# Patient Record
Sex: MALE | Race: WHITE | NOT HISPANIC OR LATINO | Employment: FULL TIME | URBAN - METROPOLITAN AREA
[De-identification: names, ages, dates, MRNs, and addresses within clinical notes are randomized per-mention and may not be internally consistent; named-entity substitution may affect disease eponyms.]

---

## 2017-01-18 ENCOUNTER — APPOINTMENT (OUTPATIENT)
Dept: CARDIOLOGY | Facility: CLINIC | Age: 47
End: 2017-01-18
Attending: HOSPITALIST
Payer: COMMERCIAL

## 2017-01-18 ENCOUNTER — APPOINTMENT (OUTPATIENT)
Dept: GENERAL RADIOLOGY | Facility: CLINIC | Age: 47
End: 2017-01-18
Attending: EMERGENCY MEDICINE
Payer: COMMERCIAL

## 2017-01-18 ENCOUNTER — HOSPITAL ENCOUNTER (OUTPATIENT)
Facility: CLINIC | Age: 47
Setting detail: OBSERVATION
Discharge: HOME | End: 2017-01-18
Attending: EMERGENCY MEDICINE | Admitting: HOSPITALIST
Payer: COMMERCIAL

## 2017-01-18 VITALS
DIASTOLIC BLOOD PRESSURE: 86 MMHG | BODY MASS INDEX: 29.17 KG/M2 | WEIGHT: 192.46 LBS | TEMPERATURE: 98.1 F | HEART RATE: 59 BPM | HEIGHT: 68 IN | SYSTOLIC BLOOD PRESSURE: 138 MMHG | OXYGEN SATURATION: 95 % | RESPIRATION RATE: 18 BRPM

## 2017-01-18 DIAGNOSIS — R07.9 CHEST PAIN: ICD-10-CM

## 2017-01-18 PROBLEM — R07.89 ATYPICAL CHEST PAIN: Status: ACTIVE | Noted: 2017-01-18

## 2017-01-18 LAB
ALBUMIN SERPL-MCNC: 3.7 G/DL (ref 3.4–5)
ALP SERPL-CCNC: 74 U/L (ref 40–150)
ALT SERPL W P-5'-P-CCNC: 30 U/L (ref 0–70)
ANION GAP SERPL CALCULATED.3IONS-SCNC: 9 MMOL/L (ref 3–14)
AST SERPL W P-5'-P-CCNC: 17 U/L (ref 0–45)
BASOPHILS # BLD AUTO: 0 10E9/L (ref 0–0.2)
BASOPHILS NFR BLD AUTO: 0.3 %
BILIRUB SERPL-MCNC: 0.5 MG/DL (ref 0.2–1.3)
BUN SERPL-MCNC: 14 MG/DL (ref 7–30)
CALCIUM SERPL-MCNC: 8.8 MG/DL (ref 8.5–10.1)
CHLORIDE SERPL-SCNC: 106 MMOL/L (ref 94–109)
CO2 SERPL-SCNC: 24 MMOL/L (ref 20–32)
CREAT SERPL-MCNC: 0.8 MG/DL (ref 0.66–1.25)
D DIMER PPP FEU-MCNC: NORMAL UG/ML FEU (ref 0–0.5)
DIFFERENTIAL METHOD BLD: NORMAL
EOSINOPHIL # BLD AUTO: 0.3 10E9/L (ref 0–0.7)
EOSINOPHIL NFR BLD AUTO: 2.4 %
ERYTHROCYTE [DISTWIDTH] IN BLOOD BY AUTOMATED COUNT: 13.4 % (ref 10–15)
GFR SERPL CREATININE-BSD FRML MDRD: ABNORMAL ML/MIN/1.7M2
GLUCOSE SERPL-MCNC: 103 MG/DL (ref 70–99)
HCT VFR BLD AUTO: 43.2 % (ref 40–53)
HGB BLD-MCNC: 15 G/DL (ref 13.3–17.7)
IMM GRANULOCYTES # BLD: 0 10E9/L (ref 0–0.4)
IMM GRANULOCYTES NFR BLD: 0.4 %
LYMPHOCYTES # BLD AUTO: 2.9 10E9/L (ref 0.8–5.3)
LYMPHOCYTES NFR BLD AUTO: 27.1 %
MCH RBC QN AUTO: 31.4 PG (ref 26.5–33)
MCHC RBC AUTO-ENTMCNC: 34.7 G/DL (ref 31.5–36.5)
MCV RBC AUTO: 91 FL (ref 78–100)
MONOCYTES # BLD AUTO: 0.7 10E9/L (ref 0–1.3)
MONOCYTES NFR BLD AUTO: 6.7 %
NEUTROPHILS # BLD AUTO: 6.9 10E9/L (ref 1.6–8.3)
NEUTROPHILS NFR BLD AUTO: 63.1 %
NRBC # BLD AUTO: 0 10*3/UL
NRBC BLD AUTO-RTO: 0 /100
PLATELET # BLD AUTO: 243 10E9/L (ref 150–450)
POTASSIUM SERPL-SCNC: 3.4 MMOL/L (ref 3.4–5.3)
PROT SERPL-MCNC: 7 G/DL (ref 6.8–8.8)
RBC # BLD AUTO: 4.77 10E12/L (ref 4.4–5.9)
SODIUM SERPL-SCNC: 139 MMOL/L (ref 133–144)
TROPONIN I SERPL-MCNC: NORMAL UG/L (ref 0–0.04)
WBC # BLD AUTO: 10.9 10E9/L (ref 4–11)

## 2017-01-18 PROCEDURE — 93350 STRESS TTE ONLY: CPT | Mod: 26 | Performed by: INTERNAL MEDICINE

## 2017-01-18 PROCEDURE — 85379 FIBRIN DEGRADATION QUANT: CPT | Performed by: INTERNAL MEDICINE

## 2017-01-18 PROCEDURE — 93016 CV STRESS TEST SUPVJ ONLY: CPT | Performed by: INTERNAL MEDICINE

## 2017-01-18 PROCEDURE — 93350 STRESS TTE ONLY: CPT | Mod: TC

## 2017-01-18 PROCEDURE — 93325 DOPPLER ECHO COLOR FLOW MAPG: CPT | Mod: 26 | Performed by: INTERNAL MEDICINE

## 2017-01-18 PROCEDURE — G0378 HOSPITAL OBSERVATION PER HR: HCPCS

## 2017-01-18 PROCEDURE — 93018 CV STRESS TEST I&R ONLY: CPT | Performed by: INTERNAL MEDICINE

## 2017-01-18 PROCEDURE — 93321 DOPPLER ECHO F-UP/LMTD STD: CPT | Mod: 26 | Performed by: INTERNAL MEDICINE

## 2017-01-18 PROCEDURE — 85025 COMPLETE CBC W/AUTO DIFF WBC: CPT | Performed by: EMERGENCY MEDICINE

## 2017-01-18 PROCEDURE — 71020 XR CHEST 2 VW: CPT

## 2017-01-18 PROCEDURE — 99236 HOSP IP/OBS SAME DATE HI 85: CPT | Performed by: HOSPITALIST

## 2017-01-18 PROCEDURE — 84484 ASSAY OF TROPONIN QUANT: CPT | Mod: 91 | Performed by: HOSPITALIST

## 2017-01-18 PROCEDURE — 36415 COLL VENOUS BLD VENIPUNCTURE: CPT | Performed by: INTERNAL MEDICINE

## 2017-01-18 PROCEDURE — 36415 COLL VENOUS BLD VENIPUNCTURE: CPT | Performed by: HOSPITALIST

## 2017-01-18 PROCEDURE — 84484 ASSAY OF TROPONIN QUANT: CPT | Performed by: EMERGENCY MEDICINE

## 2017-01-18 PROCEDURE — 93005 ELECTROCARDIOGRAM TRACING: CPT

## 2017-01-18 PROCEDURE — 80053 COMPREHEN METABOLIC PANEL: CPT | Performed by: EMERGENCY MEDICINE

## 2017-01-18 PROCEDURE — 25000132 ZZH RX MED GY IP 250 OP 250 PS 637: Performed by: EMERGENCY MEDICINE

## 2017-01-18 PROCEDURE — 99285 EMERGENCY DEPT VISIT HI MDM: CPT | Mod: 25

## 2017-01-18 RX ORDER — ACETAMINOPHEN 325 MG/1
650 TABLET ORAL EVERY 4 HOURS PRN
Status: DISCONTINUED | OUTPATIENT
Start: 2017-01-18 | End: 2017-01-18 | Stop reason: HOSPADM

## 2017-01-18 RX ORDER — NALOXONE HYDROCHLORIDE 0.4 MG/ML
.1-.4 INJECTION, SOLUTION INTRAMUSCULAR; INTRAVENOUS; SUBCUTANEOUS
Status: DISCONTINUED | OUTPATIENT
Start: 2017-01-18 | End: 2017-01-18 | Stop reason: HOSPADM

## 2017-01-18 RX ORDER — HYDRALAZINE HYDROCHLORIDE 20 MG/ML
10 INJECTION INTRAMUSCULAR; INTRAVENOUS EVERY 4 HOURS PRN
Status: DISCONTINUED | OUTPATIENT
Start: 2017-01-18 | End: 2017-01-18 | Stop reason: HOSPADM

## 2017-01-18 RX ORDER — ASPIRIN 81 MG/1
324 TABLET, CHEWABLE ORAL ONCE
Status: COMPLETED | OUTPATIENT
Start: 2017-01-18 | End: 2017-01-18

## 2017-01-18 RX ORDER — ASPIRIN 81 MG/1
81 TABLET ORAL DAILY
Status: DISCONTINUED | OUTPATIENT
Start: 2017-01-18 | End: 2017-01-18

## 2017-01-18 RX ORDER — ACETAMINOPHEN 650 MG/1
650 SUPPOSITORY RECTAL EVERY 4 HOURS PRN
Status: DISCONTINUED | OUTPATIENT
Start: 2017-01-18 | End: 2017-01-18 | Stop reason: HOSPADM

## 2017-01-18 RX ORDER — ALUMINA, MAGNESIA, AND SIMETHICONE 2400; 2400; 240 MG/30ML; MG/30ML; MG/30ML
15-30 SUSPENSION ORAL EVERY 4 HOURS PRN
Status: DISCONTINUED | OUTPATIENT
Start: 2017-01-18 | End: 2017-01-18 | Stop reason: HOSPADM

## 2017-01-18 RX ORDER — ASPIRIN 81 MG/1
162 TABLET, CHEWABLE ORAL ONCE
Status: DISCONTINUED | OUTPATIENT
Start: 2017-01-18 | End: 2017-01-18 | Stop reason: HOSPADM

## 2017-01-18 RX ORDER — LIDOCAINE 40 MG/G
CREAM TOPICAL
Status: DISCONTINUED | OUTPATIENT
Start: 2017-01-18 | End: 2017-01-18 | Stop reason: HOSPADM

## 2017-01-18 RX ORDER — ASPIRIN 81 MG/1
81 TABLET ORAL DAILY
Status: DISCONTINUED | OUTPATIENT
Start: 2017-01-19 | End: 2017-01-18 | Stop reason: HOSPADM

## 2017-01-18 RX ORDER — ATORVASTATIN CALCIUM 20 MG/1
20 TABLET, FILM COATED ORAL DAILY
Status: DISCONTINUED | OUTPATIENT
Start: 2017-01-18 | End: 2017-01-18 | Stop reason: HOSPADM

## 2017-01-18 RX ORDER — NITROGLYCERIN 0.4 MG/1
0.4 TABLET SUBLINGUAL EVERY 5 MIN PRN
Status: DISCONTINUED | OUTPATIENT
Start: 2017-01-18 | End: 2017-01-18

## 2017-01-18 RX ORDER — METOPROLOL SUCCINATE 50 MG/1
50 TABLET, EXTENDED RELEASE ORAL DAILY
Status: DISCONTINUED | OUTPATIENT
Start: 2017-01-18 | End: 2017-01-18 | Stop reason: HOSPADM

## 2017-01-18 RX ORDER — NITROGLYCERIN 0.4 MG/1
0.4 TABLET SUBLINGUAL EVERY 5 MIN PRN
Status: DISCONTINUED | OUTPATIENT
Start: 2017-01-18 | End: 2017-01-18 | Stop reason: HOSPADM

## 2017-01-18 RX ORDER — CLOPIDOGREL BISULFATE 75 MG/1
75 TABLET ORAL DAILY
Status: DISCONTINUED | OUTPATIENT
Start: 2017-01-18 | End: 2017-01-18 | Stop reason: HOSPADM

## 2017-01-18 RX ADMIN — ASPIRIN 81 MG 324 MG: 81 TABLET ORAL at 00:52

## 2017-01-18 ASSESSMENT — ENCOUNTER SYMPTOMS
SHORTNESS OF BREATH: 1
FEVER: 0
DIARRHEA: 0
NAUSEA: 0
ABDOMINAL PAIN: 0
PALPITATIONS: 1
COUGH: 0
VOMITING: 0

## 2017-01-18 NOTE — H&P
PRIMARY CARE PHYSICIAN:  Dr. Oscar Husain.      CHIEF COMPLAINT:  Chest pain.      HISTORY OF PRESENT ILLNESS:  Mr. Dustin Avila is a 46-year-old male with past medical history significant for hypertension, dyslipidemia, CAD with history of stents, history of Joshua-Parkinson-White syndrome, who presented to the ER today with complaints of chest pain.  He has a history of CAD, had drug-eluting stents to OM1, LAD and ramus in 06/2016.  He was supposed to have a stress echocardiogram after that, but he never got that.  He states he has been undergoing a lot of stress recently, has been going through a bad divorce.  He states for past 1 week he has been having chest pain intermittently helped by nitroglycerin.  He has also been having difficulty breathing at the same time.  He states for the past 2 days the sensation feels more like pressure now and no specific aggravating or alleviating factor.  He denies dyspnea on exertion.  In the ER he was seen by Dr. Singh.  He was given aspirin and he is currently chest pain-free.  He denies any fever, chills or rigors.  He denies pain in abdomen.  Reports a normal bowel and bladder habit.      REVIEW OF SYSTEMS:  A 10-point review of systems was done and was negative apart from those mentioned in the history of present illness.      PAST MEDICAL HISTORY:   1.  Hypertension.   2.  Dyslipidemia.   3.  Coronary artery disease, status post drug-eluting stent to OM1, LAD and ramus in 06/2016.   4.  History of Joshua-Parkinson-White syndrome.      MEDICATIONS PRIOR TO ADMISSION:  This  needs to be verified by the pharmacy, but he seems to be on Lipitor, Toprol-XL, aspirin, nitroglycerin, Plavix, magnesium, Senna, nutritional supplements, pyridoxine multivitamin.      SOCIAL HISTORY:  He reports smoking half pack per day.  Takes alcohol very rarely.  Denies illicit drug use.      FAMILY HISTORY:  He denies any family history of CAD.      ALLERGIES:  No known drug allergies.       PHYSICAL EXAMINATION:   GENERAL:  The patient is conscious, alert, oriented x3, lying comfortably in bed in no apparent distress.   VITAL SIGNS:  Temperature 97.4, heart rate of 74, blood pressure 137/85, saturation 95% on room air.   HEENT:  Pupils are equal and reactive to light and accommodation.  Extraocular movements are intact.  Oral mucosa is moist.   NECK:  Supple, no raised JVD.   LUNGS:  Lung sounds bilaterally clear to auscultation, no wheezes or crepitation.   CARDIOVASCULAR:  Normal S1-S2, regular rate and rhythm, no murmur.   ABDOMEN:  Soft, nontender, nondistended, no guarding, rigidity or rebound tenderness.   LOWER EXTREMITIES:  With no edema.   NEUROLOGIC:  No focal neurological deficits noted.  Cranial nerves II-XII grossly intact.   PSYCHIATRIC:  Normal mood and affect.      LABORATORY DATA AND IMAGING:  CBC, BMP reviewed in Epic are mostly unremarkable.  Troponin I negative x1.  Chest x-ray reviewed by me shows no acute infiltrates, effusion or pneumothorax.  EKG reviewed by me shows normal sinus rhythm, no acute ST-T wave changes.      ASSESSMENT AND PLAN:  Mr. Dustin Avila is a 46-year-old male with past medical history significant for hypertension, dyslipidemia, coronary artery disease, who presented to the emergency room today with complaints of chest pain.     1.  Chest pressure:  Sounds atypical, has been ongoing for 1 week without specific aggravating or relieving factor.  However, this is in the setting of significant coronary artery disease 6 months ago, so will admit him for observation.  We will monitor him on telemetry.  We will follow serial troponins and continue with aspirin.  Will resume his prior to admission Plavix, Lipitor, Toprol-XL when verified by pharmacy.  We will obtain a stress echocardiogram in a.m.     2.  Coronary artery disease with history of drug-eluting stent to OM1, LAD and ramus in 06/2016:  Will resume his prior to admission Lipitor, Toprol-XL, Plavix when  verified by pharmacy.  Plan as noted above.     3.  Hypertension:  Resume Toprol-XL when verified.  Will have hydralazine p.r.n.   4.  Dyslipidemia:  Resume Lipitor when verified.   5.  Deep venous thrombosis prophylaxis:  Anticipate a short stay, encourage ambulation.   6.  Code status:  Full code.         MT LOGAN MD             D: 2017 02:34   T: 2017 03:10   MT: [email or web address]      Name:     TAMMI WEBSTER   MRN:      [id number]        Account:      [id number]   :      1970           Admitted:     [id number]      Document: [id number]       cc: Oscar Husain MD

## 2017-01-18 NOTE — ED NOTES
Pt states he is feeling better, no chest discomfort like before.  Note sinus rhythm in 60's.  Pt did have a 5 beat run of v-tach at 0040

## 2017-01-18 NOTE — ED PROVIDER NOTES
"  History     Chief Complaint:  Chest Pain      HPI   Dustin Avila is a 46 year old male smoker who presents with chest pain.  Per chart review, he has a history significant for tobacco abuse, hypercholesterolemia and a non-ST segment elevation myocardial infarction in 06/2016.  At that time, he was taken to the Cardiac Catheterization Lab where Dr. Wiley placed drug-eluting stents in his left anterior descending artery, ramus intermedius branch and first obtuse marginal.  This left him with an occluded posterolateral branch that filled via left-to-right collaterals.  Ejection fraction in the hospital was 50%-55%.  Of note, he also has Joshua-Parkinson-White, first documented at [facility] in 2011, this appears to be asymptomatic.   The patient reports for the last week he has noticed increasing shortness of breath which he describes as \"labored breathing when I'm talking.\"  He states neither exertion nor positional changes have seemed to affect his shortness of breath.  The patient states over the past two days he has noticed intermittent chest pain that he describes as a tingling and pressure like sensation.  He states today he took a Nitro which improved his pain but tonight and chest pain which he describes as \"rolling pain.\"  He states his pain has been waxing and waning since onset this evening and lasts about 10 seconds when it comes on.  He reports he took Nitro at 11 PM which did not seem to improve his pain and decided to present to the ED.  He states the pain feels slightly similar to the pain he had when he required stent placement but also reports at times the pain feels somewhat similar to past heartburn, though he reports he has not had heartburn for a long time.   He states he has never been placed on a Holter monitor.  He denies recent fever, cough, nausea, vomiting, diarrhea, or abdominal pain.  He notes he his currently very stressed and anxious due to an ongoing divorce.  He denies " "recent travel.     Cardiac Risk Factors:  CAD:    +  Hypertension:   -  Hyperlipidemia:  +  Diabetes:   -  Tobacco use:   +  Gender:   M  Age:    46  Familial Hx of CAD:  -    Allergies:  NKDA      Medications:    Lipitor  Toprol-XL  Magnesium  Aspirin  Nitrostat  Plavix  Pyridoxine     Past Medical History:    CAD  NSTEMI  HLD  WPW  Arthritis  Achilles Tendonitis  Osgood-Schlatter/Osteochondroses  Lung Nodule  Ankle Edema  Generalized Anxiety     Past Surgical History:    Left achilles repair  Right hip replacement  Herniorrhaphy  Septoplasty  Knee Arthroscopy  Coronary Stent     Family History:  Father: Diabetes    Social History:  Relationship status:   The patient is a current every day smoker. 0.50 pack/day for 35 years  The patient denies alcohol use.   The patient presents alone.     Review of Systems   Constitutional: Negative for fever.   Respiratory: Positive for shortness of breath. Negative for cough.    Cardiovascular: Positive for chest pain and palpitations.   Gastrointestinal: Negative for nausea, vomiting, abdominal pain and diarrhea.   All other systems reviewed and are negative.      Physical Exam   First Vitals:  BP: 148/80 mmHg  Pulse: 74  Heart Rate: 68  Temp: 97.4  F (36.3  C)  Resp: 18  Height: 172.7 cm (5' 8\")  Weight: 91.1 kg (200 lb 13.4 oz)  SpO2: 98 %      Physical Exam    Physical Exam   Constitutional:  Patient is oriented to person, place, and time. They appear well-developed and well-nourished. Mild distress secondary to chest pain.    HENT:   Mouth/Throat:   Oropharynx is clear and moist.   Eyes:    Conjunctivae normal and EOM are normal. Pupils are equal, round, and reactive to light.   Neck:    Normal range of motion.   Cardiovascular: Normal rate, regular rhythm and normal heart sounds.  Exam reveals no gallop and no friction rub.  No murmur heard.  Pulmonary/Chest:  Effort normal and breath sounds normal. Patient has no wheezes. Patient has no rales.  No pleuritic chest " pain.   Abdominal:   Soft. Bowel sounds are normal. Patient exhibits no mass. There is no tenderness. There is no rebound and no guarding.   Musculoskeletal:  Normal range of motion. Patient exhibits no edema.   Neurological:   Patient is alert and oriented to person, place, and time. Patient has normal strength. No cranial nerve deficit or sensory deficit. GCS 15  Skin:   Skin is warm and dry. No rash noted. No erythema.   Psychiatric:   Patient has a normal mood and affect. Patient's behavior is normal. Judgment and thought content normal.     Emergency Department Course     ECG @ 0007  Indication: Chest Pain  Rate 70 bpm.   WV interval 126 ms.   QRS duration 92 ms.   QT/QTc 394/425 ms.   P-R-T axes 51.  Notes: Normal sinus rhythm.   Time read 0012    Imaging:  Radiographic findings were communicated with the patient who voiced understanding of the findings.    Chest XR per radiology:   IMPRESSION: No acute abnormality.    Laboratory:  CBC: WBC 10.9 (WNL) HGB 15.0 (WNL)  (WNL)   CMP: Cr 0.80 (WNL) Glucose 103 (H) Rest WNL  0033: Troponin I: <0.015 (WNL)     Interventions:  0052 Aspirin, 324 mg, PO    ED Course:  Nursing notes and past medical history reviewed.   I performed a physical examination of the patient as documented above.  I explained the plan with the patient who consents to this.   The patient underwent the workup as described above.      0143 Recheck and update.  The patient reports his pain is minimal and fleeting.  We discussed observation admission which he consents to.    Discussed the patient with Dr. Baires from the Hospitalist Service.    I personally reviewed the laboratory and imaging results with the Patient and answered all related questions prior to observation admission.     Findings and plan explained to the patient who consents to observation. Discussed the patient with Dr. Baires, who will place the patient in observation in the observation area.      Impression & Plan       Medical Decision Making:  Dustin Avila is a 46 year old male who presents to the emergency department today with a significant coronary artery history as recently as 6 months who presents with chest pain that has been off and on for the last two days.  Some incidence of the pain feels similar to when he received his stents, some do not.  Nitro earlier in the day was helping but he took a Nitro prior to arrival that did not help.  He also has felt short of breath that he describes as labored breathing when talking.  He has not had any fever or cough.  He has low risk factors for PE.  His pain was not concerning for dissection.  ECG shows no elevation of diffuse elevation that would be concerning for pericarditis.  In light of no fever and a normal troponin I believe myocarditis is unlikely.  Chest XR shows no evidence of pneumonia, CHF, pneumothorax, rib fractures, abnormal mediastinum, or other acute abnormality.  He is not acutely anemic.  There is no metabolic derangement.  He is not significantly hypertensive.  At the time that I saw him he is pain free and has remained so for the most part.  His episodes of pain now are just very brief, lasting seconds in duration.  At this time I do feel he needs to be admitted for further evaluation of his chest pain issues based on his significant history.  I will admit to Dr. Baires.     Diagnosis:    ICD-10-CM   1. Chest pain R07.9       Disposition:   The patient will be brought into the hospital for observation under the care of Dr. Baires.      Jeff SHEFFIELD, am serving as a scribe on 1/18/2017 at 12:36 AM to personally document services performed by iMya Singh MD, based on my observations and the provider's statements to me.          Miya Singh MD  01/18/17 0204

## 2017-01-18 NOTE — ED NOTES
"[facility]  ED Nurse Handoff Report    ED Chief complaint: Chest Pain      ED Diagnosis:   Final diagnoses:   Chest pain       Code Status: Full Code    Allergies: No Known Allergies    Activity level:  Independent     Needed?: No    Isolation: No  Infection: Not Applicable    Bariatric?: No      Vital Signs:   Filed Vitals:    01/18/17 0005 01/18/17 0031   BP: 148/80 130/90   Pulse: 74    Temp: 97.4  F (36.3  C)    TempSrc: Oral    Resp: 18 14   Height: 1.727 m (5' 8\")    Weight: 91.1 kg (200 lb 13.4 oz)    SpO2: 98% 96%       Cardiac Rhythm: ,        Pain level: 0-10 Pain Scale: 1    Is this patient confused?: No    Patient Report: Initial Complaint: chest pain  Focused Assessment: alert, orientated, monitor show sinus rhythm  Tests Performed: labs, chest  xray  Abnormal Results: see results  Treatments provided: monitor, asa    Family Comments: no    OBS brochure/video discussed/provided to patient: N/A    ED Medications:   Medications   aspirin chewable tablet 324 mg (324 mg Oral Given 1/18/17 0052)       Drips infusing?:  No      ED NURSE PHONE NUMBER: [phone]           "

## 2017-01-18 NOTE — PROGRESS NOTES
"\"What Is Outpatient Observation\" brochure was given & explained to the patient. Patient verbalized understanding but was upset that he was informed of this 8 hours after his admission. Explained to pt that Care Coordinators are not here over night. Offered an apology. Pt stated his insurance has been changed. A call was placed to Patient Registration for them to update his insurance information.   "

## 2017-01-18 NOTE — PLAN OF CARE
Problem: Discharge Planning  Goal: Discharge Planning (Adult, OB, Behavioral, Peds)  Outcome: Adequate for Discharge Date Met:  01/18/17  VSS, not in pain at time of discharge. Pt states all belongings and paperwork are accounted for.No new medications. No further questions on discharge information. Pt is driving self home, RN deems pt able as he is ambulatory, steady, and has no mind altering medications.

## 2017-01-18 NOTE — IP AVS SNAPSHOT
[facility]    [address] [place] [address]., [address]    [place] [address]    Phone:  [phone]                                       After Visit Summary   1/18/2017    Dustin Avila    MRN: [id number]           After Visit Summary Signature Page     I have received my discharge instructions, and my questions have been answered. I have discussed any challenges I see with this plan with the nurse or doctor.    ..........................................................................................................................................  Patient/Patient Representative Signature      ..........................................................................................................................................  Patient Representative Print Name and Relationship to Patient    ..................................................               ................................................  Date                                            Time    ..........................................................................................................................................  Reviewed by Signature/Title    ...................................................              ..............................................  Date                                                            Time

## 2017-01-18 NOTE — PLAN OF CARE
Problem: Goal Outcome Summary  Goal: Goal Outcome Summary  Outcome: Improving  Sinus rhythm w/o ectopy.  VSS. Cardiac painfree.  Stress echo negative but patient states he continues to feel SOB with activity.   Dr. White in room to assess and DDimer ordered.

## 2017-01-18 NOTE — PLAN OF CARE
"Problem: Goal Outcome Summary  Goal: Goal Outcome Summary  Outcome: No Change  Pt arrived from ED at 0245. VSS. Tele SR. Chest pain \"1/10,\" resting comfortably at present. Tolerated clear liquids. Troponin negative x2. Plan for exercise stress echo today.        "

## 2017-01-18 NOTE — ED NOTES
Pt states pain continues to be better, gets a few twinges.  States has a little tightness with deep breathing

## 2017-01-18 NOTE — ED NOTES
Pt has had chest pain on and off for the past week.  Mid sternal chest, Pt has had 3 stents placed in June 2016.

## 2017-01-18 NOTE — IP AVS SNAPSHOT
MRN:[id number]                      After Visit Summary   1/18/2017    Dustin Avila    MRN: [id number]           Thank you!     Thank you for choosing [facility] for your care. Our goal is always to provide you with excellent care. Hearing back from our patients is one way we can continue to improve our services. Please take a few minutes to complete the written survey that you may receive in the mail after you visit with us. Thank you!        Patient Information     Date Of Birth          1970        About your hospital stay     You were admitted on:  January 18, 2017 You last received care in the:  [facility]    You were discharged on:  January 18, 2017        Reason for your hospital stay       Admitted for chest pain, negative cardiac enzymes and stress echo.                  Who to Call     For medical emergencies, please call 911.  For non-urgent questions about your medical care, please call your primary care provider or clinic, [phone]          Attending Provider     Provider    Miya Singh MD Rauniyar, Olvin Enriquez MD       Primary Care Provider Office Phone # Fax #    Oscar Husain [phone]       [facility] [place] [address]  [place] [address]        Follow-up Appointments     Follow-up and recommended labs and tests        Follow up with primary care provider, Oscar Husain, within 7 days for hospital follow- up.  No follow up labs or test are needed.                  Pending Results     No orders found from 1/17/2017 to 1/19/2017.            Statement of Approval     Ordered          01/18/17 7165  I have reviewed and agree with all the recommendations and orders detailed in this document.   EFFECTIVE NOW     Approved and electronically signed by:  Marlene White MD             Admission Information        Department Dept Phone    1/18/2017  Card[facility] [phone]      Your  "Vitals Were     Blood Pressure Pulse Temperature    138/86 mmHg 59 98.1  F (36.7  C) (Oral)    Respirations Height Weight    18 1.727 m (5' 8\") 87.3 kg (192 lb 7.4 oz)    BMI (Body Mass Index) Pulse Oximetry       29.27 kg/m2 95%       MyChart Information     [employer] gives you secure access to your electronic health record. If you see a primary care provider, you can also send messages to your care team and make appointments. If you have questions, please call your primary care clinic.  If you do not have a primary care provider, please call [phone] and they will assist you.        Care EveryWhere ID     This is your Care EveryWhere ID. This could be used by other organizations to access your [facility] medical records  [id number]           Review of your medicines      CONTINUE these medicines which have NOT CHANGED        Dose / Directions    aspirin 81 MG EC tablet   Used for:  NSTEMI (non-ST elevated myocardial infarction) (H)        Dose:  81 mg   Take 1 tablet (81 mg) by mouth daily   Quantity:  90 tablet   Refills:  3       atorvastatin 20 MG tablet   Commonly known as:  LIPITOR   Used for:  Hyperlipidemia LDL goal <70        TAKE 1 TABLET(20 MG) BY MOUTH DAILY   Quantity:  30 tablet   Refills:  3       clopidogrel 75 MG tablet   Commonly known as:  PLAVIX   Used for:  NSTEMI (non-ST elevated myocardial infarction) (H)        Dose:  75 mg   Take 1 tablet (75 mg) by mouth daily   Quantity:  90 tablet   Refills:  3       metoprolol 50 MG 24 hr tablet   Commonly known as:  TOPROL-XL   Used for:  NSTEMI (non-ST elevated myocardial infarction) (H)        TAKE 1 TABLET(50 MG) BY MOUTH DAILY   Quantity:  90 tablet   Refills:  3       nitroglycerin 0.4 MG sublingual tablet   Commonly known as:  NITROSTAT   Used for:  NSTEMI (non-ST elevated myocardial infarction) (H)   Notes to Patient:  Always sit prior to taking, this medicine will lower your blood pressure        Dose:  0.4 mg   Place 1 tablet (0.4 mg) under " the tongue every 5 minutes as needed for chest pain if you are still having symptoms after 3 doses (15 minutes) call 911.   Quantity:  25 tablet   Refills:  11                Protect others around you: Learn how to safely use, store and throw away your medicines at www.disposemymeds.org.             Medication List: This is a list of all your medications and when to take them. Check marks below indicate your daily home schedule. Keep this list as a reference.      Medications           Morning Afternoon Evening Bedtime As Needed    aspirin 81 MG EC tablet   Take 1 tablet (81 mg) by mouth daily   Next Dose Due:  Tomorrow 1/19/17 morning                                   atorvastatin 20 MG tablet   Commonly known as:  LIPITOR   TAKE 1 TABLET(20 MG) BY MOUTH DAILY   Next Dose Due:  Tomorrow 1/19/17 morning                                   clopidogrel 75 MG tablet   Commonly known as:  PLAVIX   Take 1 tablet (75 mg) by mouth daily   Next Dose Due:  Tomorrow 1/19/17 morning                                   metoprolol 50 MG 24 hr tablet   Commonly known as:  TOPROL-XL   TAKE 1 TABLET(50 MG) BY MOUTH DAILY   Next Dose Due:  Tomorrow 1/19/17 morning                                   nitroglycerin 0.4 MG sublingual tablet   Commonly known as:  NITROSTAT   Place 1 tablet (0.4 mg) under the tongue every 5 minutes as needed for chest pain if you are still having symptoms after 3 doses (15 minutes) call 911.   Notes to Patient:  Always sit prior to taking, this medicine will lower your blood pressure

## 2017-01-19 ENCOUNTER — TELEPHONE (OUTPATIENT)
Dept: FAMILY MEDICINE | Facility: CLINIC | Age: 47
End: 2017-01-19

## 2017-01-19 NOTE — TELEPHONE ENCOUNTER
"Hospital/TCU/ED for chronic condition Discharge Protocol    \"Hi, my name is Shraddha Weber, a registered nurse, and I am calling from [facility].  I am calling to follow up and see how things are going for you after your recent emergency visit/hospital/TCU stay.\"    Tell me how you are doing now that you are home?\" still feeling SOB- no chest pain      Discharge Instructions    \"Let's review your discharge instructions.  What is/are the follow-up recommendations?  Pt. Response: havent    \"Has an appointment with your primary care provider been scheduled?\"   Yes. (confirm)    \"When you see the provider, I would recommend that you bring your medications with you.\"    Medications    \"Tell me what changed about your medicines when you discharged?\"    Changes to chronic meds?    0-1    \"What questions do you have about your medications?\"    None     New diagnoses of heart failure, COPD, diabetes, or MI?    No              Medication reconciliation completed? Yes  Was MTM referral placed (*Make sure to put transitions as reason for referral)?   No    Call Summary    \"What questions or concerns do you have about your recent visit and your follow-up care?\"     none    \"If you have questions or things don't continue to improve, we encourage you contact us through the main clinic number (give number).  Even if the clinic is not open, triage nurses are available 24/7 to help you.     We would like you to know that our clinic has extended hours (provide information).  We also have urgent care (provide details on closest location and hours/contact info)\"      \"Thank you for your time and take care!\"             "

## 2017-01-19 NOTE — PROGRESS NOTES
Pt. R/O'd and had negative stress echo.   Pt. Had a negative DDimer.  Dr. White discharged patient.   Paperwork reviewed and patient states he understands.   Discharge to Door [address]- pt. Had his car in the east ramp.   Pt. Drove home

## 2017-01-19 NOTE — DISCHARGE SUMMARY
PRIMARY CARE PHYSICIAN:  Oscar Husain MD      DATE OF ADMISSION:  2017      DATE OF DISCHARGE:  2017      DISCHARGE DIAGNOSES:   1.  Chest pain, noncardiac.   2.  History of coronary artery disease.   3.  History of hypertension.   4.  History of hyperlipidemia.   5.  History of Joshua-Parkinson-White syndrome.      ALLERGIES:  No known drug allergies.      DISCHARGE MEDICATIONS:  The same as on admission, these include the followin.  Aspirin 81 mg enteric-coated p.o. daily.   2.  Atorvastatin 20 mg p.o. daily.   3.  Plavix 75 mg p.o. daily.   4.  Metoprolol XL 50 mg p.o. daily.   5.  Nitroglycerin 0.4 mg sublingually q.5 minutes p.r.n. chest pain.  After 3 doses if still with chest pain call 911.  The patient is going to follow up with primary care provider within 7-10 days for hospital followup.      PENDING TEST RESULTS:  None.      PERTINENT LABORATORY DATA AND IMAGING STUDIES:  Troponin less than 0.015 x3.      LABORATORY DATA:  Sodium 139, potassium 3.4, chloride 106, bicarbonate 24, BUN 14, creatinine 0.80.  LFTs within normal limits.  Glucose 103, white count 10.9, hemoglobin 15.0, platelet count 243,000.      Chest x-ray showed no acute abnormality.  The heart size was normal.  Lungs were clear.  No pneumothorax or pleural effusion.  The patient had a stress echocardiogram 2017.  This showed the following:  The patient exercised 12 minutes 45 seconds.  He exhibited no chest pain during exercise EKG portion of the stress test was negative for inducible ischemia, normal resting wall motion and no stress-induced wall motion abnormalities.  Also, the patient had a D-dimer that was normal at less than 0.3.      CONSULTATIONS DURING THIS HOSPITALIZATION:  None.      HOSPITAL COURSE:  Please see dictated history and physical for patient's initial presentation.  Dustin Avila is a 46-year-old male with past medical history of hypertension, dyslipidemia, coronary artery with a history of stents,  history of Joshua-Parkinson-Clifford who presented to the emergency room on 2017 with complaint of chest pain.  He has a history of coronary artery disease and had drug-eluting stents to the OM1, LAD and ramus  in 2016  He was supposed to have a stress echocardiogram after that but he never got it.  The patient has been underlying a lot of stress recently is going to a bad divorce.  He stated for the last week he had been having chest pain intermittently helped by nitroglycerin.  He also felt that he was having difficulty breathing at the same time.  He felt for the past 2 days sensation felt more like pressure with no specific aggravating or alleviating factors.  The patient was chest pain-free when he was admitted.  The patient had negative cardiac enzymes x3.  He then had a normal stress echocardiogram.  The patient then wanted an explanation for why he had had occasional tingling in his chest and had to take a big breath when he did not take a deep breath he felt dizzy.  A D-dimer was added to his labs and this returned negative at less than 0.3, therefore unlikely that the patient has a pulmonary embolus.  The patient probably has some anxiety component given his stress level due to his bad divorce that he is currently going through.  The patient also was very anxious during this hospitalization.  The patient will follow up with his primary care provider as dictated above.         DOMO BARRAGAN MD             D: 2017 17:14   T: 2017 22:02   MT: GINA      Name:     TAMMI WEBSTER   MRN:      [id number]        Account:        [id number]   :      1970           Admit Date:                                       Discharge Date: 2017      Document: [id number]       cc: Oscar Husain MD

## 2017-01-19 NOTE — TELEPHONE ENCOUNTER
Pt was discharged from [facility] on 1/18/2017 after being treated for chest pain. Please call the pt. Thank you.  Anita Dean,

## 2017-02-12 ENCOUNTER — TRANSFERRED RECORDS (OUTPATIENT)
Dept: HEALTH INFORMATION MANAGEMENT | Facility: CLINIC | Age: 47
End: 2017-02-12

## 2017-03-10 ENCOUNTER — OFFICE VISIT (OUTPATIENT)
Dept: PSYCHOLOGY | Facility: CLINIC | Age: 47
End: 2017-03-10

## 2017-03-10 DIAGNOSIS — F41.9 ANXIETY: Primary | ICD-10-CM

## 2017-03-10 PROCEDURE — 90791 PSYCH DIAGNOSTIC EVALUATION: CPT | Performed by: SOCIAL WORKER

## 2017-03-10 NOTE — MR AVS SNAPSHOT
MRN:[id number]                      After Visit Summary   3/10/2017    Dustin Avila    MRN: [id number]           Visit Information        Provider Department      3/10/2017 1:30 PM Cecilia Bansal LICSW [facility] Generic      Your next 10 appointments already scheduled     Mar 17, 2017  1:30 PM CDT   Return Visit with DAHLIA Paredes   [facility] ([facility])    [address]  [address]  [facility] [id number] [phone]              MyChart Information     [employer] gives you secure access to your electronic health record. If you see a primary care provider, you can also send messages to your care team and make appointments. If you have questions, please call your primary care clinic.  If you do not have a primary care provider, please call [phone] and they will assist you.        Care EveryWhere ID     This is your Care EveryWhere ID. This could be used by other organizations to access your [place] medical records  [id number]

## 2017-03-13 PROBLEM — F41.9 ANXIETY: Status: ACTIVE | Noted: 2017-03-13

## 2017-03-13 NOTE — PROGRESS NOTES
Progress Note - Initial Session    Client Name:  Dustin Avila Date: 3/10/17         Service Type: Individual      Session Start Time: 130  Session End Time: 215      Session Length: 38 - 52      Session #: 1     Attendees: Client attended alone         Diagnostic Assessment in progress.  Unable to complete documentation at the conclusion of the first session due to time limits.      Mental Status Assessment:  Appearance:   Appropriate  somewhat dishelveld.   Eye Contact:   Good   Psychomotor Behavior: Normal  Restless   Attitude:   Cooperative   Orientation:   All  Speech   Rate / Production: Hyperverbal    Volume:  Loud   Mood:    Angry  Anxious  Depressed   Affect:    Appropriate   Thought Content:  Clear  Perservative  Rumination   Thought Form:  Coherent  Logical   Insight:    Fair       Safety Issues and Plan for Safety and Risk Management:  Client denies current fears or concerns for personal safety.  Client denies current or recent suicidal ideation or behaviors.  Client denies current or recent homicidal ideation or behaviors.  Client denies current or recent self injurious behavior or ideation.  Client denies other safety concerns.  A safety and risk management plan has not been developed at this time, however client was given the after-hours number / 911 should there be a change in any of these risk factors.  Client reports there are no firearms in the house.      Diagnostic Criteria:  Mixed anxiety-depressive disorder: clinically significant symptoms of anxiety and depression, but the criteria are not met for either a specific Mood Disorder or a specific Anxiety Disorder.   - Excessive anxiety and worry about a number of events or activities (such as work or school performance).    - The person finds it difficult to control the worry.   - Restlessness or feeling keyed up or on edge.    - The anxiety, worry, or physical symptoms cause clinically significant distress or impairment in  social, occupational, or other important areas of functioning.    - The disturbance is not due to the direct physiological effects of a substance (e.g., a drug of abuse, a medication) or a general medical condition (e.g., hyperthyroidism) and does not occur exclusively during a Mood Disorder, a Psychotic Disorder, or a Pervasive Developmental Disorder.        DSM5 Diagnoses: (Sustained by DSM5 Criteria Listed Above)  Diagnoses: 300.00 (F41.9) Unspecified Anxiety Disorder  Psychosocial & Contextual Factors: Going through a divorce. Limited support.  WHODAS 2.0 (12 item)            This questionnaire asks about difficulties due to health conditions. Health conditions  include  disease or illnesses, other health problems that may be short or long lasting,  injuries, mental health or emotional problems, and problems with alcohol or drugs.                     Think back over the past 30 days and answer these questions, thinking about how much  difficulty you had doing the following activities. For each question, please [place] only  one response.    S1 Standing for long periods such as 30 minutes? Moderate =   3   S2 Taking care of household responsibilities? Mild =           2   S3 Learning a new task, for example, learning how to get to a new place? None =         1   S4 How much of a problem do you have joining community activities (for example, festivals, Buddhism or other activities) in the same way as anyone else can? None =         1   S5 How much have you been emotionally affected by your health problems? None =         1     In the past 30 days, how much difficulty did you have in:   S6 Concentrating on doing something for ten minutes? None =         1   S7 Walking a long distance such as a kilometer (or equivalent)? Mild =           2   S8 Washing your whole body? None =         1   S9 Getting dressed? None =         1   S10 Dealing with people you do not know? None =         1   S11 Maintaining a friendship?  None =         1   S12 Your day to day work? None =         1     H1 Overall, in the past 30 days, how many days were these difficulties present? Record number of days 0   H2 In the past 30 days, for how many days were you totally unable to carry out your usual activities or work because of any health condition? Record number of days  0   H3 In the past 30 days, not counting the days that you were totally unable, for how many days did you cut back or reduce your usual activities or work because of any health condition? Record number of days 0       Collateral Reports Completed:  Not Applicable      PLAN: (Homework, other):  Client stated that he may follow up for ongoing services with [facility].  R/S for future appointments. He is transferring care to [facility] due to insurance changes. States he has signed an CHARISSE with previous therapist through [employer], Ana Laura Bingham. His therapy has been court ordered.      Cecilia Bansal, FLORINSW

## 2017-03-17 ENCOUNTER — OFFICE VISIT (OUTPATIENT)
Dept: PSYCHOLOGY | Facility: CLINIC | Age: 47
End: 2017-03-17

## 2017-03-17 DIAGNOSIS — F41.9 ANXIETY: Primary | ICD-10-CM

## 2017-03-17 PROCEDURE — 90834 PSYTX W PT 45 MINUTES: CPT | Performed by: SOCIAL WORKER

## 2017-03-17 ASSESSMENT — ANXIETY QUESTIONNAIRES
5. BEING SO RESTLESS THAT IT IS HARD TO SIT STILL: NOT AT ALL
7. FEELING AFRAID AS IF SOMETHING AWFUL MIGHT HAPPEN: NOT AT ALL
3. WORRYING TOO MUCH ABOUT DIFFERENT THINGS: NOT AT ALL
6. BECOMING EASILY ANNOYED OR IRRITABLE: SEVERAL DAYS
GAD7 TOTAL SCORE: 3
2. NOT BEING ABLE TO STOP OR CONTROL WORRYING: SEVERAL DAYS
1. FEELING NERVOUS, ANXIOUS, OR ON EDGE: SEVERAL DAYS
IF YOU CHECKED OFF ANY PROBLEMS ON THIS QUESTIONNAIRE, HOW DIFFICULT HAVE THESE PROBLEMS MADE IT FOR YOU TO DO YOUR WORK, TAKE CARE OF THINGS AT HOME, OR GET ALONG WITH OTHER PEOPLE: NOT DIFFICULT AT ALL

## 2017-03-17 ASSESSMENT — PATIENT HEALTH QUESTIONNAIRE - PHQ9: 5. POOR APPETITE OR OVEREATING: NOT AT ALL

## 2017-03-17 NOTE — MR AVS SNAPSHOT
MRN:[id number]                      After Visit Summary   3/17/2017    Dustin Avila    MRN: [id number]           Visit Information        Provider Department      3/17/2017 1:30 PM Cecilia Bansal, [facility] Generic      Your next 10 appointments already scheduled     Apr 06, 2017  1:00 PM CDT   Return Visit with Cecilia Nimisha [facility] ([facility])    [address]  [place] [address] [phone]            Apr 27, 2017  1:00 PM CDT   Return Visit with Cecilia Lewisjosé [facility] ([facility] [place])    [address]  [place] [address] [phone]              MyChart Information     [employer] gives you secure access to your electronic health record. If you see a primary care provider, you can also send messages to your care team and make appointments. If you have questions, please call your primary care clinic.  If you do not have a primary care provider, please call [phone] and they will assist you.        Care EveryWhere ID     This is your Care EveryWhere ID. This could be used by other organizations to access your [place] medical records  [id number]

## 2017-03-17 NOTE — PROGRESS NOTES
Progress Note    Client Name: Dustin Avila  Date: 3/17/2017           Service Type: Individual      Session Start Time: 130  Session End Time: 215      Session Length: 45     Session #: 2     Attendees: Client attended alone    Treatment Plan Last Reviewed: in progress  PHQ-9 / JOHN-7 : 1;3     DATA      Progress Since Last Session (Related to Symptoms / Goals / Homework):   Symptoms: Stable    Homework: Completed in session      Episode of Care Goals: Minimal progress - PREPARATION (Decided to change - considering how); Intervened by negotiating a change plan and determining options / strategies for behavior change, identifying triggers, exploring social supports, and working towards setting a date to begin behavior change     Current / Ongoing Stressors and Concerns:   Going through a divorce with children. Hx of anxiety and anger issues. Court ordered.      Treatment Objective(s) Addressed in This Session:   Discussing areas for tx gaol planning. Wants to be better at coping with anger and anxiety. Easily triggered by being acused of things he did not do and when he feels out of control.  Hx of using MJ medicinally but states he hasnt smoked in 1.5 weeks and is pursuing chiropractor and massage instead.      Intervention:   CBT.He has read Feeling Good by Cook. Introduced Awareness Wheel tool. and gave HW related to tracking thinking and meaning he puts to feelings/situations.         ASSESSMENT: Current Emotional / Mental Status (status of significant symptoms):   Risk status (Self / Other harm or suicidal ideation)   Client denies current fears or concerns for personal safety.   Client denies current or recent suicidal ideation or behaviors.   Client denies current or recent homicidal ideation or behaviors.   Client denies current or recent self injurious behavior or ideation.   Client denies other safety concerns.   A safety and risk management plan has not been  developed at this time, however client was given the after-hours number / 911 should there be a change in any of these risk factors.     Appearance:   Appropriate    Eye Contact:   Good    Psychomotor Behavior: Normal    Attitude:   Cooperative    Orientation:   All   Speech    Rate / Production: Hyperverbal  Normal     Volume:  Normal    Mood:    Angry  Anxious  Irritable    Affect:    Appropriate    Thought Content:  Clear  Rumination    Thought Form:  Coherent    Insight:    Fair      Medication Review:   No current psychiatric medications prescribed     Medication Compliance:   NA     Changes in Health Issues:   None reported     Chemical Use Review:   Substance Use: Chemical use reviewed, no active concerns identified      Tobacco Use: No current tobacco use.       Collateral Reports Completed:   Not Applicable    PLAN: (Client Tasks / Therapist Tasks / Other)  Returns to clinic in 2 weeks. Complete tx plan and f/u on HW.        DAHLIA Paredes                                                         ________________________________________________________________________
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
## 2017-03-18 ENCOUNTER — HOSPITAL ENCOUNTER (EMERGENCY)
Facility: CLINIC | Age: 47
Discharge: HOME | End: 2017-03-18
Attending: EMERGENCY MEDICINE | Admitting: EMERGENCY MEDICINE
Payer: COMMERCIAL

## 2017-03-18 VITALS
BODY MASS INDEX: 28.04 KG/M2 | TEMPERATURE: 98 F | HEIGHT: 68 IN | DIASTOLIC BLOOD PRESSURE: 81 MMHG | OXYGEN SATURATION: 97 % | SYSTOLIC BLOOD PRESSURE: 130 MMHG | WEIGHT: 185 LBS | HEART RATE: 92 BPM | RESPIRATION RATE: 18 BRPM

## 2017-03-18 DIAGNOSIS — Z79.02 ANTIPLATELET OR ANTITHROMBOTIC LONG-TERM USE: ICD-10-CM

## 2017-03-18 DIAGNOSIS — S46.212A BICEPS RUPTURE, DISTAL, LEFT, INITIAL ENCOUNTER: ICD-10-CM

## 2017-03-18 PROCEDURE — 99282 EMERGENCY DEPT VISIT SF MDM: CPT

## 2017-03-18 RX ORDER — OXYCODONE HYDROCHLORIDE 5 MG/1
10 TABLET ORAL EVERY 6 HOURS PRN
Qty: 20 TABLET | Refills: 0 | Status: SHIPPED | OUTPATIENT
Start: 2017-03-18 | End: 2017-07-18

## 2017-03-18 ASSESSMENT — PATIENT HEALTH QUESTIONNAIRE - PHQ9: SUM OF ALL RESPONSES TO PHQ QUESTIONS 1-9: 1

## 2017-03-18 ASSESSMENT — ANXIETY QUESTIONNAIRES: GAD7 TOTAL SCORE: 3

## 2017-03-18 ASSESSMENT — ENCOUNTER SYMPTOMS: WOUND: 0

## 2017-03-18 NOTE — ED PROVIDER NOTES
"  History     Chief Complaint:  Arm Pain       HPI   Dustin Avila is a 46 year old male with a history of arthritis, CAD, hyperlipidemia, and WPW anticoagulated on Plavix who presents to the emergency department today for evaluation of arm pain. The patient reports last night while at work he was leaning over a counter, when the lid of an ice tray slid backwards, causing him to abruptly brace himself with partially flexed elbows.  He heard a pop from his L upper arm and had immediate pain.  He is R handed.  His pain worsening this morning which prompts his visit to the ED for further evaluation. He describes it as a pulsating sensation and rates it a 4/10 while at rest but worse to a 7 or 8 with movement. He has not taken any analgesics since the onset stating \"I don't take pain pills, I just deal with it.\" The patient did not sustain any other injuries and has no other concerns at this time. He is worried that he has torn a muscle or tendon, and does not think the bones are injured.    Allergies:  No Known Drug Allergies     Medications:    Lipitor  Metoprolol  Aspirin  Nitroglycerin  Plavix    Past Medical History:    Achilles tendonitis  Ankle edema  Arthritis  CAD  Hyperlipidemia  Incidental lung nodule  Osgood-Schlatter/osteochondroses  WPW    Past Surgical History:    Orthopedic - left leg achilles rupture and right hip replacement  Hernia repair  Septoplasty  Arthroscopy knee     Family History:    Diabetes     Social History:  The patient was accompanied to the ED by himself.  Smoking Status: Current - 0.50 packs/day for 35 years  Smokeless Tobacco: Never  Alcohol Use: Rarely  Marital Status:        Review of Systems   Musculoskeletal:        Left arm pain   Skin: Negative for wound.   All other systems reviewed and are negative.    Physical Exam   First Vitals:  BP: 130/81  Pulse: 92  Temp: 98  F (36.7  C)  Resp: 18  Height: 172.7 cm (5' 8\")  Weight: 83.9 kg (185 lb)  SpO2: 97 %      Physical " Exam  General: male sitting upright  HENT: MMM, no signs of facial trauma  CV:  regular rhythm, soft compartments in LUE, cap refill normal in L fingers, normal L radial pulse  Resp: normal effort  GI: abdomen soft, nontender  MSK:  Spine: no midline tenderness  Extremities: abnormal bulge to soft tissues of anterior distal L upper arm with small indentation just superior to this.  Can flex and extend his elbow to 90 degrees but doing so increases pain.  No pain with axial loading of joints.  No palpable bony deformity.  L shoulder and forearm nontender.  Skin:   No abrasion  Mild-moderate patchy ecchymosis to L anterior distal upper arm  No laceration  Neuro: awake, alert, GCS 15, responds appropriately to commands, SILT all extremities  Psych: cooperative      Emergency Department Course     Emergency Department Course:  Nursing notes and vitals reviewed.  0710: I performed an exam of the patient as documented above.   0727: Discussed the patient with Dr. Stevenson, of the orthopedic service. They agree with the plan and will evaluate the patient as an outpatient.  He recommends treatment with a sling, and close outpatient follow up.    I discussed the treatment plan with the patient. They expressed understanding of this plan and consented to discharge.   They will be discharged home with instructions for care and follow up. In addition, the patient will return to the emergency department if their symptoms persist, worsen, if new symptoms arise or if there is any concern.  All questions were answered.    Impression & Plan      Medical Decision Making:  This 46 year old presents with signs and symptoms consistent for bicep rupture or perhaps tendon injury. Patient himself does not suspect bony injury and his exam is not suggestive of this. We are in agreement to defer any imaging. I am grateful for the input of Dr. Stevenson with orthopedics who suspects a biceps rupture and who recommended placed in a sling and  contacting his care coordinator Alex at [phone] to arrange close follow up early in the coming week. It is likely the patient will need surgical intervention though this is not emergent. Particularly given his antiplatelet use, compartment syndrome was considered though on exam his compartments are unmistakably soft at this time. I do not think that labs will be of help. The patient was given a short course of oral opiates and confirmed his understanding satisfaction of this plan. He was offered a splint for greater immobilization for comfort though he politely declined this.     Diagnosis:    ICD-10-CM    1. Biceps rupture, distal, left, initial encounter S46.212A     suspected   2. Antiplatelet or antithrombotic long-term use Z79.02      Disposition:   Discharged to home with the below prescription     Discharge Medications:  Discharge Medication List as of 3/18/2017  7:40 AM      START taking these medications    Details   oxyCODONE (ROXICODONE) 5 MG IR tablet Take 2 tablets (10 mg) by mouth every 6 hours as needed for moderate to severe pain, Disp-20 tablet, R-0, Local Print             Scribe Disclosure:  I, Lori Holliday, am serving as a scribe at 7:07 AM on 3/18/2017 to document services personally performed by Bobby Strong MD, based on my observations and the provider's statements to me.    3/18/2017    EMERGENCY DEPARTMENT       Bobby Strong MD  03/18/17 0985

## 2017-03-18 NOTE — ED AVS SNAPSHOT
Emergency Department    [address] [facility] [address]    Phone:  [phone]    Fax:  [phone]                                       Dustin Avila   MRN: [id number]    Department:   Emergency Department   Date of Visit:  3/18/2017           Patient Information     Date Of Birth          1970        Your diagnoses for this visit were:     Biceps rupture, distal, left, initial encounter suspected    Antiplatelet or antithrombotic long-term use        You were seen by Bobby Strong MD.      Follow-up Information     Follow up with Jose Daniel Stevenson MD. Schedule an appointment as soon as possible for a visit in 2 days.    Specialty:  Surgery    Why:  Call Dr. Stevenson's Care Coordinator Alex on Monday morning at [phone]    Contact information:    [facility]    [address] [facility] [address] [phone]          Follow up with  Emergency Department.    Specialty:  EMERGENCY MEDICINE    Why:  As needed, If symptoms worsen    Contact information:    [address] [facility] [address] [phone]        Discharge Instructions       It is suspected that you have ruptured your L biceps muscle.  It is important that you follow up soon with Dr. Stevenson, an Orthopedist, by calling his Care Coordinator Alex on Monday morning at [phone] to make an appointment for recheck early next week in clinic.  In the meantime, do not do any lifting with your L arm/hand, and use the pain medication as needed.        Opioid Medication Information    You have been given a prescription for an opioid (narcotic) pain medicine and/or have received a pain medicine while here in the Emergency Department. These medicines can make you drowsy or impaired. You must not drive, operate dangerous equipment, or engage in any other dangerous activities while taking these medications. If you drive while taking these medications, you could be  arrested for DUI, or driving under the influence. Do not drink any alcohol while you are taking these medications.   Opioid pain medications can cause addiction. If you have a history of chemical dependency of any type, you are at a higher risk of becoming addicted to pain medications.  Only take these prescribed medications to treat your pain when all other options have been tried. Take it for as short a time and as few doses as possible. Store your pain pills in a secure place, as they are frequently stolen and provide a dangerous opportunity for children or visitors in your house to start abusing these powerful medications. We will not replace any lost or stolen medicine.  As soon as your pain is better, you should flush all your remaining medication.   Many prescription pain medications contain Tylenol  (acetaminophen), including Vicodin , Tylenol #3 , Norco , Lortab , and Percocet .  You should not take any extra pills of Tylenol  if you are using these prescription medications or you can get very sick.  Do not ever take more than 4000 mg of acetaminophen in any 24 hour period.  All opioids tend to cause constipation. Drink plenty of water and eat foods that have a lot of fiber, such as fruits, vegetables, prune juice, apple juice and high fiber cereal.  Take a laxative if you don t move your bowels at least every other day. Miralax , Milk of Magnesia, Colace , or Senna  can be used to keep you regular.            Future Appointments        Provider Department Dept Phone Center    4/6/2017 1:00 PM Cecilia Bansal [facility] [phone] [facility]    4/27/2017 1:00 PM Cecilia Bansal [facility] [phone] [facility]      24 Hour Appointment Hotline       To make an appointment at any [facility], call [phone] ([phone]). If you don't have a family doctor or clinic, we will help you find one. [facility] are conveniently located  to serve the needs of you and your family.             Review of your medicines      START taking        Dose / Directions Last dose taken    oxyCODONE 5 MG IR tablet   Commonly known as:  ROXICODONE   Dose:  10 mg   Quantity:  20 tablet        Take 2 tablets (10 mg) by mouth every 6 hours as needed for moderate to severe pain   Refills:  0          Our records show that you are taking the medicines listed below. If these are incorrect, please call your family doctor or clinic.        Dose / Directions Last dose taken    aspirin 81 MG EC tablet   Dose:  81 mg   Quantity:  90 tablet        Take 1 tablet (81 mg) by mouth daily   Refills:  3        atorvastatin 20 MG tablet   Commonly known as:  LIPITOR   Quantity:  30 tablet        TAKE 1 TABLET(20 MG) BY MOUTH DAILY   Refills:  3        clopidogrel 75 MG tablet   Commonly known as:  PLAVIX   Dose:  75 mg   Quantity:  90 tablet        Take 1 tablet (75 mg) by mouth daily   Refills:  3        metoprolol 50 MG 24 hr tablet   Commonly known as:  TOPROL-XL   Quantity:  90 tablet        TAKE 1 TABLET(50 MG) BY MOUTH DAILY   Refills:  3        nitroglycerin 0.4 MG sublingual tablet   Commonly known as:  NITROSTAT   Dose:  0.4 mg   Quantity:  25 tablet        Place 1 tablet (0.4 mg) under the tongue every 5 minutes as needed for chest pain if you are still having symptoms after 3 doses (15 minutes) call 911.   Refills:  11                Prescriptions were sent or printed at these locations (1 Prescription)                   Other Prescriptions                Printed at Department/Unit printer (1 of 1)         oxyCODONE (ROXICODONE) 5 MG IR tablet                Orders Needing Specimen Collection     None      Pending Results     No orders found from 3/16/2017 to 3/19/2017.            Pending Culture Results     No orders found from 3/16/2017 to 3/19/2017.             Test Results from your hospital stay            Clinical Quality Measure: Blood Pressure Screening     Your  blood pressure was checked while you were in the emergency department today. The last reading we obtained was  BP: 130/81 . Please read the guidelines below about what these numbers mean and what you should do about them.  If your systolic blood pressure (the top number) is less than 120 and your diastolic blood pressure (the bottom number) is less than 80, then your blood pressure is normal. There is nothing more that you need to do about it.  If your systolic blood pressure (the top number) is 120-139 or your diastolic blood pressure (the bottom number) is 80-89, your blood pressure may be higher than it should be. You should have your blood pressure rechecked within a year by a primary care provider.  If your systolic blood pressure (the top number) is 140 or greater or your diastolic blood pressure (the bottom number) is 90 or greater, you may have high blood pressure. High blood pressure is treatable, but if left untreated over time it can put you at risk for heart attack, stroke, or kidney failure. You should have your blood pressure rechecked by a primary care provider within the next 4 weeks.  If your provider in the emergency department today gave you specific instructions to follow-up with your doctor or provider even sooner than that, you should follow that instruction and not wait for up to 4 weeks for your follow-up visit.        Thank you for choosing [facility]       Thank you for choosing [facility] for your care. Our goal is always to provide you with excellent care. Hearing back from our patients is one way we can continue to improve our services. Please take a few minutes to complete the written survey that you may receive in the mail after you visit with us. Thank you!        [email or web address] Information     [facility] gives you secure access to your electronic health record. If you see a primary care provider, you can also send messages to your care team and make appointments. If you have questions, please call  your primary care clinic.  If you do not have a primary care provider, please call [phone] and they will assist you.        Care EveryWhere ID     This is your Care EveryWhere ID. This could be used by other organizations to access your [place] medical records  [id number]        After Visit Summary       This is your record. Keep this with you and show to your community pharmacist(s) and doctor(s) at your next visit.

## 2017-03-18 NOTE — ED AVS SNAPSHOT
Emergency Department    [address] [place] [address]    Phone:  [phone]    Fax:  [phone]                                       Dustin Avila   MRN: [id number]    Department:   Emergency Department   Date of Visit:  3/18/2017           After Visit Summary Signature Page     I have received my discharge instructions, and my questions have been answered. I have discussed any challenges I see with this plan with the nurse or doctor.    ..........................................................................................................................................  Patient/Patient Representative Signature      ..........................................................................................................................................  Patient Representative Print Name and Relationship to Patient    ..................................................               ................................................  Date                                            Time    ..........................................................................................................................................  Reviewed by Signature/Title    ...................................................              ..............................................  Date                                                            Time

## 2017-03-18 NOTE — DISCHARGE INSTRUCTIONS
It is suspected that you have ruptured your L biceps muscle.  It is important that you follow up soon with Dr. Stevenson, an Orthopedist, by calling his Care Coordinator Alex on Monday morning at [phone] to make an appointment for recheck early next week in clinic.  In the meantime, do not do any lifting with your L arm/hand, and use the pain medication as needed.        Opioid Medication Information    You have been given a prescription for an opioid (narcotic) pain medicine and/or have received a pain medicine while here in the Emergency Department. These medicines can make you drowsy or impaired. You must not drive, operate dangerous equipment, or engage in any other dangerous activities while taking these medications. If you drive while taking these medications, you could be arrested for DUI, or driving under the influence. Do not drink any alcohol while you are taking these medications.   Opioid pain medications can cause addiction. If you have a history of chemical dependency of any type, you are at a higher risk of becoming addicted to pain medications.  Only take these prescribed medications to treat your pain when all other options have been tried. Take it for as short a time and as few doses as possible. Store your pain pills in a secure place, as they are frequently stolen and provide a dangerous opportunity for children or visitors in your house to start abusing these powerful medications. We will not replace any lost or stolen medicine.  As soon as your pain is better, you should flush all your remaining medication.   Many prescription pain medications contain Tylenol  (acetaminophen), including Vicodin , Tylenol #3 , Norco , Lortab , and Percocet .  You should not take any extra pills of Tylenol  if you are using these prescription medications or you can get very sick.  Do not ever take more than 4000 mg of acetaminophen in any 24 hour period.  All opioids tend to cause constipation. Drink plenty  of water and eat foods that have a lot of fiber, such as fruits, vegetables, prune juice, apple juice and high fiber cereal.  Take a laxative if you don t move your bowels at least every other day. Miralax , Milk of Magnesia, Colace , or Senna  can be used to keep you regular.

## 2017-03-21 ENCOUNTER — OFFICE VISIT (OUTPATIENT)
Dept: CARDIOLOGY | Facility: CLINIC | Age: 47
End: 2017-03-21
Payer: COMMERCIAL

## 2017-03-21 VITALS
DIASTOLIC BLOOD PRESSURE: 80 MMHG | WEIGHT: 189 LBS | HEART RATE: 68 BPM | BODY MASS INDEX: 29.66 KG/M2 | HEIGHT: 67 IN | SYSTOLIC BLOOD PRESSURE: 110 MMHG

## 2017-03-21 DIAGNOSIS — I25.10 CORONARY ARTERY DISEASE INVOLVING NATIVE CORONARY ARTERY OF NATIVE HEART WITHOUT ANGINA PECTORIS: Primary | Chronic | ICD-10-CM

## 2017-03-21 DIAGNOSIS — I21.4 NSTEMI (NON-ST ELEVATED MYOCARDIAL INFARCTION) (H): ICD-10-CM

## 2017-03-21 DIAGNOSIS — I45.6 WPW (WOLFF-PARKINSON-WHITE SYNDROME): ICD-10-CM

## 2017-03-21 DIAGNOSIS — E78.5 HYPERLIPIDEMIA LDL GOAL <70: ICD-10-CM

## 2017-03-21 PROCEDURE — 99214 OFFICE O/P EST MOD 30 MIN: CPT | Performed by: INTERNAL MEDICINE

## 2017-03-21 NOTE — PROGRESS NOTES
HISTORY OF PRESENT ILLNESS:  I had the pleasure of seeing your patient, Dustin Avila, at [facility] for evaluation of coronary artery disease.  The patient is a 46-year-old male followed by Dr. Landry Kinney for coronary artery disease.  The patient suffered a non-ST elevation myocardial infarction 06/2016.  Drug-eluting stents were placed in the LAD, ramus intermedius branch and first obtuse marginal branches.  He was left with an occluded posterolateral branch artery of the right coronary artery that filled by left-to-right collaterals.  Ejection fraction was 50%-55%.  The patient also has Joshua-Parkinson-White syndrome diagnosed at [facility] in 2011.  He is asymptomatic and was seen by EP consultation who recommended outpatient Holter monitor and stress testing.  The patient now has ruptured his left biceps tendon and has scheduled repair for 04/04.  His orthopedist would like him to stop his Plavix 10 days prior to this procedure.  The patient is seeking cardiac clearance.  He denies angina pectoris.  He was seen at [facility] on 01/18 for vague shortness of breath and chest discomfort.  He ruled out for a myocardial infarction.  A stress echocardiogram was performed that demonstrated no evidence of ischemia or infarction with the patient exercising 12 minutes 45 seconds.  The patient denies angina pectoris in general.  He continues to smoke 1 pack of cigarettes every other day, quitting this habit  yesterday.  His last cholesterol profile in July was under excellent control.  He is taking his other medications faithfully.      PHYSICAL EXAMINATION:  Current blood pressure is 110/80, pulse 68 and regular, weight is 189 pounds, BMI is 30.  HEENT is benign without xanthelasma.  Chest is clear to auscultation.  Cardiac exam:  Regular rate and rhythm, normal S1 and S2 without gallop or murmur.  No JVD.  Pulses are all intact without bruits.  Abdomen is benign  without organomegaly.  Extremities without cyanosis, clubbing or edema.  Extremities show his left arm in a sling.      ASSESSMENT:   1.  Dustin Webster is a pleasant 46-year-old male status post non-ST elevation myocardial infarction in 06/2016.  He has now had 10 months of aspirin every other day and daily Plavix.  His aspirin was moved to every other day because of severe bruising.  I think it is reasonable to stop his Plavix for 10 days prior to his orthopedic surgery.  Most reports suggest it is safest to continue aspirin and Plavix for at least 6 months before consideration of stopping either one.  Most current reports also suggest briefly stopping the Plavix after 6 months is reasonable for more emergency surgery.  I would like this patient, however, to continue with aspirin 81 mg every day up to and including the time of his surgery if the orthopedic surgeon will allow this.  This does decrease his risk of stent thrombosis.  If he is not permitted to take aspirin or Plavix for 10 days before this, his risk goes up a bit.  The patient is aware of this.  Nonetheless, I would like to see the patient back on aspirin and Plavix as soon as possible after his surgery.  The patient is otherwise at low risk for both morbidity and mortality with this tendon repair.      It is my pleasure to assist in the care of Dustin Webster.  All his questions were answered to his satisfaction.  He will make a followup appointment with Dr. Kinney for June to consider stopping his Plavix permanently.  It is not clear to me that stress testing will be necessary in June since it was done in January.  All the patient's questions were answered to his satisfaction.      Dustin Bojorquez MD      cc:   Oscar Husain MD   [facility] [address]         DUSTIN BOJORQUEZ MD, [facility]             D: 03/21/2017 09:23   T: 03/21/2017 13:34   MT: [email or web address]      Name:     DUSTIN WEBSTER   MRN:      [id number]         Account:      [id number]   :      1970           Service Date: 2017      Document: [id number]

## 2017-03-21 NOTE — PROGRESS NOTES
HPI and Plan:   See dictation:[id number]    No orders of the defined types were placed in this encounter.      No orders of the defined types were placed in this encounter.      There are no discontinued medications.      Encounter Diagnoses   Name Primary?     Coronary artery disease involving native coronary artery of native heart without angina pectoris Yes     WPW (Joshua-Parkinson-White syndrome)      Hyperlipidemia LDL goal <70        CURRENT MEDICATIONS:  Current Outpatient Prescriptions   Medication Sig Dispense Refill     atorvastatin (LIPITOR) 20 MG tablet TAKE 1 TABLET(20 MG) BY MOUTH DAILY 30 tablet 3     metoprolol (TOPROL-XL) 50 MG 24 hr tablet TAKE 1 TABLET(50 MG) BY MOUTH DAILY 90 tablet 3     aspirin EC 81 MG EC tablet Take 1 tablet (81 mg) by mouth daily (Patient taking differently: Take 81 mg by mouth Monday, Wednesday and Friday) 90 tablet 3     nitroglycerin (NITROSTAT) 0.4 MG SL tablet Place 1 tablet (0.4 mg) under the tongue every 5 minutes as needed for chest pain if you are still having symptoms after 3 doses (15 minutes) call 911. 25 tablet 11     clopidogrel (PLAVIX) 75 MG tablet Take 1 tablet (75 mg) by mouth daily 90 tablet 3     oxyCODONE (ROXICODONE) 5 MG IR tablet Take 2 tablets (10 mg) by mouth every 6 hours as needed for moderate to severe pain 20 tablet 0       ALLERGIES   No Known Allergies    PAST MEDICAL HISTORY:  Past Medical History   Diagnosis Date     Achilles tendonitis      Ankle edema      Arthritis      Coronary artery disease      cardiac cath 2016:  + PIERRE to OM1, PIERRE to LAd, PIERRE to ramus     Hyperlipidemia      Incidental lung nodule      Osgood-Schlatter/osteochondroses      left     WPW (Joshua-Parkinson-White syndrome)        PAST SURGICAL HISTORY:  Past Surgical History   Procedure Laterality Date     Orthopedic surgery       left leg achilles rupture     Orthopedic surgery       right hip replacemnent     Hernia repair       Septoplasty       Arthroscopy knee   "5/8/2012     Procedure:ARTHROSCOPY KNEE; left knee arthroscopy, partial medial and lateral meniscectomy ; Surgeon:MARIA M THOMAS; Location: OR       FAMILY HISTORY:  Family History   Problem Relation Age of Onset     DIABETES Father        SOCIAL HISTORY:  Social History     Social History     Marital status:      Spouse name: seperated     Number of children: 2     Years of education: N/A     Occupational History     self      [employer]      Social History Main Topics     Smoking status: Current Every Day Smoker     Packs/day: 0.50     Years: 35.00     Types: Cigarettes     Last attempt to quit: 6/4/2016     Smokeless tobacco: Never Used      Comment: \"stopped today\"     Alcohol use 0.0 oz/week     0 Standard drinks or equivalent per week      Comment: Rare      Drug use: Yes     Special: Marijuana     Sexual activity: Yes     Partners: Female     Other Topics Concern     Parent/Sibling W/ Cabg, Mi Or Angioplasty Before 65f 55m? No     Caffeine Concern No     Sleep Concern Yes     Stress Concern Yes     Weight Concern Yes     weight loss     Exercise Yes     cardiac rehab 3 days week     Social History Narrative       Review of Systems:  Skin:  Positive for bruising     Eyes:  Negative      ENT:  Negative      Respiratory:  Negative       Cardiovascular:  Negative      Gastroenterology: Negative      Genitourinary:  not assessed      Musculoskeletal:  Positive for joint pain;joint stiffness    Neurologic:  Negative      Psychiatric:  Negative      Heme/Lymph/Imm:  Negative      Endocrine:  Negative        Physical Exam:  Vitals: /80 (BP Location: Right arm, Cuff Size: Adult Large)  Pulse 68  Ht 1.702 m (5' 7\")  Wt 85.7 kg (189 lb)  BMI 29.6 kg/m2    Constitutional:  cooperative, alert and oriented, well developed, well nourished, in no acute distress        Skin:  warm and dry to the touch, no apparent skin lesions or masses noted        Head:  normocephalic, no masses or lesions        Eyes:  pupils " equal and round, conjunctivae and lids unremarkable, sclera white, no xanthalasma, EOMS intact, no nystagmus        ENT:  no pallor or cyanosis, dentition good        Neck:  carotid pulses are full and equal bilaterally, JVP normal, no carotid bruit, no thyromegaly        Chest:  normal breath sounds, clear to auscultation, normal A-P diameter, normal symmetry, normal respiratory excursion, no use of accessory muscles          Cardiac: regular rhythm, normal S1/S2, no S3 or S4, apical impulse not displaced, no murmurs, gallops or rubs                  Abdomen:  abdomen soft, non-tender, BS normoactive, no mass, no HSM, no bruits        Vascular: pulses full and equal, no bruits auscultated                                        Extremities and Back:  no edema;no spinal abnormalities noted         left arm in a sling    Neurological:  affect appropriate, oriented to time, person and place;no gross motor deficits              CC  No referring provider defined for this encounter.

## 2017-03-21 NOTE — MR AVS SNAPSHOT
After Visit Summary   3/21/2017    Dustin Avila    MRN: [id number]           Patient Information     Date Of Birth          1970        Visit Information        Provider Department      3/21/2017 8:30 AM Dustin Mendoza MD [facility]        Today's Diagnoses     Coronary artery disease involving native coronary artery of native heart without angina pectoris    -  1    WPW (Joshua-Parkinson-White syndrome)        Hyperlipidemia LDL goal <70           Follow-ups after your visit        Your next 10 appointments already scheduled     Apr 06, 2017  1:00 PM CDT   Return Visit with Cecilia Bansal, [facility] [place] ([facility] [place])    [address]  [place] [id number] [phone]            Apr 27, 2017  1:00 PM CDT   Return Visit with Cecilia Bansal, [facility] [place] ([facility] [place])    [address]  [place] [id number] [phone]              Who to contact     If you have questions or need follow up information about today's clinic visit or your schedule please contact [facility] directly at [phone].  Normal or non-critical lab and imaging results will be communicated to you by i[employer], letter or phone within 4 business days after the clinic has received the results. If you do not hear from us within 7 days, please contact the clinic through iHydroRunhart or phone. If you have a critical or abnormal lab result, we will notify you by phone as soon as possible.  Submit refill requests through [employer] or call your pharmacy and they will forward the refill request to us. Please allow 3 business days for your refill to be completed.          Additional Information About Your Visit        iHydroRunhart Information     [employer] gives you secure access to your electronic health record. If you see a primary care provider, you can also send messages to your  "care team and make appointments. If you have questions, please call your primary care clinic.  If you do not have a primary care provider, please call [phone] and they will assist you.        Care EveryWhere ID     This is your Care EveryWhere ID. This could be used by other organizations to access your [facility] medical records  [id number]        Your Vitals Were     Pulse Height BMI (Body Mass Index)             68 1.702 m (5' 7\") 29.6 kg/m2          Blood Pressure from Last 3 Encounters:   03/21/17 110/80   03/18/17 130/81   01/18/17 138/86    Weight from Last 3 Encounters:   03/21/17 85.7 kg (189 lb)   03/18/17 83.9 kg (185 lb)   01/18/17 87.3 kg (192 lb 7.4 oz)              Today, you had the following     No orders found for display         Today's Medication Changes          These changes are accurate as of: 3/21/17  9:05 AM.  If you have any questions, ask your nurse or doctor.               These medicines have changed or have updated prescriptions.        Dose/Directions    aspirin 81 MG EC tablet   This may have changed:    - when to take this  - additional instructions   Used for:  NSTEMI (non-ST elevated myocardial infarction) (H)        Dose:  81 mg   Take 1 tablet (81 mg) by mouth daily   Quantity:  90 tablet   Refills:  3                Primary Care Provider Office Phone # Fax #    Oscar Husain [phone]       [facility] [place] [address] [place] [address] [address]  [place] [address]        Thank you!     Thank you for choosing [facility] [place]  for your care. Our goal is always to provide you with excellent care. Hearing back from our patients is one way we can continue to improve our services. Please take a few minutes to complete the written survey that you may receive in the mail after your visit with us. Thank you!             Your Updated Medication List - Protect others around you: Learn how to safely use, store and throw away your " medicines at www.disposemymeds.org.          This list is accurate as of: 3/21/17  9:05 AM.  Always use your most recent med list.                   Brand Name Dispense Instructions for use    aspirin 81 MG EC tablet     90 tablet    Take 1 tablet (81 mg) by mouth daily       atorvastatin 20 MG tablet    LIPITOR    30 tablet    TAKE 1 TABLET(20 MG) BY MOUTH DAILY       clopidogrel 75 MG tablet    PLAVIX    90 tablet    Take 1 tablet (75 mg) by mouth daily       metoprolol 50 MG 24 hr tablet    TOPROL-XL    90 tablet    TAKE 1 TABLET(50 MG) BY MOUTH DAILY       nitroglycerin 0.4 MG sublingual tablet    NITROSTAT    25 tablet    Place 1 tablet (0.4 mg) under the tongue every 5 minutes as needed for chest pain if you are still having symptoms after 3 doses (15 minutes) call 911.       oxyCODONE 5 MG IR tablet    ROXICODONE    20 tablet    Take 2 tablets (10 mg) by mouth every 6 hours as needed for moderate to severe pain

## 2017-03-21 NOTE — LETTER
3/21/2017    Oscar Husain MD  [facility]   [address] [place] [address] [address]  [place] [address]    RE: Dustin OLVERA [place]       Dear Colleague,    I had the pleasure of seeing your patient, Dustin Avila, at [facility] for evaluation of coronary artery disease.  The patient is a 46-year-old male followed by Dr. Landry Kinney for coronary artery disease.  The patient suffered a non-ST elevation myocardial infarction 06/2016.  Drug-eluting stents were placed in the LAD, ramus intermedius branch and first obtuse marginal branches.  He was left with an occluded posterolateral branch artery of the right coronary artery that filled by left-to-right collaterals.  Ejection fraction was 50%-55%.  The patient also has Joshua-Parkinson-White syndrome diagnosed at [facility] in 2011.  He is asymptomatic and was seen by EP consultation who recommended outpatient Holter monitor and stress testing.  The patient now has ruptured his left biceps tendon and has scheduled repair for 04/04.  His orthopedist would like him to stop his Plavix 10 days prior to this procedure.  The patient is seeking cardiac clearance.  He denies angina pectoris.  He was seen at [facility] on 01/18 for vague shortness of breath and chest discomfort.  He ruled out for a myocardial infarction.  A stress echocardiogram was performed that demonstrated no evidence of ischemia or infarction with the patient exercising 12 minutes 45 seconds.  The patient denies angina pectoris in general.  He continues to smoke 1 pack of cigarettes every other day, quitting this habit  yesterday.  His last cholesterol profile in July was under excellent control.  He is taking his other medications faithfully.      PHYSICAL EXAMINATION:  Current blood pressure is 110/80, pulse 68 and regular, weight is 189 pounds, BMI is 30.  HEENT is benign without xanthelasma.  Chest is clear to auscultation.  Cardiac exam:  Regular rate and  rhythm, normal S1 and S2 without gallop or murmur.  No JVD.  Pulses are all intact without bruits.  Abdomen is benign without organomegaly.  Extremities without cyanosis, clubbing or edema.  Extremities show his left arm in a sling.     Outpatient Encounter Prescriptions as of 3/21/2017   Medication Sig Dispense Refill     atorvastatin (LIPITOR) 20 MG tablet TAKE 1 TABLET(20 MG) BY MOUTH DAILY 30 tablet 3     metoprolol (TOPROL-XL) 50 MG 24 hr tablet TAKE 1 TABLET(50 MG) BY MOUTH DAILY 90 tablet 3     aspirin EC 81 MG EC tablet Take 1 tablet (81 mg) by mouth daily (Patient taking differently: Take 81 mg by mouth Monday, Wednesday and Friday) 90 tablet 3     nitroglycerin (NITROSTAT) 0.4 MG SL tablet Place 1 tablet (0.4 mg) under the tongue every 5 minutes as needed for chest pain if you are still having symptoms after 3 doses (15 minutes) call 911. 25 tablet 11     clopidogrel (PLAVIX) 75 MG tablet Take 1 tablet (75 mg) by mouth daily 90 tablet 3     oxyCODONE (ROXICODONE) 5 MG IR tablet Take 2 tablets (10 mg) by mouth every 6 hours as needed for moderate to severe pain 20 tablet 0     No facility-administered encounter medications on file as of 3/21/2017.       ASSESSMENT:   1.  Dustin Avila is a pleasant 46-year-old male status post non-ST elevation myocardial infarction in 06/2016.  He has now had 10 months of aspirin every other day and daily Plavix.  His aspirin was moved to every other day because of severe bruising.  I think it is reasonable to stop his Plavix for 10 days prior to his orthopedic surgery.  Most reports suggest it is safest to continue aspirin and Plavix for at least 6 months before consideration of stopping either one.  Most current reports also suggest briefly stopping the Plavix after 6 months is reasonable for more emergency surgery.  I would like this patient, however, to continue with aspirin 81 mg every day up to and including the time of his surgery if the orthopedic surgeon will allow  this.  This does decrease his risk of stent thrombosis.  If he is not permitted to take aspirin or Plavix for 10 days before this, his risk goes up a bit.  The patient is aware of this.  Nonetheless, I would like to see the patient back on aspirin and Plavix as soon as possible after his surgery.  The patient is otherwise at low risk for both morbidity and mortality with this tendon repair.      It is my pleasure to assist in the care of Dustin Avila.  All his questions were answered to his satisfaction.  He will make a followup appointment with Dr. Kinney for June to consider stopping his Plavix permanently.  It is not clear to me that stress testing will be necessary in June since it was done in January.  All the patient's questions were answered to his satisfaction.     Sincerely,    Dustin Mendoza MD     [facility]

## 2017-03-31 ENCOUNTER — OFFICE VISIT (OUTPATIENT)
Dept: FAMILY MEDICINE | Facility: CLINIC | Age: 47
End: 2017-03-31
Payer: COMMERCIAL

## 2017-03-31 VITALS
BODY MASS INDEX: 30.23 KG/M2 | TEMPERATURE: 97.3 F | HEART RATE: 80 BPM | WEIGHT: 193 LBS | DIASTOLIC BLOOD PRESSURE: 70 MMHG | SYSTOLIC BLOOD PRESSURE: 96 MMHG

## 2017-03-31 DIAGNOSIS — Z01.818 PREOP GENERAL PHYSICAL EXAM: Primary | ICD-10-CM

## 2017-03-31 DIAGNOSIS — I25.10 CORONARY ARTERY DISEASE INVOLVING NATIVE CORONARY ARTERY OF NATIVE HEART WITHOUT ANGINA PECTORIS: Chronic | ICD-10-CM

## 2017-03-31 DIAGNOSIS — F17.200 SMOKING ADDICTION: ICD-10-CM

## 2017-03-31 DIAGNOSIS — I45.6 WPW (WOLFF-PARKINSON-WHITE SYNDROME): ICD-10-CM

## 2017-03-31 DIAGNOSIS — I21.4 NSTEMI (NON-ST ELEVATED MYOCARDIAL INFARCTION) (H): Chronic | ICD-10-CM

## 2017-03-31 DIAGNOSIS — S46.219S: ICD-10-CM

## 2017-03-31 LAB
ANION GAP SERPL CALCULATED.3IONS-SCNC: 7 MMOL/L (ref 3–14)
BUN SERPL-MCNC: 15 MG/DL (ref 7–30)
CALCIUM SERPL-MCNC: 9.1 MG/DL (ref 8.5–10.1)
CHLORIDE SERPL-SCNC: 106 MMOL/L (ref 94–109)
CO2 SERPL-SCNC: 26 MMOL/L (ref 20–32)
CREAT SERPL-MCNC: 0.78 MG/DL (ref 0.66–1.25)
ERYTHROCYTE [DISTWIDTH] IN BLOOD BY AUTOMATED COUNT: 14.1 % (ref 10–15)
GFR SERPL CREATININE-BSD FRML MDRD: NORMAL ML/MIN/1.7M2
GLUCOSE SERPL-MCNC: 93 MG/DL (ref 70–99)
HCT VFR BLD AUTO: 44.1 % (ref 40–53)
HGB BLD-MCNC: 14.5 G/DL (ref 13.3–17.7)
MCH RBC QN AUTO: 30.2 PG (ref 26.5–33)
MCHC RBC AUTO-ENTMCNC: 32.9 G/DL (ref 31.5–36.5)
MCV RBC AUTO: 92 FL (ref 78–100)
PLATELET # BLD AUTO: 267 10E9/L (ref 150–450)
POTASSIUM SERPL-SCNC: 4.3 MMOL/L (ref 3.4–5.3)
RBC # BLD AUTO: 4.8 10E12/L (ref 4.4–5.9)
SODIUM SERPL-SCNC: 139 MMOL/L (ref 133–144)
WBC # BLD AUTO: 11.5 10E9/L (ref 4–11)

## 2017-03-31 PROCEDURE — 93000 ELECTROCARDIOGRAM COMPLETE: CPT | Performed by: FAMILY MEDICINE

## 2017-03-31 PROCEDURE — 99215 OFFICE O/P EST HI 40 MIN: CPT | Performed by: FAMILY MEDICINE

## 2017-03-31 PROCEDURE — 36415 COLL VENOUS BLD VENIPUNCTURE: CPT | Performed by: FAMILY MEDICINE

## 2017-03-31 PROCEDURE — 80048 BASIC METABOLIC PNL TOTAL CA: CPT | Performed by: FAMILY MEDICINE

## 2017-03-31 PROCEDURE — 85027 COMPLETE CBC AUTOMATED: CPT | Performed by: FAMILY MEDICINE

## 2017-03-31 NOTE — MR AVS SNAPSHOT
After Visit Summary   3/31/2017    Dustin Avila    MRN: [id number]           Patient Information     Date Of Birth          1970        Visit Information        Provider Department      3/31/2017 7:40 AM Oscar Husain MD [facility]        Today's Diagnoses     Preop general physical exam    -  1    NSTEMI (non-ST elevated myocardial infarction) (H)        Coronary artery disease involving native coronary artery of native heart without angina pectoris        Smoking addiction        WPW (Joshua-Parkinson-White syndrome)        Biceps tendon rupture, unspecified laterality, sequela          Care Instructions      Before Your Surgery      Call your surgeon if there is any change in your health. This includes signs of a cold or flu (such as a sore throat, runny nose, cough, rash or fever).    Do not smoke, drink alcohol or take over the counter medicine (unless your surgeon or primary care doctor tells you to) for the 24 hours before and after surgery.    If you take prescribed drugs: Follow your doctor s orders about which medicines to take and which to stop until after surgery.    Eating and drinking prior to surgery: follow the instructions from your surgeon    Take a shower or bath the night before surgery. Use the soap your surgeon gave you to gently clean your skin. If you do not have soap from your surgeon, use your regular soap. Do not shave or scrub the surgery site.  Wear clean pajamas and have clean sheets on your bed.         Follow-ups after your visit        Your next 10 appointments already scheduled     Apr 06, 2017  1:00 PM CDT   Return Visit with Cecilia Bansal, [facility] [place] ([facility] [place])    [address] [place] [address] [address]  [facility] [address] [phone]            Apr 27, 2017  1:00 PM CDT   Return Visit with Cecilia Bansal, [facility] ([facility])    [address] [place] [address] [address]  [facility] [address]    [phone]              Who to contact     If you have questions or need follow up information about today's clinic visit or your schedule please contact [facility] [place] directly at [phone].  Normal or non-critical lab and imaging results will be communicated to you by MyChart, letter or phone within 4 business days after the clinic has received the results. If you do not hear from us within 7 days, please contact the clinic through MyChart or phone. If you have a critical or abnormal lab result, we will notify you by phone as soon as possible.  Submit refill requests through [employer] or call your pharmacy and they will forward the refill request to us. Please allow 3 business days for your refill to be completed.          Additional Information About Your Visit        Qulsarhart Information     [employer] gives you secure access to your electronic health record. If you see a primary care provider, you can also send messages to your care team and make appointments. If you have questions, please call your primary care clinic.  If you do not have a primary care provider, please call [phone] and they will assist you.        Care EveryWhere ID     This is your Care EveryWhere ID. This could be used by other organizations to access your [facility] medical records  [id number]        Your Vitals Were     Pulse Temperature BMI (Body Mass Index)             80 97.3  F (36.3  C) (Tympanic) 30.23 kg/m2          Blood Pressure from Last 3 Encounters:   03/31/17 96/70   03/21/17 110/80   03/18/17 130/81    Weight from Last 3 Encounters:   03/31/17 193 lb (87.5 kg)   03/21/17 189 lb (85.7 kg)   03/18/17 185 lb (83.9 kg)              We Performed the Following     Basic metabolic panel     CBC with platelets     EKG 12-lead complete w/read - Clinics          Today's Medication Changes          These changes are accurate as of: 3/31/17  2:24 PM.  If you have any questions, ask your nurse or doctor.                These medicines have changed or have updated prescriptions.        Dose/Directions    aspirin 81 MG EC tablet   This may have changed:    - when to take this  - additional instructions   Used for:  NSTEMI (non-ST elevated myocardial infarction) (H)        Dose:  81 mg   Take 1 tablet (81 mg) by mouth daily   Quantity:  90 tablet   Refills:  3                Primary Care Provider Office Phone # Fax #    Oscar Husain [phone]       [facility] [address] [place] [address] [address]  [place] [address]        Thank you!     Thank you for choosing [facility]  for your care. Our goal is always to provide you with excellent care. Hearing back from our patients is one way we can continue to improve our services. Please take a few minutes to complete the written survey that you may receive in the mail after your visit with us. Thank you!             Your Updated Medication List - Protect others around you: Learn how to safely use, store and throw away your medicines at www.disposemymeds.org.          This list is accurate as of: 3/31/17  2:24 PM.  Always use your most recent med list.                   Brand Name Dispense Instructions for use    aspirin 81 MG EC tablet     90 tablet    Take 1 tablet (81 mg) by mouth daily       atorvastatin 20 MG tablet    LIPITOR    30 tablet    TAKE 1 TABLET(20 MG) BY MOUTH DAILY       clopidogrel 75 MG tablet    PLAVIX    90 tablet    Take 1 tablet (75 mg) by mouth daily       metoprolol 50 MG 24 hr tablet    TOPROL-XL    90 tablet    TAKE 1 TABLET(50 MG) BY MOUTH DAILY       nitroglycerin 0.4 MG sublingual tablet    NITROSTAT    25 tablet    Place 1 tablet (0.4 mg) under the tongue every 5 minutes as needed for chest pain if you are still having symptoms after 3 doses (15 minutes) call 911.       oxyCODONE 5 MG IR tablet    ROXICODONE    20 tablet    Take 2 tablets (10 mg) by mouth every 6 hours as needed for moderate to severe pain

## 2017-03-31 NOTE — PROGRESS NOTES
[facility] [place]  [address] [address]  [phone]  Dept: [phone]    PRE-OP EVALUATION:  Today's date: 3/31/2017    Dustin Avila (: 1970) presents for pre-operative evaluation assessment as requested by Dr. Stevenson.  He requires evaluation and anesthesia risk assessment prior to undergoing surgery/procedure for treatment of ruptured left bicep.      Date of Surgery/ Procedure: 2017  Time of Surgery/ Procedure: 12:45am  Hospital/Surgical Facility: [facility] fax # [phone]    Primary Physician: Oscar Husain  Type of Anesthesia Anticipated: to be determined    Patient has a Health Care Directive or Living Will:  NO    1. YES - Do you have a history of heart attack, stroke, stent, bypass or surgery on an artery in the head, neck, heart or legs?  2. NO - Do you ever have any pain or discomfort in your chest?  3. NO - Do you have a history of  Heart Failure?  4. NO - Are you troubled by shortness of breath when: walking on the level, up a slight hill or at night?  5. NO - Do you currently have a cold, bronchitis or other respiratory infection?  6. NO - Do you have a cough, shortness of breath or wheezing?  7. NO - Do you sometimes get pains in the calves of your legs when you walk?  8. NO - Do you or anyone in your family have previous history of blood clots?  9. NO - Do you or does anyone in your family have a serious bleeding problem such as prolonged bleeding following surgeries or cuts?  10. NO - Have you ever had problems with anemia or been told to take iron pills?  11. NO - Have you had any abnormal blood loss such as black, tarry or bloody stools, or abnormal vaginal bleeding?  12. NO - Have you ever had a blood transfusion?  13. NO - Have you or any of your relatives ever had problems with anesthesia?  14. NO - Do you have sleep apnea, excessive snoring or daytime drowsiness?  15. NO - Do you have any prosthetic heart valves?  16. YES - Do you have  prosthetic joints?  17. NO - Is there any chance that you may be pregnant?      HPI:                                                      Brief HPI related to upcoming procedure:     Patient has heart issues and had stent placed almost 9 month go, he has been taking Plavix, but recent cardiology visit he discuss the surgery , he was advised to stop Plavix if this is necessary but continue the Asprin.  He denies any chest pain, shortness of breath, denies any other concerns    MEDICAL HISTORY:                                                      Patient Active Problem List    Diagnosis Date Noted     Anxiety 03/13/2017     Priority: Medium     Atypical chest pain 01/18/2017     Priority: Medium     JOHN (generalized anxiety disorder) 11/15/2016     Priority: Medium     NSTEMI (non-ST elevated myocardial infarction) (H) 07/27/2016     Priority: Medium     WPW (Joshua-Parkinson-White syndrome)      Priority: Medium     Coronary artery disease involving native coronary artery of native heart without angina pectoris      Priority: Medium     cardiac cath 2016:  + PIERRE to OM1, PIERRE to LAd, PIERRE to ramus       Hyperlipidemia      Priority: Medium     Hyperlipidemia LDL goal <70 06/17/2016     Priority: Medium     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     Priority: Medium     As per history        Past Medical History:   Diagnosis Date     Achilles tendonitis      Ankle edema      Arthritis      Coronary artery disease     cardiac cath 2016:  + PIERRE to OM1, PIERRE to LAd, PIERRE to ramus     Hyperlipidemia      Incidental lung nodule      Osgood-Schlatter/osteochondroses     left     WPW (Joshua-Parkinson-White syndrome)      Past Surgical History:   Procedure Laterality Date     ARTHROSCOPY KNEE  5/8/2012    Procedure:ARTHROSCOPY KNEE; left knee arthroscopy, partial medial and lateral meniscectomy ; Surgeon:MARIA M THOMAS; Location:[facility] OR     HERNIA REPAIR       ORTHOPEDIC SURGERY      left leg achilles rupture      ORTHOPEDIC SURGERY      right hip replacemnent     SEPTOPLASTY       Current Outpatient Prescriptions   Medication Sig Dispense Refill     atorvastatin (LIPITOR) 20 MG tablet TAKE 1 TABLET(20 MG) BY MOUTH DAILY 30 tablet 3     metoprolol (TOPROL-XL) 50 MG 24 hr tablet TAKE 1 TABLET(50 MG) BY MOUTH DAILY 90 tablet 3     aspirin EC 81 MG EC tablet Take 1 tablet (81 mg) by mouth daily (Patient taking differently: Take 81 mg by mouth Monday, Wednesday and Friday) 90 tablet 3     oxyCODONE (ROXICODONE) 5 MG IR tablet Take 2 tablets (10 mg) by mouth every 6 hours as needed for moderate to severe pain 20 tablet 0     nitroglycerin (NITROSTAT) 0.4 MG SL tablet Place 1 tablet (0.4 mg) under the tongue every 5 minutes as needed for chest pain if you are still having symptoms after 3 doses (15 minutes) call 911. 25 tablet 11     clopidogrel (PLAVIX) 75 MG tablet Take 1 tablet (75 mg) by mouth daily (Patient not taking: Reported on 3/31/2017) 90 tablet 3     OTC products: None, except as noted above    No Known Allergies   Latex Allergy: NO    Social History   Substance Use Topics     Smoking status: Current Every Day Smoker     Packs/day: 0.50     Years: 35.00     Types: Cigarettes     Smokeless tobacco: Never Used     Alcohol use 0.0 oz/week     0 Standard drinks or equivalent per week      Comment: Rare      History   Drug Use     Yes     Special: Marijuana       REVIEW OF SYSTEMS:                                                    C: NEGATIVE for fever, chills, change in weight  E/M: NEGATIVE for ear, mouth and throat problems  R: NEGATIVE for significant cough or SOB  CV: NEGATIVE for chest pain, palpitations or peripheral edema    EXAM:                                                    BP 96/70  Pulse 80  Temp 97.3  F (36.3  C) (Tympanic)  Wt 193 lb (87.5 kg)  BMI 30.23 kg/m2  GENERAL APPEARANCE: healthy, alert and no distress  HENT: ear canals and TM's normal and nose and mouth without ulcers or lesions  RESP: lungs  clear to auscultation - no rales, rhonchi or wheezes  CV: regular rate and rhythm, normal S1 S2, no S3 or S4 and no murmur, click or rub   ABDOMEN: soft, nontender, no HSM or masses and bowel sounds normal  NEURO: Normal strength and tone, sensory exam grossly normal, mentation intact and speech normal    DIAGNOSTICS:                                                    EKG is normal , normal sinus rhythm.        Results for orders placed or performed in visit on 03/31/17   Basic metabolic panel   Result Value Ref Range    Sodium 139 133 - 144 mmol/L    Potassium 4.3 3.4 - 5.3 mmol/L    Chloride 106 94 - 109 mmol/L    Carbon Dioxide 26 20 - 32 mmol/L    Anion Gap 7 3 - 14 mmol/L    Glucose 93 70 - 99 mg/dL    Urea Nitrogen 15 7 - 30 mg/dL    Creatinine 0.78 0.66 - 1.25 mg/dL    GFR Estimate >90  Non  GFR Calc   >60 mL/min/1.7m2    GFR Estimate If Black >90   GFR Calc   >60 mL/min/1.7m2    Calcium 9.1 8.5 - 10.1 mg/dL   CBC with platelets   Result Value Ref Range    WBC 11.5 (H) 4.0 - 11.0 10e9/L    RBC Count 4.80 4.4 - 5.9 10e12/L    Hemoglobin 14.5 13.3 - 17.7 g/dL    Hematocrit 44.1 40.0 - 53.0 %    MCV 92 78 - 100 fl    MCH 30.2 26.5 - 33.0 pg    MCHC 32.9 31.5 - 36.5 g/dL    RDW 14.1 10.0 - 15.0 %    Platelet Count 267 150 - 450 10e9/L         Recent Labs   Lab Test  01/18/17   0033  06/10/16   1052   06/05/16   1037   09/27/10   1125  09/23/10   0925   HGB  15.0  15.2   < >  14.0   < >   --    --    PLT  243  253   < >  235   < >   --    --    INR   --    --    --    --    --   1.55*  1.43*   NA  139  135   < >   --    < >   --    --    POTASSIUM  3.4  4.2   < >   --    < >   --    --    CR  0.80  0.80   < >   --    < >   --    --    A1C   --    --    --   4.8   --    --    --     < > = values in this interval not displayed.        IMPRESSION:                                                    Reason for surgery/procedure:   Diagnosis/reason for consult:   1. Preop general physical  exam    2. NSTEMI (non-ST elevated myocardial infarction) (H)    3. Coronary artery disease involving native coronary artery of native heart without angina pectoris    4. Smoking addiction    5. WPW (Joshua-Parkinson-White syndrome)    6. Biceps tendon rupture, unspecified laterality, sequela            The proposed surgical procedure is considered INTERMEDIATE risk.    REVISED CARDIAC RISK INDEX  The patient has the following serious cardiovascular risks for perioperative complications such as (MI, PE, VFib and 3  AV Block):  Coronary Artery Disease      The patient has the following additional risks for perioperative complications:  No identified additional risks      ICD-10-CM    1. Preop general physical exam Z01.818 Basic metabolic panel     CBC with platelets   2. NSTEMI (non-ST elevated myocardial infarction) (H) I21.4 EKG 12-lead complete w/read - Clinics   3. Coronary artery disease involving native coronary artery of native heart without angina pectoris I25.10 EKG 12-lead complete w/read - Clinics   4. Smoking addiction F17.200    5. WPW (Joshua-Parkinson-White syndrome) I45.6 EKG 12-lead complete w/read - Clinics   6. Biceps tendon rupture, unspecified laterality, sequela S46.119S        RECOMMENDATIONS:                                                      --Consult hospital rounder / IM to assist post-op medical management    --Patient is to take all scheduled medications on the day of surgery EXCEPT for modifications listed below.  He is on hold for plavix, should continue it as soon as possible after surgery.  He should continue the aspirin and rest of the medications.    APPROVAL GIVEN to proceed with proposed procedure, without further diagnostic evaluation       Signed Electronically by: Oscar Husain MD    Copy of this evaluation report is provided to requesting physician.    [facility] Preop Guidelines

## 2017-04-05 ENCOUNTER — FCC EXTENDED DOCUMENTATION (OUTPATIENT)
Dept: PSYCHOLOGY | Facility: CLINIC | Age: 47
End: 2017-04-05

## 2017-04-05 PROBLEM — I25.2 OLD MYOCARDIAL INFARCTION: Status: ACTIVE | Noted: 2017-04-05

## 2017-04-05 NOTE — PROGRESS NOTES
Discharge Summary  Multiple Sessions-seen twice    Client Name: Dustin Avila MRN#: [id number] YOB: 1970      Intake / Discharge Date: 3/10/17; 3/17/17      DSM5 Diagnoses: (Sustained by DSM5 Criteria Listed Above)  Diagnoses: 300.00 (F41.9) Unspecified Anxiety Disorder  Psychosocial & Contextual Factors: court ordered therapy related to marital difficulty; hx of OFP and chemical abuse.  WHODAS 2.0 (12 item) Score: see intake.          Presenting Concern:  Continued care; needed to leave last therapist due to insurance changes.      Reason for Discharge:  Insurance: Provided [facility] Fee [facility] Information and his current insurance was not carried by provider/FCC.      Disposition at Time of Last Encounter:   Comments:        Risk Management:   Client denies a history of suicidal ideation, suicide attempts, self-injurious behavior, homicidal ideation, homicidal behavior and and other safety concerns  A safety and risk management plan has not been developed at this time, however client was given the after-hours number / 911 should there be a change in any of these risk factors.      Referred To:  None given; ct called Intake to cancel appnts.        Cecilia Bnasal, [facility]   4/5/2017

## 2017-05-07 DIAGNOSIS — E78.5 HYPERLIPIDEMIA LDL GOAL <70: ICD-10-CM

## 2017-05-07 DIAGNOSIS — I21.4 NSTEMI (NON-ST ELEVATED MYOCARDIAL INFARCTION) (H): ICD-10-CM

## 2017-05-08 RX ORDER — ATORVASTATIN CALCIUM 20 MG/1
TABLET, FILM COATED ORAL
Qty: 90 TABLET | Refills: 0 | Status: SHIPPED | OUTPATIENT
Start: 2017-05-08 | End: 2017-09-13

## 2017-05-08 NOTE — TELEPHONE ENCOUNTER
Prescription approved per [facility], [facility] or [facility] refill protocol.  Toyin Tomas RN  Triage [employer]

## 2017-05-08 NOTE — TELEPHONE ENCOUNTER
Atorvastatin      Last Written Prescription Date: 11/8/16  Last Fill Quantity: 30, # refills: 3  [place] Visit [place] [facility] [facility] or [facility] prescribing provider: 3/31/17       Lab Results   Component Value Date    CHOL 122 07/27/2016     Lab Results   Component Value Date    HDL 46 07/27/2016     Lab Results   Component Value Date    LDL 61 07/27/2016     Lab Results   Component Value Date    TRIG 74 07/27/2016     No results found for: CHOLALVERTO

## 2017-05-11 DIAGNOSIS — I21.4 NSTEMI (NON-ST ELEVATED MYOCARDIAL INFARCTION) (H): ICD-10-CM

## 2017-05-11 NOTE — TELEPHONE ENCOUNTER
clopidogrel (PLAVIX) 75 MG tablet           Last Written Prescription Date: 6-8-2016  Last Fill Quantity: 90, # refills: 3    Last Office Visit with [facility] [place], [facility] or [facility] prescribing provider:  3-   Future Office Visit:       Lab Results   Component Value Date    WBC 11.5 03/31/2017     Lab Results   Component Value Date    RBC 4.80 03/31/2017     Lab Results   Component Value Date    HGB 14.5 03/31/2017     Lab Results   Component Value Date    HCT 44.1 03/31/2017     No components found for: MCT  Lab Results   Component Value Date    MCV 92 03/31/2017     Lab Results   Component Value Date    MCH 30.2 03/31/2017     Lab Results   Component Value Date    MCHC 32.9 03/31/2017     Lab Results   Component Value Date    RDW 14.1 03/31/2017     Lab Results   Component Value Date     03/31/2017     Lab Results   Component Value Date    AST 17 01/18/2017     Lab Results   Component Value Date    ALT 30 01/18/2017     Creatinine   Date Value Ref Range Status   03/31/2017 0.78 0.66 - 1.25 mg/dL Final   ]

## 2017-05-12 RX ORDER — CLOPIDOGREL BISULFATE 75 MG/1
75 TABLET ORAL DAILY
Qty: 90 TABLET | Refills: 3 | Status: SHIPPED | OUTPATIENT
Start: 2017-05-12 | End: 2019-09-09

## 2017-07-18 ENCOUNTER — OFFICE VISIT (OUTPATIENT)
Dept: PODIATRY | Facility: CLINIC | Age: 47
End: 2017-07-18
Payer: COMMERCIAL

## 2017-07-18 ENCOUNTER — RADIANT APPOINTMENT (OUTPATIENT)
Dept: GENERAL RADIOLOGY | Facility: CLINIC | Age: 47
End: 2017-07-18
Attending: PODIATRIST
Payer: COMMERCIAL

## 2017-07-18 VITALS
HEIGHT: 67 IN | SYSTOLIC BLOOD PRESSURE: 122 MMHG | BODY MASS INDEX: 31.17 KG/M2 | HEART RATE: 69 BPM | WEIGHT: 198.6 LBS | DIASTOLIC BLOOD PRESSURE: 80 MMHG

## 2017-07-18 DIAGNOSIS — M19.072 OSTEOARTHRITIS OF LEFT FOOT, UNSPECIFIED OSTEOARTHRITIS TYPE: ICD-10-CM

## 2017-07-18 DIAGNOSIS — L84 CALLUS: ICD-10-CM

## 2017-07-18 DIAGNOSIS — M21.6X2 PRONATION OF BOTH FEET: ICD-10-CM

## 2017-07-18 DIAGNOSIS — M79.672 LEFT FOOT PAIN: Primary | ICD-10-CM

## 2017-07-18 DIAGNOSIS — M21.6X1 PRONATION OF BOTH FEET: ICD-10-CM

## 2017-07-18 PROCEDURE — 99203 OFFICE O/P NEW LOW 30 MIN: CPT | Mod: 25 | Performed by: PODIATRIST

## 2017-07-18 PROCEDURE — 73630 X-RAY EXAM OF FOOT: CPT | Mod: LT

## 2017-07-18 PROCEDURE — 11055 PARING/CUTG B9 HYPRKER LES 1: CPT | Performed by: PODIATRIST

## 2017-07-18 NOTE — PROGRESS NOTES
PATIENT HISTORY:  Dustin Avila is a 46 year old male who presents to clinic for left foot concerns.  Pt with longstanding painful heel callus.  He has had it trimmed in the past with some improvement.  Also with left intermittent midfoot pain with activity.  No injury reported.  Nondiabetic.  No treatments.      Review of Systems:  Patient denies fever, chills, rash, wound, numbness, weakness, heart burn, blood in stool, chest pain with activity, calf pain when walking, shortness of breath with activity, chronic cough, easy bleeding/bruising, swelling of ankles, excessive thirst, fatigue, depression, anxiety.  Patient admits to stiffness, limping.     PAST MEDICAL HISTORY:   Past Medical History:   Diagnosis Date     Achilles tendonitis      Ankle edema      Arthritis      Coronary artery disease     cardiac cath 2016:  + PIERRE to OM1, PIERRE to LAd, PIERRE to ramus     Hyperlipidemia      Incidental lung nodule      Osgood-Schlatter/osteochondroses     left     WPW (Joshua-Parkinson-White syndrome)         PAST SURGICAL HISTORY:   Past Surgical History:   Procedure Laterality Date     ARTHROSCOPY KNEE  5/8/2012    Procedure:ARTHROSCOPY KNEE; left knee arthroscopy, partial medial and lateral meniscectomy ; Surgeon:MARIA M THOMAS; Location:[facility] OR     HERNIA REPAIR       ORTHOPEDIC SURGERY      left leg achilles rupture     ORTHOPEDIC SURGERY      right hip replacemnent     SEPTOPLASTY          MEDICATIONS:   Current Outpatient Prescriptions:      clopidogrel (PLAVIX) 75 MG tablet, Take 1 tablet (75 mg) by mouth daily, Disp: 90 tablet, Rfl: 3     atorvastatin (LIPITOR) 20 MG tablet, TAKE 1 TABLET(20 MG) BY MOUTH DAILY, Disp: 90 tablet, Rfl: 0     atorvastatin (LIPITOR) 20 MG tablet, TAKE 1 TABLET(20 MG) BY MOUTH DAILY, Disp: 30 tablet, Rfl: 3     metoprolol (TOPROL-XL) 50 MG 24 hr tablet, TAKE 1 TABLET(50 MG) BY MOUTH DAILY, Disp: 90 tablet, Rfl: 3     aspirin EC 81 MG EC tablet, Take 1 tablet (81 mg) by mouth daily  "(Patient taking differently: Take 81 mg by mouth Monday, Wednesday and Friday), Disp: 90 tablet, Rfl: 3     nitroglycerin (NITROSTAT) 0.4 MG SL tablet, Place 1 tablet (0.4 mg) under the tongue every 5 minutes as needed for chest pain if you are still having symptoms after 3 doses (15 minutes) call 911., Disp: 25 tablet, Rfl: 11     ALLERGIES:  No Known Allergies     SOCIAL HISTORY:   Social History     Social History     Marital status:      Spouse name: seperated     Number of children: 2     Years of education: N/A     Occupational History     self      [employer]      Social History Main Topics     Smoking status: Current Every Day Smoker     Packs/day: 0.50     Years: 35.00     Types: Cigarettes     Smokeless tobacco: Never Used     Alcohol use 0.0 oz/week     0 Standard drinks or equivalent per week      Comment: Rare      Drug use: Yes     Special: Marijuana     Sexual activity: Yes     Partners: Female     Other Topics Concern     Parent/Sibling W/ Cabg, Mi Or Angioplasty Before 65f 55m? No     Caffeine Concern No     Sleep Concern Yes     Stress Concern Yes     Weight Concern Yes     weight loss     Exercise Yes     cardiac rehab 3 days week     Social History Narrative        FAMILY HISTORY:   Family History   Problem Relation Age of Onset     DIABETES Father         EXAM:Vitals: /80 (BP Location: Left arm, Patient Position: Chair, Cuff Size: Adult Large)  Pulse 69  Ht 5' 7\" (1.702 m)  Wt 198 lb 9.6 oz (90.1 kg)  BMI 31.11 kg/m2  BMI= Body mass index is 31.11 kg/(m^2).    General appearance: Patient is alert and fully cooperative with history & exam.  No sign of distress is noted during the visit.     Psychiatric: Affect is pleasant & appropriate.  Patient appears motivated to improve health.     Respiratory: Breathing is regular & unlabored while sitting.     HEENT: Hearing is intact to spoken word.  Speech is clear.  No gross evidence of visual impairment that would impact ambulation.   "   Dermatologic: Left plantar lateral heel with thick hyperkeratotic lesion.  Pain to pressure.  Skin is intact to both lower extremities without significant lesions, rash or abrasion.  No paronychia or evidence of soft tissue infection is noted.     Vascular: DP & PT pulses are intact & regular bilaterally.  No significant edema or varicosities noted.  CFT and skin temperature are normal to both lower extremities.     Neurologic: Lower extremity sensation is intact to light touch.  No evidence of weakness or contracture in the lower extremities.  No evidence of neuropathy.     Musculoskeletal: Metatarsus adductus noted with pronation b/l.  Mild left midfoot swelling, but no pain on exam.  Patient is ambulatory without assistive device or brace.  No gross ankle deformity noted.  No foot or ankle joint effusion is noted.    XRs of left foot reviewed with pt.  No convincing acute findings.  Mild degenerative changes along TMTJ area.  Metatarsus adductus.     ASSESSMENT:   L foot pain, pronation, early DJD, met adductus  L heel callus     PLAN:  Reviewed patient's chart in epic.  Discussed condition and treatment options including pros and cons.    Discussed foot pain.  Likely flatfoot related, early DJD.  Possible stress reaction.  Offered boot, MRI; pt declined.  Advised orthotics, stiff soled shoes.  Custom orthotics ordered.  PRICE advised.  Injection can be considered if not improving.    Discussed callus care.  They are due to areas of increased friction.  Discussed treatments such as using foot file, pumice stone, pads, orthotics, and not walking barefoot.  Pt requested debridement today.  I debrided the heel callus with a 15 blade.  No ulcer noted.    Handouts given.    F/u 1 month prn.    Wesly Ruby DPM, FACFAS

## 2017-07-18 NOTE — PATIENT INSTRUCTIONS
Follow up in 1 month as needed.  [facility] Locations    Monday Tuesday  [facility]  [address]         [id number]  [place] [address]      MICHAEL Singh [id number]  Ph:  [phone]      Ph: [phone]  Fax: [phone]      Fax: [phone]    Wednesday Thursday - Surgery Day  [facility]     Call Fiona @ [phone]  [address] [place] [address]        [place] [address]  Ph: [phone]  Fax: [phone]    Calluses, Corns, IPKs, Porokeratosis    When there is excessive friction or pressure on the skin, the body responds by making the skin thicker to protect the deeper structures from becoming exposed.  While this works well to protect the deeper structures, the thickened skin can increase pressure and pain.    Flat, diffuse thickening are simple calluses and they are usually caused by friction.  Often these are the result of rubbing on a shoe or going barefoot.    Calluses with a central core between the toes are called corns.  These result from prominent joints on adjacent toes rubbing together.  Theses are a symptom of bone malalignment and will always recur unless the underlying bones are addressed surgically.    Calluses with a central core on the ball of the foot are usually IPKs (intractable plantar keratosis).  These are caused by excessive pressure from the metatarsals, the bones that make up the ball of the foot.  Often one of these bones is too long or too prominent.  Again, these will always recur unless the underlying bone issue is addressed.  There is no cure for these.  They will either go away by themselves, recur, or more could develop.    Regardless of the diagnosis, most of these lesions can be kept comfortable with routine maintenance.   1.This consists of filing them with a pumice stone or callus file a couple of times per week.    2. Lotion can be applied to soften the callus. A urea  based cream such as Kersal or Vanicream or thicker cream with shea butter are good options.  3. Toe spacers or toe covers can be used for corns, gel pads can be used for other lesions on the bottom of the foot.   If there is a surgical pathology noted, such as a prominent bone, often this needs to be addressed surgically to minimize recurrence. However, sometimes the lesion simply migrates to another spot after surgery, so it is not a guaranteed cure.     Please call with any additional questions.     PRICE THERAPY    Many aches and pains throughout the foot and ankle can be helped with many simple treatments. This is usually described as PRICE Therapy.      P - Protection - often times, inflammation/pain in the lower extremity is not able to improve simply because the areas involved are never allowed to rest. Every step we take can bother the problematic area. Protecting those areas is an important step in the healing process. This may involve a walking cast boot, a special insert/orthotic device, an ankle brace, or simply avoiding barefoot walking.    R - Rest - in addition to protecting the foot/ankle, resting is an important, but often times difficult, treatment option. Getting off your feet when they bother you, and specifically avoiding activities that cause pain/discomfort, are very beneficial to prevent, and treat, foot/ankle pain.      I - Ice - icing regularly can help to decrease inflammation and swelling in the foot, thus decreasing pain. Using an ice pack or a bag of frozen veggies works very well. Ice for 20 minutes multiple times per day as needed.  Do not place the ice directly on the skin as this can cause tissue damage.    C - Compression - using a compression wrap or an ACE wrap can help to decrease swelling, which can help to decrease pain. Wearing the wraps is generally not needed at night, but they should be worn on a regular basis when you are going to be on your feet for prolonged periods as  gravity tends to pull fluids down to your feet/ankles.    E - Elevation - elevating your lower extremities multiple times daily for 15-20 minutes can help to decrease swelling, which works well in decreasing pain levels.    NSAID/Tylenol - Anti-inflammatories like Aleve or ibuprofen, and/or a pain medication, such as Tylenol, can help to improve pain levels and get the issue resolved sooner rather than later. Anyone with liver issues should be careful with Tylenol, and anyone with high blood pressure or heart, stomach or kidney issues should be careful with anti-inflammatories. Please ask if you have questions about these medications, including dosage.      OSTEOARTHRITIS OF THE FOOT & ANKLE  Osteoarthritis is a condition characterized by the breakdown and eventual loss of cartilage in one or more joints. Cartilage (the connective tissue found at the end of the bones in the joints) protects and cushions the bones during movement. When cartilage deteriorates or is lost, symptoms develop that can restrict one s ability to easily perform daily activities.  Osteoarthritis is also known as degenerative arthritis, reflecting its nature to develop as part of the aging process. As the most common form of arthritis, osteoarthritis affects millions of Americans. Some people refer to osteoarthritis simply as arthritis, even though there are many different types of arthritis.  Osteoarthritis appears at various joints throughout the body, including the hands, feet, spine, hips, and knees. In the foot, the disease most frequently occurs in the big toe, although it is also often found in the midfoot and ankle.  CAUSES  Osteoarthritis is considered a  wear and tear  disease because the cartilage in the joint wears down with repeated stress and use over time. As the cartilage deteriorates and gets thinner, the bones lose their protective covering and eventually may rub together, causing pain and inflammation of the joint.  An injury  may also lead to osteoarthritis, although it may take months or years after the injury for the condition to develop. For example, osteoarthritis in the big toe is often caused by kicking or jamming the toe, or by dropping something on the toe. Osteoarthritis in the midfoot is often caused by dropping something on it, or by a sprain or fracture. In the ankle, osteoarthritis is usually caused by a fracture and occasionally by a severe sprain.  Sometimes osteoarthritis develops as a result of abnormal foot mechanics such as flat feet or high arches. A flat foot causes less stability in the ligaments (bands of tissue that connect bones), resulting in excessive strain on the joints, which can cause arthritis. A high arch is rigid and lacks mobility, causing a jamming of joints that creates an increased risk of arthritis.  SYMPTOMS  People with osteoarthritis in the foot or ankle experience, in varying degrees, one or more of the following: Pain and stiffness in the joint, swelling in or near the joint, or difficulty walking or bending the joint.   Some patients with osteoarthritis also develop a bone spur (a bony protrusion) at the affected joint. Shoe pressure may cause pain at the site of a bone spur, and in some cases blisters or calluses may form over its surface. Bone spurs can also limit the movement of the joint.    DIAGNOSIS  In diagnosing osteoarthritis, the foot and ankle surgeon will examine the foot thoroughly, looking for swelling in the joint, limited mobility, and pain with movement. In some cases, deformity and/or enlargement (spur) of the joint may be noted. X-rays may be ordered to evaluate the extent of the disease.  NON-SURGICAL TREATMENT  To help relieve symptoms, the surgeon may begin treating osteoarthritis with one or more of the following non-surgical approaches:  Oral medications. Nonsteroidal anti-inflammatory drugs (NSAIDs), such as ibuprofen, are often helpful in reducing the inflammation and  pain. Occasionally a prescription for a steroid medication is needed to adequately reduce symptoms.   Orthotic devices. Custom orthotic devices (shoe inserts) are often prescribed to provide support to improve the foot s mechanics or cushioning to help minimize pain.   Bracing. Bracing, which restricts motion and supports the joint, can reduce pain during walking and help prevent further deformity.   Immobilization. Protecting the foot from movement by wearing a cast or removable cast-boot may be necessary to allow the inflammation to resolve.   Steroid injections. In some cases, steroid injections are applied to the affected joint to deliver anti-inflammatory medication.   Physical therapy. Exercises to strengthen the muscles, especially when the osteoarthritis occurs in the ankle, may give the patient greater stability and help avoid injury that might worsen the condition.   DO I NEED SURGERY?  When osteoarthritis has progressed substantially or failed to improve with non-surgical treatment, surgery may be recommended. In advanced cases, surgery may be the only option. The goal of surgery is to decrease pain and improve function. The foot and ankle surgeon will consider a number of factors when selecting the procedure best suited to the patient s condition and lifestyle.

## 2017-07-18 NOTE — MR AVS SNAPSHOT
After Visit Summary   7/18/2017    Dustin Avila    MRN: [id number]           Patient Information     Date Of Birth          1970        Visit Information        Provider Department      7/18/2017 7:40 AM Wesly Ruby DPM [facility]        Today's Diagnoses     Left foot pain    -  1    Callus        Pronation of both feet        Osteoarthritis of left foot, unspecified osteoarthritis type          Care Instructions    Follow up in 1 month as needed.  Dr. Ruby[facility] Locations    Monday Tuesday  [facility]  [address]         [id number]  [place] [address]      [place] [address]  Ph:  [phone]      Ph: [phone]  Fax: [phone]      Fax: [phone]    Wednesday Thursday - Surgery Day  [facility]     Call Fiona @ [phone]  [address] [place] [address]  Ph: [phone]  Fax: [phone]    Calluses, Corns, IPKs, Porokeratosis    When there is excessive friction or pressure on the skin, the body responds by making the skin thicker to protect the deeper structures from becoming exposed.  While this works well to protect the deeper structures, the thickened skin can increase pressure and pain.    Flat, diffuse thickening are simple calluses and they are usually caused by friction.  Often these are the result of rubbing on a shoe or going barefoot.    Calluses with a central core between the toes are called corns.  These result from prominent joints on adjacent toes rubbing together.  Theses are a symptom of bone malalignment and will always recur unless the underlying bones are addressed surgically.    Calluses with a central core on the ball of the foot are usually IPKs (intractable plantar keratosis).  These are caused by excessive pressure from the metatarsals, the bones that make up the ball of the foot.  Often one of these bones is too  long or too prominent.  Again, these will always recur unless the underlying bone issue is addressed.  There is no cure for these.  They will either go away by themselves, recur, or more could develop.    Regardless of the diagnosis, most of these lesions can be kept comfortable with routine maintenance.   1.This consists of filing them with a pumice stone or callus file a couple of times per week.    2. Lotion can be applied to soften the callus. A urea based cream such as Kersal or Vanicream or thicker cream with shea butter are good options.  3. Toe spacers or toe covers can be used for corns, gel pads can be used for other lesions on the bottom of the foot.   If there is a surgical pathology noted, such as a prominent bone, often this needs to be addressed surgically to minimize recurrence. However, sometimes the lesion simply migrates to another spot after surgery, so it is not a guaranteed cure.     Please call with any additional questions.     PRICE THERAPY    Many aches and pains throughout the foot and ankle can be helped with many simple treatments. This is usually described as PRICE Therapy.      P - Protection - often times, inflammation/pain in the lower extremity is not able to improve simply because the areas involved are never allowed to rest. Every step we take can bother the problematic area. Protecting those areas is an important step in the healing process. This may involve a walking cast boot, a special insert/orthotic device, an ankle brace, or simply avoiding barefoot walking.    R - Rest - in addition to protecting the foot/ankle, resting is an important, but often times difficult, treatment option. Getting off your feet when they bother you, and specifically avoiding activities that cause pain/discomfort, are very beneficial to prevent, and treat, foot/ankle pain.      I - Ice - icing regularly can help to decrease inflammation and swelling in the foot, thus decreasing pain. Using an ice  pack or a bag of frozen veggies works very well. Ice for 20 minutes multiple times per day as needed.  Do not place the ice directly on the skin as this can cause tissue damage.    C - Compression - using a compression wrap or an ACE wrap can help to decrease swelling, which can help to decrease pain. Wearing the wraps is generally not needed at night, but they should be worn on a regular basis when you are going to be on your feet for prolonged periods as gravity tends to pull fluids down to your feet/ankles.    E - Elevation - elevating your lower extremities multiple times daily for 15-20 minutes can help to decrease swelling, which works well in decreasing pain levels.    NSAID/Tylenol - Anti-inflammatories like Aleve or ibuprofen, and/or a pain medication, such as Tylenol, can help to improve pain levels and get the issue resolved sooner rather than later. Anyone with liver issues should be careful with Tylenol, and anyone with high blood pressure or heart, stomach or kidney issues should be careful with anti-inflammatories. Please ask if you have questions about these medications, including dosage.      OSTEOARTHRITIS OF THE FOOT & ANKLE  Osteoarthritis is a condition characterized by the breakdown and eventual loss of cartilage in one or more joints. Cartilage (the connective tissue found at the end of the bones in the joints) protects and cushions the bones during movement. When cartilage deteriorates or is lost, symptoms develop that can restrict one s ability to easily perform daily activities.  Osteoarthritis is also known as degenerative arthritis, reflecting its nature to develop as part of the aging process. As the most common form of arthritis, osteoarthritis affects millions of Americans. Some people refer to osteoarthritis simply as arthritis, even though there are many different types of arthritis.  Osteoarthritis appears at various joints throughout the body, including the hands, feet, spine,  hips, and knees. In the foot, the disease most frequently occurs in the big toe, although it is also often found in the midfoot and ankle.  CAUSES  Osteoarthritis is considered a  wear and tear  disease because the cartilage in the joint wears down with repeated stress and use over time. As the cartilage deteriorates and gets thinner, the bones lose their protective covering and eventually may rub together, causing pain and inflammation of the joint.  An injury may also lead to osteoarthritis, although it may take months or years after the injury for the condition to develop. For example, osteoarthritis in the big toe is often caused by kicking or jamming the toe, or by dropping something on the toe. Osteoarthritis in the midfoot is often caused by dropping something on it, or by a sprain or fracture. In the ankle, osteoarthritis is usually caused by a fracture and occasionally by a severe sprain.  Sometimes osteoarthritis develops as a result of abnormal foot mechanics such as flat feet or high arches. A flat foot causes less stability in the ligaments (bands of tissue that connect bones), resulting in excessive strain on the joints, which can cause arthritis. A high arch is rigid and lacks mobility, causing a jamming of joints that creates an increased risk of arthritis.  SYMPTOMS  People with osteoarthritis in the foot or ankle experience, in varying degrees, one or more of the following: Pain and stiffness in the joint, swelling in or near the joint, or difficulty walking or bending the joint.   Some patients with osteoarthritis also develop a bone spur (a bony protrusion) at the affected joint. Shoe pressure may cause pain at the site of a bone spur, and in some cases blisters or calluses may form over its surface. Bone spurs can also limit the movement of the joint.    DIAGNOSIS  In diagnosing osteoarthritis, the foot and ankle surgeon will examine the foot thoroughly, looking for swelling in the joint,  limited mobility, and pain with movement. In some cases, deformity and/or enlargement (spur) of the joint may be noted. X-rays may be ordered to evaluate the extent of the disease.  NON-SURGICAL TREATMENT  To help relieve symptoms, the surgeon may begin treating osteoarthritis with one or more of the following non-surgical approaches:  Oral medications. Nonsteroidal anti-inflammatory drugs (NSAIDs), such as ibuprofen, are often helpful in reducing the inflammation and pain. Occasionally a prescription for a steroid medication is needed to adequately reduce symptoms.   Orthotic devices. Custom orthotic devices (shoe inserts) are often prescribed to provide support to improve the foot s mechanics or cushioning to help minimize pain.   Bracing. Bracing, which restricts motion and supports the joint, can reduce pain during walking and help prevent further deformity.   Immobilization. Protecting the foot from movement by wearing a cast or removable cast-boot may be necessary to allow the inflammation to resolve.   Steroid injections. In some cases, steroid injections are applied to the affected joint to deliver anti-inflammatory medication.   Physical therapy. Exercises to strengthen the muscles, especially when the osteoarthritis occurs in the ankle, may give the patient greater stability and help avoid injury that might worsen the condition.   DO I NEED SURGERY?  When osteoarthritis has progressed substantially or failed to improve with non-surgical treatment, surgery may be recommended. In advanced cases, surgery may be the only option. The goal of surgery is to decrease pain and improve function. The foot and ankle surgeon will consider a number of factors when selecting the procedure best suited to the patient s condition and lifestyle.            Follow-ups after your visit        Additional Services     ORTHOTICS REFERRAL       Please be aware that coverage of these services is subject to the terms and limitations  of your health insurance plan.  Call member services at your health plan with any benefit or coverage questions.      Please bring the following to your appointment:    >>   Any x-rays, CTs or MRIs which have been performed.  Contact the facility where they were done to arrange for  prior to your scheduled appointment.  Any new CT, MRI or other procedures ordered by your specialist must be performed at a [facility] facility or coordinated by your clinic's referral office.    >>   List of current medications   >>   This referral request   >>   Any documents/labs given to you for this referral    ==This Referral PRINTS in the [facility] (ORTHOTICS & PROSTHETICS) Central scheduling office ==     The [facility] Central Scheduling staff will contact patient to arrange appointments. Central Scheduling Phone #:  [place]  [phone]     Orthotics: Foot Orthotics                  Who to contact     If you have questions or need follow up information about today's clinic visit or your schedule please contact [facility] directly at [phone].  Normal or non-critical lab and imaging results will be communicated to you by Pinticshart, letter or phone within 4 business days after the clinic has received the results. If you do not hear from us within 7 days, please contact the clinic through Pinticshart or phone. If you have a critical or abnormal lab result, we will notify you by phone as soon as possible.  Submit refill requests through LonoCloud or call your pharmacy and they will forward the refill request to us. Please allow 3 business days for your refill to be completed.          Additional Information About Your Visit        LonoCloud Information     LonoCloud gives you secure access to your electronic health record. If you see a primary care provider, you can also send messages to your care team and make appointments. If you have questions, please call your primary care clinic.  If you  "do not have a primary care provider, please call [phone] and they will assist you.        Care EveryWhere ID     This is your Care EveryWhere ID. This could be used by other organizations to access your [place] medical records  [id number]        Your Vitals Were     Pulse Height BMI (Body Mass Index)             69 5' 7\" (1.702 m) 31.11 kg/m2          Blood Pressure from Last 3 Encounters:   07/18/17 122/80   03/31/17 96/70   03/21/17 110/80    Weight from Last 3 Encounters:   07/18/17 198 lb 9.6 oz (90.1 kg)   03/31/17 193 lb (87.5 kg)   03/21/17 189 lb (85.7 kg)              We Performed the Following     ORTHOTICS REFERRAL     XR Foot Left G/E 3 Views          Today's Medication Changes          These changes are accurate as of: 7/18/17  8:30 AM.  If you have any questions, ask your nurse or doctor.               These medicines have changed or have updated prescriptions.        Dose/Directions    aspirin 81 MG EC tablet   This may have changed:    - when to take this  - additional instructions   Used for:  NSTEMI (non-ST elevated myocardial infarction) (H)        Dose:  81 mg   Take 1 tablet (81 mg) by mouth daily   Quantity:  90 tablet   Refills:  3                Primary Care Provider Office Phone # Fax #    Oscar Husain [phone]       [facility] [address]  [place] [address]        Equal Access to Services     AYAN PEPE AH: Hadii vonnie cro Somatthew, waaxda luqadaha, qaybta kaalmada maricarmen, darius figueroa adejosi oliva. So [facility] [phone].    ATENCIÓN: Si habla español, tiene a aldana disposición servicios gratuitos de asistencia lingüística. Llame al [phone].    We comply with applicable federal civil rights laws and [place] laws. We do not discriminate on the basis of race, color, national origin, age, disability sex, sexual orientation or gender identity.            Thank you!     Thank you for choosing [facility] " [facility]  for your care. Our goal is always to provide you with excellent care. Hearing back from our patients is one way we can continue to improve our services. Please take a few minutes to complete the written survey that you may receive in the mail after your visit with us. Thank you!             Your Updated Medication List - Protect others around you: Learn how to safely use, store and throw away your medicines at www.disposemymeds.org.          This list is accurate as of: 7/18/17  8:30 AM.  Always use your most recent med list.                   Brand Name Dispense Instructions for use Diagnosis    aspirin 81 MG EC tablet     90 tablet    Take 1 tablet (81 mg) by mouth daily    NSTEMI (non-ST elevated myocardial infarction) (H)       * atorvastatin 20 MG tablet    LIPITOR    30 tablet    TAKE 1 TABLET(20 MG) BY MOUTH DAILY    Hyperlipidemia LDL goal <70       * atorvastatin 20 MG tablet    LIPITOR    90 tablet    TAKE 1 TABLET(20 MG) BY MOUTH DAILY    NSTEMI (non-ST elevated myocardial infarction) (H), Hyperlipidemia LDL goal <70       clopidogrel 75 MG tablet    PLAVIX    90 tablet    Take 1 tablet (75 mg) by mouth daily    NSTEMI (non-ST elevated myocardial infarction) (H)       metoprolol 50 MG 24 hr tablet    TOPROL-XL    90 tablet    TAKE 1 TABLET(50 MG) BY MOUTH DAILY    NSTEMI (non-ST elevated myocardial infarction) (H)       nitroGLYcerin 0.4 MG sublingual tablet    NITROSTAT    25 tablet    Place 1 tablet (0.4 mg) under the tongue every 5 minutes as needed for chest pain if you are still having symptoms after 3 doses (15 minutes) call 911.    NSTEMI (non-ST elevated myocardial infarction) (H)       * Notice:  This list has 2 medication(s) that are the same as other medications prescribed for you. Read the directions carefully, and ask your doctor or other care provider to review them with you.

## 2017-07-18 NOTE — PROGRESS NOTES
Weight management plan: Patient was referred to their PCP to discuss a diet and exercise plan.     CHAI Hayden MA July 18, 2017 7:45 AM

## 2017-07-18 NOTE — NURSING NOTE
"Chief Complaint   Patient presents with     Foot Pain     L foot possible stress Fx, Callus on L heel. B/L Fallen arches and flat feet       Initial /80 (BP Location: Left arm, Patient Position: Chair, Cuff Size: Adult Large)  Pulse 69  Ht 5' 7\" (1.702 m)  Wt 198 lb 9.6 oz (90.1 kg)  BMI 31.11 kg/m2 Estimated body mass index is 31.11 kg/(m^2) as calculated from the following:    Height as of this encounter: 5' 7\" (1.702 m).    Weight as of this encounter: 198 lb 9.6 oz (90.1 kg).  Medication Reconciliation: kaia Hayden MA July 18, 2017 7:44 AM  "

## 2017-07-18 NOTE — LETTER
7/18/2017         RE: Dustin Avila  [address]  [place] [address]        Dear Colleague,    Thank you for referring your patient, Dustin Avila, to the [facility]. Please see a copy of my visit note below.    Weight management plan: Patient was referred to their PCP to discuss a diet and exercise plan.     CHAI Hayden MA July 18, 2017 7:45 AM      PATIENT HISTORY:  Dustin Avila is a 46 year old male who presents to clinic for left foot concerns.  Pt with longstanding painful heel callus.  He has had it trimmed in the past with some improvement.  Also with left intermittent midfoot pain with activity.  No injury reported.  Nondiabetic.  No treatments.      Review of Systems:  Patient denies fever, chills, rash, wound, numbness, weakness, heart burn, blood in stool, chest pain with activity, calf pain when walking, shortness of breath with activity, chronic cough, easy bleeding/bruising, swelling of ankles, excessive thirst, fatigue, depression, anxiety.  Patient admits to stiffness, limping.     PAST MEDICAL HISTORY:   Past Medical History:   Diagnosis Date     Achilles tendonitis      Ankle edema      Arthritis      Coronary artery disease     cardiac cath 2016:  + PIERRE to OM1, PIERRE to LAd, PIERRE to ramus     Hyperlipidemia      Incidental lung nodule      Osgood-Schlatter/osteochondroses     left     WPW (Joshua-Parkinson-White syndrome)         PAST SURGICAL HISTORY:   Past Surgical History:   Procedure Laterality Date     ARTHROSCOPY KNEE  5/8/2012    Procedure:ARTHROSCOPY KNEE; left knee arthroscopy, partial medial and lateral meniscectomy ; Surgeon:MARIA M THOMAS; Location:[facility] OR     HERNIA REPAIR       ORTHOPEDIC SURGERY      left leg achilles rupture     ORTHOPEDIC SURGERY      right hip replacemnent     SEPTOPLASTY          MEDICATIONS:   Current Outpatient Prescriptions:      clopidogrel (PLAVIX) 75 MG tablet, Take 1 tablet (75 mg) by mouth daily, Disp: 90 tablet, Rfl:  "3     atorvastatin (LIPITOR) 20 MG tablet, TAKE 1 TABLET(20 MG) BY MOUTH DAILY, Disp: 90 tablet, Rfl: 0     atorvastatin (LIPITOR) 20 MG tablet, TAKE 1 TABLET(20 MG) BY MOUTH DAILY, Disp: 30 tablet, Rfl: 3     metoprolol (TOPROL-XL) 50 MG 24 hr tablet, TAKE 1 TABLET(50 MG) BY MOUTH DAILY, Disp: 90 tablet, Rfl: 3     aspirin EC 81 MG EC tablet, Take 1 tablet (81 mg) by mouth daily (Patient taking differently: Take 81 mg by mouth Monday, Wednesday and Friday), Disp: 90 tablet, Rfl: 3     nitroglycerin (NITROSTAT) 0.4 MG SL tablet, Place 1 tablet (0.4 mg) under the tongue every 5 minutes as needed for chest pain if you are still having symptoms after 3 doses (15 minutes) call 911., Disp: 25 tablet, Rfl: 11     ALLERGIES:  No Known Allergies     SOCIAL HISTORY:   Social History     Social History     Marital status:      Spouse name: seperated     Number of children: 2     Years of education: N/A     Occupational History     self      [employer]      Social History Main Topics     Smoking status: Current Every Day Smoker     Packs/day: 0.50     Years: 35.00     Types: Cigarettes     Smokeless tobacco: Never Used     Alcohol use 0.0 oz/week     0 Standard drinks or equivalent per week      Comment: Rare      Drug use: Yes     Special: Marijuana     Sexual activity: Yes     Partners: Female     Other Topics Concern     Parent/Sibling W/ Cabg, Mi Or Angioplasty Before 65f 55m? No     Caffeine Concern No     Sleep Concern Yes     Stress Concern Yes     Weight Concern Yes     weight loss     Exercise Yes     cardiac rehab 3 days week     Social History Narrative        FAMILY HISTORY:   Family History   Problem Relation Age of Onset     DIABETES Father         EXAM:Vitals: /80 (BP Location: Left arm, Patient Position: Chair, Cuff Size: Adult Large)  Pulse 69  Ht 5' 7\" (1.702 m)  Wt 198 lb 9.6 oz (90.1 kg)  BMI 31.11 kg/m2  BMI= Body mass index is 31.11 kg/(m^2).    General appearance: Patient is alert and fully " cooperative with history & exam.  No sign of distress is noted during the visit.     Psychiatric: Affect is pleasant & appropriate.  Patient appears motivated to improve health.     Respiratory: Breathing is regular & unlabored while sitting.     HEENT: Hearing is intact to spoken word.  Speech is clear.  No gross evidence of visual impairment that would impact ambulation.     Dermatologic: Left plantar lateral heel with thick hyperkeratotic lesion.  Pain to pressure.  Skin is intact to both lower extremities without significant lesions, rash or abrasion.  No paronychia or evidence of soft tissue infection is noted.     Vascular: DP & PT pulses are intact & regular bilaterally.  No significant edema or varicosities noted.  CFT and skin temperature are normal to both lower extremities.     Neurologic: Lower extremity sensation is intact to light touch.  No evidence of weakness or contracture in the lower extremities.  No evidence of neuropathy.     Musculoskeletal: Metatarsus adductus noted with pronation b/l.  Mild left midfoot swelling, but no pain on exam.  Patient is ambulatory without assistive device or brace.  No gross ankle deformity noted.  No foot or ankle joint effusion is noted.    XRs of left foot reviewed with pt.  No convincing acute findings.  Mild degenerative changes along TMTJ area.  Metatarsus adductus.     ASSESSMENT:   L foot pain, pronation, early DJD, met adductus  L heel callus     PLAN:  Reviewed patient's chart in epic.  Discussed condition and treatment options including pros and cons.    Discussed foot pain.  Likely flatfoot related, early DJD.  Possible stress reaction.  Offered boot, MRI; pt declined.  Advised orthotics, stiff soled shoes.  Custom orthotics ordered.  PRICE advised.  Injection can be considered if not improving.    Discussed callus care.  They are due to areas of increased friction.  Discussed treatments such as using foot file, pumice stone, pads, orthotics, and not  walking barefoot.  Pt requested debridement today.  I debrided the heel callus with a 15 blade.  No ulcer noted.    Handouts given.    F/u 1 month prn.    Wesly Ruby DPM, FACFAS      Again, thank you for allowing me to participate in the care of your patient.        Sincerely,        Wesly Ruby DPM

## 2017-07-21 ENCOUNTER — TELEPHONE (OUTPATIENT)
Dept: CARDIOLOGY | Facility: CLINIC | Age: 47
End: 2017-07-21

## 2017-07-21 NOTE — TELEPHONE ENCOUNTER
Patient called 7-20-17 to inform us that 7-21-17 he is scheduled for a ortho MRI  And asking what kind of stenting he had in 2016 and if he can have an MRI.  2016 3 PIERRE stents.   1. OM1 Lesion:  A 6Fr XB3.5 guide catheter was positioned at the ostium of the LM.  Angiomax bolus and continuous infusion was administered. We first  wanted to test this lesion to determine whether it was a  vs an  acute occlusion. We were unable to cross using a BMW wire, although we  pierced the proximal cap with relative ease. We then escalated to a   50 and then a  150 wire which successfully crossed the  lesion. We dilated the lesion using a 1.2x12mm Emerge balloon and then  a 2.0x15mm Emerge balloon. We successfully deployed a 2.23c07vd Promus  Premier PIERRE, and post dilated the proximal portion with a 2.5x8mm NC  Emerge balloon. Final angiography showed no evidence of perforation or  dissection with residual stenosis of 0% and DARIA 3 flow. The distal  circ has a 90% focal ostial lesion after the bifurcation, but still  retained DARIA 3 flow. No complications.  2. LAD Lesion:  A BMWwire was then advanced across the mid LAD lesion and positioned  in the distal LAD.  A 3.0x8mm balloon was used to pre-dilate the  lesion.  A 4.0x12mm Promus Premier drug eluting stent was successfully  deployed across the lesion with inflation to 14 megan. The stent was  post-dilated with a 4.0x6mm NC Euphora balloon.  Final angiography  showed no evidence of perforation or dissection with residual stenosis  of 0% and DARIA 3 flow. No complications.  1. Ramus Lesion:  The BMW wire was then advanced across the Ramus lesion. A 3.0x8mm  balloon was used to pre-dilate the lesion.  A 3.0x20mm Promus Premier  drug eluting stent was successfully deployed across the lesion with  inflation to 14 megan. Final angiography showed no evidence of  perforation or dissection with residual stenosis of  0% and DARIA 3  flow. No complications.  SUMMARY:   1. NSTEMI due  to severe stenosis with possible ruptured plaque in the  LAD and ramus (culprit vessels)  2.  of the OM1 and distal small caliber rPL2.    3. Successful  Percutaneous coronary intervention of the OM1  4. Successful Percutaneous coronary intervention of the mid LAD  5. Successful Percutaneous coronary intervention of the Ramus  Intermedius.  Attempted to return call, message left to call back.

## 2017-07-24 NOTE — TELEPHONE ENCOUNTER
Attempted to return call on 7-20-17 and 7-24-17   Message left that Stents are compatible with MRI.  Patient to call back with any questions or concerns.

## 2017-08-16 ENCOUNTER — DOCUMENTATION ONLY (OUTPATIENT)
Dept: CARDIOLOGY | Facility: CLINIC | Age: 47
End: 2017-08-16

## 2017-08-16 DIAGNOSIS — I25.10 CORONARY ARTERY DISEASE INVOLVING NATIVE CORONARY ARTERY OF NATIVE HEART WITHOUT ANGINA PECTORIS: Primary | ICD-10-CM

## 2017-08-16 NOTE — LETTER
August 16, 2017       TO: Dustin Avila  [address] [address]       Dear Dustin Avila,    We are reviewing our outstanding orders.    Dr. Kinney has ordered an office visit and lab work for 6 month follow up. You saw Dr. Mendoza in March for cardiac clearance. The November schedule will open the week after Labor Day.      Please contact the scheduling desk at [phone] to arrange for an appointment.     If you have any questions, please call the Team 2 nurse phone @ [phone]. If you had your lab work done at another facility outside of the [facility] system, please give us a call so we can locate the results.     Thank you  Team 2 nurses

## 2017-09-13 DIAGNOSIS — E78.5 HYPERLIPIDEMIA LDL GOAL <70: ICD-10-CM

## 2017-09-13 DIAGNOSIS — I21.4 NSTEMI (NON-ST ELEVATED MYOCARDIAL INFARCTION) (H): ICD-10-CM

## 2017-09-13 RX ORDER — ATORVASTATIN CALCIUM 20 MG/1
TABLET, FILM COATED ORAL
Qty: 30 TABLET | Refills: 0 | Status: SHIPPED | OUTPATIENT
Start: 2017-09-13 | End: 2018-01-27

## 2017-09-13 NOTE — TELEPHONE ENCOUNTER
Needs fasting office visit- one month given- routing to team to call and schedule fasting office visit    Jania Weber RN  [facility]  [phone]

## 2017-09-13 NOTE — TELEPHONE ENCOUNTER
30 day supply given.  Patient is due for an OV.  Please call and assist with scheduling appointment prior to next refill   Toyin Tomas RN - Triage  [facility]

## 2017-09-13 NOTE — TELEPHONE ENCOUNTER
Atorvastatin     Last Written Prescription Date: 5/8/17  Last Fill Quantity: 90, # refills: 0  Last Office Visit with [facility], [facility] or [facility] prescribing provider: 3/31/17       Lab Results   Component Value Date    CHOL 122 07/27/2016     Lab Results   Component Value Date    HDL 46 07/27/2016     Lab Results   Component Value Date    LDL 61 07/27/2016     Lab Results   Component Value Date    TRIG 74 07/27/2016     No results found for: RC Cummings CMA

## 2017-09-13 NOTE — LETTER
September 25, 2017      Dustin Avila  [address] [address]        Dear Dustin,     Your atorvastatin (LIPITOR) 20 MG tablet was refilled for 30 days  You are due to be seen in clinic by your Provider prior to your next refill   Please contact the clinic and allow enough time to schedule prior to your next refill need.  [phone]      Mary  for :  Dr. Senia Cohen

## 2018-02-09 DIAGNOSIS — I21.4 NSTEMI (NON-ST ELEVATED MYOCARDIAL INFARCTION) (H): Chronic | ICD-10-CM

## 2018-02-09 RX ORDER — METOPROLOL SUCCINATE 50 MG/1
TABLET, EXTENDED RELEASE ORAL
Qty: 30 TABLET | Refills: 0 | Status: SHIPPED | OUTPATIENT
Start: 2018-02-09 | End: 2018-04-13

## 2018-02-09 NOTE — TELEPHONE ENCOUNTER
Routing refill request to provider for review/approval because:  A break in medication    Last RX was 10/20/16, 90 with 3 refills.    Veena Francisco, RN  [facility]

## 2018-02-09 NOTE — TELEPHONE ENCOUNTER
"Requested Prescriptions   Pending Prescriptions Disp Refills     metoprolol succinate (TOPROL-XL) 50 MG 24 hr tablet  Last Written Prescription Date:  10/20/16  Last Fill Quantity: 90,  # refills: 3   Last office visit: 3/31/2017 with prescribing provider:  Rodrigue   Future Office Visit:     90 tablet 3    Beta-Blockers Protocol Passed    2/9/2018  2:03 PM       Passed - Blood pressure under 140/90    BP Readings from Last 3 Encounters:   07/18/17 122/80   03/31/17 96/70   03/21/17 110/80                Passed - Patient is age 6 or older       Passed - Recent or future visit with authorizing provider's specialty    Patient had office visit in the last year or has a visit in the next 30 days with authorizing provider.  See \"Patient Info\" tab in inbasket, or \"Choose Columns\" in Meds & Orders section of the refill encounter.               "

## 2018-04-13 ENCOUNTER — OFFICE VISIT (OUTPATIENT)
Dept: FAMILY MEDICINE | Facility: CLINIC | Age: 48
End: 2018-04-13
Payer: COMMERCIAL

## 2018-04-13 VITALS
HEART RATE: 76 BPM | WEIGHT: 210 LBS | BODY MASS INDEX: 32.89 KG/M2 | DIASTOLIC BLOOD PRESSURE: 74 MMHG | SYSTOLIC BLOOD PRESSURE: 112 MMHG | TEMPERATURE: 98.7 F

## 2018-04-13 DIAGNOSIS — I25.10 CORONARY ARTERY DISEASE WITHOUT ANGINA PECTORIS, UNSPECIFIED VESSEL OR LESION TYPE, UNSPECIFIED WHETHER NATIVE OR TRANSPLANTED HEART: ICD-10-CM

## 2018-04-13 DIAGNOSIS — I21.4 NSTEMI (NON-ST ELEVATED MYOCARDIAL INFARCTION) (H): ICD-10-CM

## 2018-04-13 DIAGNOSIS — E78.5 HYPERLIPIDEMIA LDL GOAL <70: Primary | ICD-10-CM

## 2018-04-13 DIAGNOSIS — I25.2 OLD MYOCARDIAL INFARCTION: ICD-10-CM

## 2018-04-13 PROBLEM — R07.89 ATYPICAL CHEST PAIN: Status: RESOLVED | Noted: 2017-01-18 | Resolved: 2018-04-13

## 2018-04-13 PROCEDURE — 80053 COMPREHEN METABOLIC PANEL: CPT | Performed by: FAMILY MEDICINE

## 2018-04-13 PROCEDURE — 80061 LIPID PANEL: CPT | Performed by: FAMILY MEDICINE

## 2018-04-13 PROCEDURE — 99214 OFFICE O/P EST MOD 30 MIN: CPT | Performed by: FAMILY MEDICINE

## 2018-04-13 PROCEDURE — 36415 COLL VENOUS BLD VENIPUNCTURE: CPT | Performed by: FAMILY MEDICINE

## 2018-04-13 RX ORDER — METOPROLOL SUCCINATE 50 MG/1
TABLET, EXTENDED RELEASE ORAL
Qty: 90 TABLET | Refills: 3 | Status: SHIPPED | OUTPATIENT
Start: 2018-04-13 | End: 2019-09-09

## 2018-04-13 RX ORDER — ATORVASTATIN CALCIUM 20 MG/1
20 TABLET, FILM COATED ORAL DAILY
Qty: 90 TABLET | Refills: 3 | Status: SHIPPED | OUTPATIENT
Start: 2018-04-13 | End: 2019-09-09

## 2018-04-13 RX ORDER — NITROGLYCERIN 0.4 MG/1
0.4 TABLET SUBLINGUAL EVERY 5 MIN PRN
Qty: 25 TABLET | Refills: 11 | Status: SHIPPED | OUTPATIENT
Start: 2018-04-13 | End: 2019-09-09

## 2018-04-13 NOTE — PROGRESS NOTES
SUBJECTIVE:   Dustin Avila is a 47 year old male who presents to clinic today for the following health issues:      Medication Followup of Nitro    Taking Medication as prescribed: yes    Side Effects:  None    Medication Helping Symptoms:  yes     Patient has history of coronary artery disease along with non-ST segment elevation MI in the past.  He had intervention done and he was started on Plavix aspirin metoprolol and cholesterol medication.  Patient continued his Plavix until couple of months ago and then he stopped it.  He denies any chest pain currently but he has 1 hospitalization secondary to chest discomfort.  Which did not indicate any acute changes.    Patient also stopped his Lipitor recently.  Problem list and histories reviewed & adjusted, as indicated.      Patient Active Problem List   Diagnosis     Hyperlipidemia LDL goal <70     Anomalous atrioventricular excitation     WPW (Joshua-Parkinson-White syndrome)     Coronary artery disease involving native coronary artery of native heart without angina pectoris     Hyperlipidemia     JOHN (generalized anxiety disorder)     Anxiety     Old myocardial infarction     Past Surgical History:   Procedure Laterality Date     ARTHROSCOPY KNEE  5/8/2012    Procedure:ARTHROSCOPY KNEE; left knee arthroscopy, partial medial and lateral meniscectomy ; Surgeon:MARIA M THOMAS; Location:[facility] OR     HERNIA REPAIR       ORTHOPEDIC SURGERY      left leg achilles rupture     ORTHOPEDIC SURGERY      right hip replacemnent     SEPTOPLASTY         Social History   Substance Use Topics     Smoking status: Current Every Day Smoker     Packs/day: 0.25     Years: 35.00     Types: Cigarettes     Smokeless tobacco: Never Used     Alcohol use 0.0 oz/week     0 Standard drinks or equivalent per week      Comment: once weekly     Family History   Problem Relation Age of Onset     DIABETES Father          Current Outpatient Prescriptions   Medication Sig Dispense Refill      nitroGLYcerin (NITROSTAT) 0.4 MG sublingual tablet Place 1 tablet (0.4 mg) under the tongue every 5 minutes as needed for chest pain if you are still having symptoms after 3 doses (15 minutes) call 911. 25 tablet 11     atorvastatin (LIPITOR) 20 MG tablet Take 1 tablet (20 mg) by mouth daily 90 tablet 3     metoprolol succinate (TOPROL-XL) 50 MG 24 hr tablet TAKE 1 TABLET(50 MG) BY MOUTH DAILY 90 tablet 3     aspirin EC 81 MG EC tablet Take 1 tablet (81 mg) by mouth daily 90 tablet 3     [DISCONTINUED] metoprolol succinate (TOPROL-XL) 50 MG 24 hr tablet TAKE 1 TABLET(50 MG) BY MOUTH DAILY 30 tablet 0     [DISCONTINUED] atorvastatin (LIPITOR) 20 MG tablet TAKE 1 TABLET BY MOUTH DAILY (Patient not taking: Reported on 4/13/2018) 10 tablet 0     clopidogrel (PLAVIX) 75 MG tablet Take 1 tablet (75 mg) by mouth daily (Patient not taking: Reported on 4/13/2018) 90 tablet 3     atorvastatin (LIPITOR) 20 MG tablet TAKE 1 TABLET(20 MG) BY MOUTH DAILY (Patient not taking: Reported on 4/13/2018) 30 tablet 3     [DISCONTINUED] nitroglycerin (NITROSTAT) 0.4 MG SL tablet Place 1 tablet (0.4 mg) under the tongue every 5 minutes as needed for chest pain if you are still having symptoms after 3 doses (15 minutes) call 911. 25 tablet 11       Reviewed and updated as needed this visit by clinical staff  Allergies  Meds       Reviewed and updated as needed this visit by Provider         ROS:  CONSTITUTIONAL: NEGATIVE for fever, chills, change in weight  ENT/MOUTH: NEGATIVE for ear, mouth and throat problems  RESP: NEGATIVE for significant cough or SOB  CV: NEGATIVE for chest pain, palpitations or peripheral edema    OBJECTIVE:                                                    /74  Pulse 76  Temp 98.7  F (37.1  C) (Tympanic)  Wt 210 lb (95.3 kg)  BMI 32.89 kg/m2  Body mass index is 32.89 kg/(m^2).   GENERAL: healthy, alert, well nourished, well hydrated, no distress  HENT: ear canals- normal; TMs- normal; Nose- normal;  Mouth- no ulcers, no lesions  NECK: no tenderness, no adenopathy, no asymmetry, no masses, no stiffness; thyroid- normal to palpation  RESP: lungs clear to auscultation - no rales, no rhonchi, no wheezes  CV: regular rates and rhythm, normal S1 S2, no S3 or S4 and no murmur, no click or rub -       ASSESSMENT/PLAN:                                                        ICD-10-CM    1. Hyperlipidemia LDL goal <70 E78.5 Lipid panel reflex to direct LDL Fasting     Comprehensive metabolic panel     atorvastatin (LIPITOR) 20 MG tablet   2. Old myocardial infarction I25.2 nitroGLYcerin (NITROSTAT) 0.4 MG sublingual tablet   3. Coronary artery disease without angina pectoris, unspecified vessel or lesion type, unspecified whether native or transplanted heart I25.10    4. NSTEMI (non-ST elevated myocardial infarction) (H) I21.4 atorvastatin (LIPITOR) 20 MG tablet     metoprolol succinate (TOPROL-XL) 50 MG 24 hr tablet     Discussed with the patient about appropriate follow-up with the cardiology.  I have refilled his nitro to be used as needed.  Warning signs were discussed with the patient.  I will also get some blood work including cholesterol and liver kidney function.  I have refilled his cholesterol medication.  He is advised to continue taking that medication he may need to take it for long time probably lifelong.  His blood pressure is well controlled given his coronary artery disease beta-blocker would be long-term medication as well.  It is also refilled.  I suggested patient he should follow-up with cardiology once a year for thorough evaluation.    Oscar Husain MD  [facility]

## 2018-04-13 NOTE — MR AVS SNAPSHOT
After Visit Summary   4/13/2018    Dustin Avila    MRN: [id number]           Patient Information     Date Of Birth          1970        Visit Information        Provider Department      4/13/2018 11:20 AM Oscar Husain MD [facility] [place]        Today's Diagnoses     Hyperlipidemia LDL goal <70    -  1    Old myocardial infarction        Coronary artery disease without angina pectoris, unspecified vessel or lesion type, unspecified whether native or transplanted heart        NSTEMI (non-ST elevated myocardial infarction) (H)           Follow-ups after your visit        Follow-up notes from your care team     Return in about 3 months (around 7/13/2018) for Physical Exam.      Who to contact     If you have questions or need follow up information about today's clinic visit or your schedule please contact [facility] [place] directly at [phone].  Normal or non-critical lab and imaging results will be communicated to you by [employer], letter or phone within 4 business days after the clinic has received the results. If you do not hear from us within 7 days, please contact the clinic through [employer] or phone. If you have a critical or abnormal lab result, we will notify you by phone as soon as possible.  Submit refill requests through [employer] or call your pharmacy and they will forward the refill request to us. Please allow 3 business days for your refill to be completed.          Additional Information About Your Visit        MyChart Information     [employer] gives you secure access to your electronic health record. If you see a primary care provider, you can also send messages to your care team and make appointments. If you have questions, please call your primary care clinic.  If you do not have a primary care provider, please call [phone] and they will assist you.        Care EveryWhere ID     This is your Care EveryWhere ID. This could be used by other organizations to  access your [facility] medical records  [id number]        Your Vitals Were     Pulse Temperature BMI (Body Mass Index)             76 98.7  F (37.1  C) (Tympanic) 32.89 kg/m2          Blood Pressure from Last 3 Encounters:   04/13/18 112/74   07/18/17 122/80   03/31/17 96/70    Weight from Last 3 Encounters:   04/13/18 210 lb (95.3 kg)   07/18/17 198 lb 9.6 oz (90.1 kg)   03/31/17 193 lb (87.5 kg)              We Performed the Following     Comprehensive metabolic panel     Lipid panel reflex to direct LDL Fasting          Today's Medication Changes          These changes are accurate as of 4/13/18 11:48 AM.  If you have any questions, ask your nurse or doctor.               These medicines have changed or have updated prescriptions.        Dose/Directions    * atorvastatin 20 MG tablet   Commonly known as:  LIPITOR   This may have changed:  Another medication with the same name was changed. Make sure you understand how and when to take each.   Used for:  Hyperlipidemia LDL goal <70   Changed by:  Oscar Husain MD        TAKE 1 TABLET(20 MG) BY MOUTH DAILY   Quantity:  30 tablet   Refills:  3       * atorvastatin 20 MG tablet   Commonly known as:  LIPITOR   This may have changed:  See the new instructions.   Used for:  NSTEMI (non-ST elevated myocardial infarction) (H), Hyperlipidemia LDL goal <70   Changed by:  Oscar Husain MD        Dose:  20 mg   Take 1 tablet (20 mg) by mouth daily   Quantity:  90 tablet   Refills:  3       * Notice:  This list has 2 medication(s) that are the same as other medications prescribed for you. Read the directions carefully, and ask your doctor or other care provider to review them with you.         Where to get your medicines      These medications were sent to [facility]/pharmacy #[id number] - [place], [place] - [address]  [address] [place] [address]     Phone:  [phone]     atorvastatin 20 MG tablet    metoprolol succinate 50 MG 24 hr tablet         Some of these  will need a paper prescription and others can be bought over the counter.  Ask your nurse if you have questions.     Bring a paper prescription for each of these medications     nitroGLYcerin 0.4 MG sublingual tablet                Primary Care Provider Office Phone # Fax #    Oscar Husain [phone]       [address]  [place] [address]        Equal Access to Services     [facility]: Hadii aad ku hadasho Soomaali, waaxda luqadaha, qaybta kaalmada adeegyada, waxay idiin hayaan adeeg kharash la'aan ah. So [facility] [phone].    ATENCIÓN: Si habla español, tiene a aldana disposición servicios gratuitos de asistencia lingüística. Llame al [phone].    We comply with applicable federal civil rights laws and [place] laws. We do not discriminate on the basis of race, color, national origin, age, disability, sex, sexual orientation, or gender identity.            Thank you!     Thank you for choosing [facility] [place]  for your care. Our goal is always to provide you with excellent care. Hearing back from our patients is one way we can continue to improve our services. Please take a few minutes to complete the written survey that you may receive in the mail after your visit with us. Thank you!             Your Updated Medication List - Protect others around you: Learn how to safely use, store and throw away your medicines at www.disposemymeds.org.          This list is accurate as of 4/13/18 11:48 AM.  Always use your most recent med list.                   Brand Name Dispense Instructions for use Diagnosis    aspirin 81 MG EC tablet     90 tablet    Take 1 tablet (81 mg) by mouth daily    NSTEMI (non-ST elevated myocardial infarction) (H)       * atorvastatin 20 MG tablet    LIPITOR    30 tablet    TAKE 1 TABLET(20 MG) BY MOUTH DAILY    Hyperlipidemia LDL goal <70       * atorvastatin 20 MG tablet    LIPITOR    90 tablet    Take 1 tablet (20 mg) by mouth daily    NSTEMI (non-ST  elevated myocardial infarction) (H), Hyperlipidemia LDL goal <70       clopidogrel 75 MG tablet    PLAVIX    90 tablet    Take 1 tablet (75 mg) by mouth daily    NSTEMI (non-ST elevated myocardial infarction) (H)       metoprolol succinate 50 MG 24 hr tablet    TOPROL-XL    90 tablet    TAKE 1 TABLET(50 MG) BY MOUTH DAILY    NSTEMI (non-ST elevated myocardial infarction) (H)       nitroGLYcerin 0.4 MG sublingual tablet    NITROSTAT    25 tablet    Place 1 tablet (0.4 mg) under the tongue every 5 minutes as needed for chest pain if you are still having symptoms after 3 doses (15 minutes) call 911.    Old myocardial infarction       * Notice:  This list has 2 medication(s) that are the same as other medications prescribed for you. Read the directions carefully, and ask your doctor or other care provider to review them with you.

## 2018-04-14 LAB
ALBUMIN SERPL-MCNC: 3.9 G/DL (ref 3.4–5)
ALP SERPL-CCNC: 68 U/L (ref 40–150)
ALT SERPL W P-5'-P-CCNC: 30 U/L (ref 0–70)
ANION GAP SERPL CALCULATED.3IONS-SCNC: 6 MMOL/L (ref 3–14)
AST SERPL W P-5'-P-CCNC: 16 U/L (ref 0–45)
BILIRUB SERPL-MCNC: 0.3 MG/DL (ref 0.2–1.3)
BUN SERPL-MCNC: 10 MG/DL (ref 7–30)
CALCIUM SERPL-MCNC: 8.7 MG/DL (ref 8.5–10.1)
CHLORIDE SERPL-SCNC: 110 MMOL/L (ref 94–109)
CHOLEST SERPL-MCNC: 180 MG/DL
CO2 SERPL-SCNC: 24 MMOL/L (ref 20–32)
CREAT SERPL-MCNC: 0.79 MG/DL (ref 0.66–1.25)
GFR SERPL CREATININE-BSD FRML MDRD: >90 ML/MIN/1.7M2
GLUCOSE SERPL-MCNC: 93 MG/DL (ref 70–99)
HDLC SERPL-MCNC: 39 MG/DL
LDLC SERPL CALC-MCNC: 123 MG/DL
NONHDLC SERPL-MCNC: 141 MG/DL
POTASSIUM SERPL-SCNC: 4.3 MMOL/L (ref 3.4–5.3)
PROT SERPL-MCNC: 6.8 G/DL (ref 6.8–8.8)
SODIUM SERPL-SCNC: 140 MMOL/L (ref 133–144)
TRIGL SERPL-MCNC: 90 MG/DL

## 2018-04-22 ENCOUNTER — TRANSFERRED RECORDS (OUTPATIENT)
Dept: HEALTH INFORMATION MANAGEMENT | Facility: CLINIC | Age: 48
End: 2018-04-22

## 2018-08-23 ENCOUNTER — DOCUMENTATION ONLY (OUTPATIENT)
Dept: CARDIOLOGY | Facility: CLINIC | Age: 48
End: 2018-08-23

## 2018-08-23 NOTE — LETTER
August 23, 2018       TO: Dustin Avila  [address]  [place] [address]       Dear Dustin Avila,    We are reviewing our outstanding orders.    Dr. Mendoza has ordered an office visit with Dr. Kinney for your annual cardiac follow up. Your last visit was in March, 2017. Dr. Kinney has some openings on September 26, other than that new date he is full until December.     Please contact the scheduling desk at [phone] to arrange for an appointment.     If you have any questions, please call the Team 2 nurse phone @ [phone]. If you had your lab work done at another facility outside of the [facility] system, please give us a call so we can locate the results.     Thank you  Team 2 nurses

## 2019-01-06 ENCOUNTER — TRANSFERRED RECORDS (OUTPATIENT)
Dept: HEALTH INFORMATION MANAGEMENT | Facility: CLINIC | Age: 49
End: 2019-01-06

## 2019-05-09 ENCOUNTER — TRANSFERRED RECORDS (OUTPATIENT)
Dept: HEALTH INFORMATION MANAGEMENT | Facility: CLINIC | Age: 49
End: 2019-05-09

## 2019-07-19 ENCOUNTER — HOSPITAL ENCOUNTER (INPATIENT)
Facility: CLINIC | Age: 49
Setting detail: SURGERY ADMIT
End: 2019-07-19
Attending: ORTHOPAEDIC SURGERY | Admitting: ORTHOPAEDIC SURGERY
Payer: COMMERCIAL

## 2019-09-09 ENCOUNTER — OFFICE VISIT (OUTPATIENT)
Dept: FAMILY MEDICINE | Facility: CLINIC | Age: 49
End: 2019-09-09
Payer: COMMERCIAL

## 2019-09-09 VITALS
OXYGEN SATURATION: 95 % | RESPIRATION RATE: 14 BRPM | TEMPERATURE: 98.1 F | SYSTOLIC BLOOD PRESSURE: 118 MMHG | DIASTOLIC BLOOD PRESSURE: 74 MMHG | WEIGHT: 188 LBS | BODY MASS INDEX: 28.49 KG/M2 | HEIGHT: 68 IN | HEART RATE: 86 BPM

## 2019-09-09 DIAGNOSIS — Z01.818 PREOP GENERAL PHYSICAL EXAM: Primary | ICD-10-CM

## 2019-09-09 DIAGNOSIS — E78.00 PURE HYPERCHOLESTEROLEMIA: ICD-10-CM

## 2019-09-09 DIAGNOSIS — I45.6 WPW (WOLFF-PARKINSON-WHITE SYNDROME): ICD-10-CM

## 2019-09-09 DIAGNOSIS — Z72.0 TOBACCO USE: ICD-10-CM

## 2019-09-09 DIAGNOSIS — J01.90 ACUTE SINUSITIS WITH SYMPTOMS > 10 DAYS: ICD-10-CM

## 2019-09-09 DIAGNOSIS — I25.2 OLD MYOCARDIAL INFARCTION: ICD-10-CM

## 2019-09-09 DIAGNOSIS — I25.10 CORONARY ARTERY DISEASE INVOLVING NATIVE CORONARY ARTERY OF NATIVE HEART WITHOUT ANGINA PECTORIS: Chronic | ICD-10-CM

## 2019-09-09 LAB
ALBUMIN SERPL-MCNC: 3.9 G/DL (ref 3.4–5)
ALP SERPL-CCNC: 76 U/L (ref 40–150)
ALT SERPL W P-5'-P-CCNC: 32 U/L (ref 0–70)
ANION GAP SERPL CALCULATED.3IONS-SCNC: 8 MMOL/L (ref 3–14)
AST SERPL W P-5'-P-CCNC: 18 U/L (ref 0–45)
BASOPHILS # BLD AUTO: 0 10E9/L (ref 0–0.2)
BASOPHILS NFR BLD AUTO: 0.4 %
BILIRUB SERPL-MCNC: 0.3 MG/DL (ref 0.2–1.3)
BUN SERPL-MCNC: 10 MG/DL (ref 7–30)
CALCIUM SERPL-MCNC: 9.1 MG/DL (ref 8.5–10.1)
CHLORIDE SERPL-SCNC: 109 MMOL/L (ref 94–109)
CHOLEST SERPL-MCNC: 160 MG/DL
CO2 SERPL-SCNC: 22 MMOL/L (ref 20–32)
CREAT SERPL-MCNC: 0.69 MG/DL (ref 0.66–1.25)
DIFFERENTIAL METHOD BLD: NORMAL
EOSINOPHIL # BLD AUTO: 0.4 10E9/L (ref 0–0.7)
EOSINOPHIL NFR BLD AUTO: 4.5 %
ERYTHROCYTE [DISTWIDTH] IN BLOOD BY AUTOMATED COUNT: 14.6 % (ref 10–15)
GFR SERPL CREATININE-BSD FRML MDRD: >90 ML/MIN/{1.73_M2}
GLUCOSE SERPL-MCNC: 96 MG/DL (ref 70–99)
HCT VFR BLD AUTO: 44.4 % (ref 40–53)
HDLC SERPL-MCNC: 39 MG/DL
HGB BLD-MCNC: 14.8 G/DL (ref 13.3–17.7)
LDLC SERPL CALC-MCNC: 110 MG/DL
LYMPHOCYTES # BLD AUTO: 2.7 10E9/L (ref 0.8–5.3)
LYMPHOCYTES NFR BLD AUTO: 30.1 %
MCH RBC QN AUTO: 29.9 PG (ref 26.5–33)
MCHC RBC AUTO-ENTMCNC: 33.3 G/DL (ref 31.5–36.5)
MCV RBC AUTO: 90 FL (ref 78–100)
MONOCYTES # BLD AUTO: 0.7 10E9/L (ref 0–1.3)
MONOCYTES NFR BLD AUTO: 7.5 %
NEUTROPHILS # BLD AUTO: 5.1 10E9/L (ref 1.6–8.3)
NEUTROPHILS NFR BLD AUTO: 57.5 %
NONHDLC SERPL-MCNC: 121 MG/DL
PLATELET # BLD AUTO: 308 10E9/L (ref 150–450)
POTASSIUM SERPL-SCNC: 4.3 MMOL/L (ref 3.4–5.3)
PROT SERPL-MCNC: 7.1 G/DL (ref 6.8–8.8)
RBC # BLD AUTO: 4.95 10E12/L (ref 4.4–5.9)
SODIUM SERPL-SCNC: 139 MMOL/L (ref 133–144)
TRIGL SERPL-MCNC: 54 MG/DL
WBC # BLD AUTO: 8.9 10E9/L (ref 4–11)

## 2019-09-09 PROCEDURE — 80053 COMPREHEN METABOLIC PANEL: CPT | Performed by: PHYSICIAN ASSISTANT

## 2019-09-09 PROCEDURE — 99215 OFFICE O/P EST HI 40 MIN: CPT | Performed by: PHYSICIAN ASSISTANT

## 2019-09-09 PROCEDURE — 85025 COMPLETE CBC W/AUTO DIFF WBC: CPT | Performed by: PHYSICIAN ASSISTANT

## 2019-09-09 PROCEDURE — 80061 LIPID PANEL: CPT | Performed by: PHYSICIAN ASSISTANT

## 2019-09-09 PROCEDURE — 36415 COLL VENOUS BLD VENIPUNCTURE: CPT | Performed by: PHYSICIAN ASSISTANT

## 2019-09-09 ASSESSMENT — ANXIETY QUESTIONNAIRES
2. NOT BEING ABLE TO STOP OR CONTROL WORRYING: NOT AT ALL
GAD7 TOTAL SCORE: 0
5. BEING SO RESTLESS THAT IT IS HARD TO SIT STILL: NOT AT ALL
1. FEELING NERVOUS, ANXIOUS, OR ON EDGE: NOT AT ALL
7. FEELING AFRAID AS IF SOMETHING AWFUL MIGHT HAPPEN: NOT AT ALL
3. WORRYING TOO MUCH ABOUT DIFFERENT THINGS: NOT AT ALL
6. BECOMING EASILY ANNOYED OR IRRITABLE: NOT AT ALL

## 2019-09-09 ASSESSMENT — PATIENT HEALTH QUESTIONNAIRE - PHQ9
SUM OF ALL RESPONSES TO PHQ QUESTIONS 1-9: 3
5. POOR APPETITE OR OVEREATING: NOT AT ALL

## 2019-09-09 ASSESSMENT — MIFFLIN-ST. JEOR: SCORE: 1707.26

## 2019-09-09 NOTE — PROGRESS NOTES
[facility] [place]  [address] [address]  [phone]  Dept: [phone]    PRE-OP EVALUATION:  Today's date: 2019    Dustin Avila (: 1970) presents for pre-operative evaluation assessment as requested by Dr. Post.  He requires evaluation and anesthesia risk assessment prior to undergoing surgery/procedure for treatment of knee replacement (right) .    Proposed Surgery/ Procedure: right knee replacement  Date of Surgery/ Procedure: 2019  Time of Surgery/ Procedure:   Hospital/Surgical Facility: [facility]  Primary Physician: Oscar Husain  Type of Anesthesia Anticipated: to be determined    Patient has a Health Care Directive or Living Will:  NO    1. YES - Do you have a history of heart attack, stroke, stent, bypass or surgery on an artery in the head, neck, heart or legs?  2. YES - Do you ever have any pain or discomfort in your chest? Yes, angina pain  3. NO - Do you have a history of  Heart Failure?  4. YES - Are you troubled by shortness of breath when: walking on the level, up a slight hill or at night? YES  5. NO - Do you currently have a cold, bronchitis or other respiratory infection?  6. NO - Do you have a cough, shortness of breath or wheezing?  7. NO - Do you sometimes get pains in the calves of your legs when you walk?  8. NO - Do you or anyone in your family have previous history of blood clots?  9. NO - Do you or does anyone in your family have a serious bleeding problem such as prolonged bleeding following surgeries or cuts?  10. NO - Have you ever had problems with anemia or been told to take iron pills?  11. NO - Have you had any abnormal blood loss such as black, tarry or bloody stools, or abnormal vaginal bleeding?  12. NO - Have you ever had a blood transfusion?  13. NO - Have you or any of your relatives ever had problems with anesthesia?  14. NO - Do you have sleep apnea, excessive snoring or daytime  drowsiness?  15. NO - Do you have any prosthetic heart valves?  16. YES - Do you have prosthetic joints? HIPS  17. NO - Is there any chance that you may be pregnant?      HPI:     HPI related to upcoming procedure: Dustin presents to the clinic for preoperative examination  prior to right knee replacement. He has a hx of WPW syndrome as well as hx of CAD ( NSTEMI with 2 PIERRE placements LAD 2016).  He tells me that he is not taking his beta blocker or a statin medication for secondary prevention.  He has not been following with cardiology as advised.  He continues to have intermittent symptoms of angina. Continued tobacco use. He does have appointment with cardiology for clearance on 09/12/2019.     Over the past 3-4 weeks he has been experiencing nasal congestion, facial pressure and post nasal drip.  Symptoms began as a cold but have persisted.  No wheezing or cough noted.    See problem list for active medical problems.  Problems all longstanding and stable, except as noted/documented.  See ROS for pertinent symptoms related to these conditions.      MEDICAL HISTORY:     Patient Active Problem List    Diagnosis Date Noted     Old myocardial infarction 04/05/2017     Priority: Medium     Anxiety 03/13/2017     Priority: Medium     JOHN (generalized anxiety disorder) 11/15/2016     Priority: Medium     WPW (Joshua-Parkinson-White syndrome)      Priority: Medium     Coronary artery disease involving native coronary artery of native heart without angina pectoris      Priority: Medium     cardiac cath 2016:  + PIERRE to OM1, PIERRE to LAd, PIERRE to ramus       Hyperlipidemia      Priority: Medium     Hyperlipidemia LDL goal <70 06/17/2016     Priority: Medium     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     Priority: Medium     As per history        Past Medical History:   Diagnosis Date     Achilles tendonitis      Ankle edema      Arthritis      Coronary artery disease     cardiac cath 2016:  + PIERRE to OM1, PIERRE to  "LAd, PIERRE to ramus     Hyperlipidemia      Incidental lung nodule      Osgood-Schlatter/osteochondroses     left     WPW (Joshua-Parkinson-White syndrome)      Past Surgical History:   Procedure Laterality Date     ARTHROSCOPY KNEE  5/8/2012    Procedure:ARTHROSCOPY KNEE; left knee arthroscopy, partial medial and lateral meniscectomy ; Surgeon:MARIA M THOMAS; Location:[facility] OR     HERNIA REPAIR       ORTHOPEDIC SURGERY      left leg achilles rupture     ORTHOPEDIC SURGERY      right hip replacemnent     SEPTOPLASTY       Current Outpatient Medications   Medication Sig Dispense Refill     amoxicillin-clavulanate (AUGMENTIN) 875-125 MG tablet Take 1 tablet by mouth 2 times daily for 7 days 14 tablet 0     aspirin EC 81 MG EC tablet Take 1 tablet (81 mg) by mouth daily 90 tablet 3     OTC products: Aspirin    No Known Allergies   Latex Allergy: NO    Social History     Tobacco Use     Smoking status: Current Every Day Smoker     Packs/day: 0.25     Years: 35.00     Pack years: 8.75     Types: Cigarettes     Smokeless tobacco: Never Used   Substance Use Topics     Alcohol use: Yes     Alcohol/week: 0.0 oz     Comment: once weekly     History   Drug Use     Types: Marijuana       REVIEW OF SYSTEMS:   Constitutional, neuro, ENT, endocrine, pulmonary, cardiac, gastrointestinal, genitourinary, musculoskeletal, integument and psychiatric systems are negative, except as otherwise noted.    EXAM:   /74   Pulse 86   Temp 98.1  F (36.7  C) (Tympanic)   Resp 14   Ht 1.727 m (5' 8\")   Wt 85.3 kg (188 lb)   SpO2 95%   BMI 28.59 kg/m      GENERAL APPEARANCE: healthy, alert and no distress     EYES: EOMI,  PERRL     HENT: ear canals and TM's normal and nose and mouth without ulcers or lesions, bilaeral maxillary sinuses with TTP     NECK: no adenopathy, no asymmetry, masses, or scars and thyroid normal to palpation     RESP: lungs clear to auscultation - no rales, rhonchi or wheezes     CV: regular rates and rhythm, normal S1 " S2, no S3 or S4 and no murmur, click or rub     ABDOMEN:  soft, nontender, no HSM or masses and bowel sounds normal     MS: extremities normal- no gross deformities noted, no evidence of inflammation in joints, FROM in all extremities.     SKIN: no suspicious lesions or rashes     NEURO: Normal strength and tone, sensory exam grossly normal, mentation intact and speech normal     PSYCH: mentation appears normal. and affect normal/bright     LYMPHATICS: No cervical adenopathy    DIAGNOSTICS:   Patient elected to have cardiology perform EKG    Recent Labs   Lab Test 04/13/18  1141 03/31/17  0808 01/18/17  0033  06/05/16  1037   HGB  --  14.5 15.0   < > 14.0   PLT  --  267 243   < > 235    139 139   < >  --    POTASSIUM 4.3 4.3 3.4   < >  --    CR 0.79 0.78 0.80   < >  --    A1C  --   --   --   --  4.8    < > = values in this interval not displayed.      Results for orders placed or performed in visit on 09/09/19   CBC with platelets differential   Result Value Ref Range    WBC 8.9 4.0 - 11.0 10e9/L    RBC Count 4.95 4.4 - 5.9 10e12/L    Hemoglobin 14.8 13.3 - 17.7 g/dL    Hematocrit 44.4 40.0 - 53.0 %    MCV 90 78 - 100 fl    MCH 29.9 26.5 - 33.0 pg    MCHC 33.3 31.5 - 36.5 g/dL    RDW 14.6 10.0 - 15.0 %    Platelet Count 308 150 - 450 10e9/L    % Neutrophils 57.5 %    % Lymphocytes 30.1 %    % Monocytes 7.5 %    % Eosinophils 4.5 %    % Basophils 0.4 %    Absolute Neutrophil 5.1 1.6 - 8.3 10e9/L    Absolute Lymphocytes 2.7 0.8 - 5.3 10e9/L    Absolute Monocytes 0.7 0.0 - 1.3 10e9/L    Absolute Eosinophils 0.4 0.0 - 0.7 10e9/L    Absolute Basophils 0.0 0.0 - 0.2 10e9/L    Diff Method Automated Method    Lipid Profile (Chol, Trig, HDL, LDL calc)   Result Value Ref Range    Cholesterol 160 <200 mg/dL    Triglycerides 54 <150 mg/dL    HDL Cholesterol 39 (L) >39 mg/dL    LDL Cholesterol Calculated 110 (H) <100 mg/dL    Non HDL Cholesterol 121 <130 mg/dL   Comprehensive metabolic panel (BMP + Alb, Alk Phos, ALT, AST,  Total. Bili, TP)   Result Value Ref Range    Sodium 139 133 - 144 mmol/L    Potassium 4.3 3.4 - 5.3 mmol/L    Chloride 109 94 - 109 mmol/L    Carbon Dioxide 22 20 - 32 mmol/L    Anion Gap 8 3 - 14 mmol/L    Glucose 96 70 - 99 mg/dL    Urea Nitrogen 10 7 - 30 mg/dL    Creatinine 0.69 0.66 - 1.25 mg/dL    GFR Estimate >90 >60 mL/min/[1.73_m2]    GFR Estimate If Black >90 >60 mL/min/[1.73_m2]    Calcium 9.1 8.5 - 10.1 mg/dL    Bilirubin Total 0.3 0.2 - 1.3 mg/dL    Albumin 3.9 3.4 - 5.0 g/dL    Protein Total 7.1 6.8 - 8.8 g/dL    Alkaline Phosphatase 76 40 - 150 U/L    ALT 32 0 - 70 U/L    AST 18 0 - 45 U/L       IMPRESSION:   Reason for surgery/procedure: Right knee replacement  Diagnosis/reason for consult: Preoperative examination    The proposed surgical procedure is considered INTERMEDIATE risk.    REVISED CARDIAC RISK INDEX  The patient has the following serious cardiovascular risks for perioperative complications such as (MI, PE, VFib and 3  AV Block):  Coronary Artery Disease (MI, positive stress test, angina, Qs on EKG)  INTERPRETATION: 2 risks: Class III (moderate risk - 6.6% complication rate)    The patient has the following additional risks for perioperative complications:  No identified additional risks    1. Preop general physical exam  - CBC with platelets differential    2. Old myocardial infarction  See below    3. Coronary artery disease involving native coronary artery of native heart without angina pectoris    4. Pure hypercholesterolemia  Due for labs  - Lipid Profile (Chol, Trig, HDL, LDL calc)  - Comprehensive metabolic panel (BMP + Alb, Alk Phos, ALT, AST, Total. Bili, TP)    5. WPW (Joshua-Parkinson-White syndrome)  Per cardiology    6. Acute sinusitis with symptoms > 10 days  Start Augmentin BID x 7 days.  If new fevers, or persistent symptoms he should be rechecked prior to procedure.  - amoxicillin-clavulanate (AUGMENTIN) 875-125 MG tablet; Take 1 tablet by mouth 2 times daily for 7 days   Dispense: 14 tablet; Refill: 0    7. Tobacco use  Encouraged smoking cessation, he is not interested in resources at this time.       RECOMMENDATIONS:       Cardiovascular Risk  Non complaint with medications or with annual cardiology follow up.  Advise that he have cardiac clearance prior to procedure.  He will keep this appointment  **Preop stress imaging recommended due to current serious symptoms/signs that would warrant stress testing regardless of preoperative status**  Appointment       --Patient is to take all scheduled medications on the day of surgery EXCEPT for modifications listed below.    Anticoagulant or Antiplatelet Medication Use  ASPIRIN: Per cardiology recommendation        Approval for procedure is pending cardiac clearance       Signed Electronically by: Alex Florian PA-C    Copy of this evaluation report is provided to requesting physician.    [facility] Preop Guidelines    Revised Cardiac Risk Index    Addendum:  I have reviewed the above document, agree with the assessment and plan as outlined.  Optimized for the planned procedure  Oscar Husain MD MD  9/11/2019

## 2019-09-10 ASSESSMENT — ANXIETY QUESTIONNAIRES: GAD7 TOTAL SCORE: 0

## 2019-09-12 ENCOUNTER — OFFICE VISIT (OUTPATIENT)
Dept: CARDIOLOGY | Facility: CLINIC | Age: 49
End: 2019-09-12
Payer: COMMERCIAL

## 2019-09-12 VITALS
DIASTOLIC BLOOD PRESSURE: 74 MMHG | SYSTOLIC BLOOD PRESSURE: 120 MMHG | HEIGHT: 68 IN | HEART RATE: 80 BPM | BODY MASS INDEX: 28.64 KG/M2 | WEIGHT: 189 LBS

## 2019-09-12 DIAGNOSIS — I45.6 WPW (WOLFF-PARKINSON-WHITE SYNDROME): Primary | ICD-10-CM

## 2019-09-12 DIAGNOSIS — I25.2 OLD MYOCARDIAL INFARCTION: ICD-10-CM

## 2019-09-12 DIAGNOSIS — I25.10 CORONARY ARTERY DISEASE INVOLVING NATIVE CORONARY ARTERY OF NATIVE HEART WITHOUT ANGINA PECTORIS: ICD-10-CM

## 2019-09-12 PROCEDURE — 99204 OFFICE O/P NEW MOD 45 MIN: CPT | Performed by: INTERNAL MEDICINE

## 2019-09-12 PROCEDURE — 93000 ELECTROCARDIOGRAM COMPLETE: CPT | Performed by: INTERNAL MEDICINE

## 2019-09-12 RX ORDER — METOPROLOL SUCCINATE 25 MG/1
12.5 TABLET, EXTENDED RELEASE ORAL DAILY
Qty: 30 TABLET | Refills: 3 | Status: SHIPPED | OUTPATIENT
Start: 2019-09-12 | End: 2019-09-12

## 2019-09-12 RX ORDER — ATORVASTATIN CALCIUM 40 MG/1
40 TABLET, FILM COATED ORAL DAILY
Qty: 30 TABLET | Refills: 3 | Status: SHIPPED | OUTPATIENT
Start: 2019-09-12 | End: 2019-11-11

## 2019-09-12 ASSESSMENT — MIFFLIN-ST. JEOR: SCORE: 1706.8

## 2019-09-12 NOTE — RESULT ENCOUNTER NOTE
Dustin-  Here are your recent results.       -Normal red blood cell (hgb) levels, normal white blood cell count and normal platelet levels.  -LDL(bad) cholesterol level is elevated which can increase your heart disease risk.  I would recommend that your begin to take your statin daily as prescribed to help prevent another heart attack.  -Liver and gallbladder tests are normal (ALT,AST, Alk phos, bilirubin), kidney function is normal (Cr, GFR), sodium is normal, potassium is normal, calcium is normal, glucose is normal.    If you have any questions please do not hesitate to contact our office via phone ([phone]) or you may send me a message via [facility] by clicking the contact my Care Team link.      It was a pleasure meeting you and participating in your care!    Thank you,    Alex Florian MPH, PA-C  [address] [place] [address] [phone]

## 2019-09-12 NOTE — PROGRESS NOTES
HPI and Plan:   See dictation [id number]    Orders Placed This Encounter   Procedures     NM Lexiscan stress test (nuc card)     Follow-Up with Cardiac Advanced Practice Provider     EKG 12-lead complete w/read - Clinics (performed today)     Echocardiogram Complete     Orders Placed This Encounter   Medications     medical cannabis (Patient's own supply)     Sig: See Admin Instructions (The purpose of this order is to document that the patient reports taking medical cannabis.  This is not a prescription, and is not used to certify that the patient has a qualifying medical condition.)     DISCONTD: metoprolol succinate ER (TOPROL-XL) 25 MG 24 hr tablet     Sig: Take 0.5 tablets (12.5 mg) by mouth daily     Dispense:  30 tablet     Refill:  3     atorvastatin (LIPITOR) 40 MG tablet     Sig: Take 1 tablet (40 mg) by mouth daily     Dispense:  30 tablet     Refill:  3     Medications Discontinued During This Encounter   Medication Reason     metoprolol succinate ER (TOPROL-XL) 25 MG 24 hr tablet          Encounter Diagnoses   Name Primary?     WPW (Joshua-Parkinson-White syndrome) Yes     Old myocardial infarction      Coronary artery disease involving native coronary artery of native heart without angina pectoris        CURRENT MEDICATIONS:  Current Outpatient Medications   Medication Sig Dispense Refill     amoxicillin-clavulanate (AUGMENTIN) 875-125 MG tablet Take 1 tablet by mouth 2 times daily for 7 days 14 tablet 0     aspirin EC 81 MG EC tablet Take 1 tablet (81 mg) by mouth daily 90 tablet 3     atorvastatin (LIPITOR) 40 MG tablet Take 1 tablet (40 mg) by mouth daily 30 tablet 3     medical cannabis (Patient's own supply) See Admin Instructions (The purpose of this order is to document that the patient reports taking medical cannabis.  This is not a prescription, and is not used to certify that the patient has a qualifying medical condition.)         ALLERGIES   No Known Allergies    PAST MEDICAL HISTORY:  Past  Medical History:   Diagnosis Date     Achilles tendonitis      Ankle edema      Arthritis      Coronary artery disease     cardiac cath 2016:  + PIERRE to OM1, PIERRE to LAd, PIERRE to ramus     Hyperlipidemia      Incidental lung nodule      Osgood-Schlatter/osteochondroses     left     WPW (Joshua-Parkinson-White syndrome)        PAST SURGICAL HISTORY:  Past Surgical History:   Procedure Laterality Date     ARTHROSCOPY KNEE  5/8/2012    Procedure:ARTHROSCOPY KNEE; left knee arthroscopy, partial medial and lateral meniscectomy ; Surgeon:MARIA M THOMAS; Location:[facility] OR     HERNIA REPAIR       ORTHOPEDIC SURGERY      left leg achilles rupture     ORTHOPEDIC SURGERY      right hip replacemnent     SEPTOPLASTY         FAMILY HISTORY:  Family History   Problem Relation Age of Onset     Diabetes Father        SOCIAL HISTORY:  Social History     Socioeconomic History     Marital status:      Spouse name: seperated     Number of children: 2     Years of education: None     Highest education level: None   Occupational History     Occupation: self     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   Social Needs     Financial resource strain: None     Food insecurity:     Worry: None     Inability: None     Transportation needs:     Medical: None     Non-medical: None   Tobacco Use     Smoking status: Current Every Day Smoker     Packs/day: 0.25     Years: 35.00     Pack years: 8.75     Types: Cigarettes     Start date: 1986     Smokeless tobacco: Never Used     Tobacco comment: 0-10 cigs a day   Substance and Sexual Activity     Alcohol use: Yes     Alcohol/week: 0.0 oz     Comment: once weekly     Drug use: Yes     Types: Marijuana     Sexual activity: Yes     Partners: Female   Lifestyle     Physical activity:     Days per week: None     Minutes per session: None     Stress: None   Relationships     Social connections:     Talks on phone: None     Gets together: None     Attends Restorationism service: None     Active member of club or organization: None     " Attends meetings of clubs or organizations: None     Relationship status: None     Intimate partner violence:     Fear of current or ex partner: None     Emotionally abused: None     Physically abused: None     Forced sexual activity: None   Other Topics Concern     Parent/sibling w/ CABG, MI or angioplasty before 65F 55M? No      Service Not Asked     Blood Transfusions Not Asked     Caffeine Concern No     Occupational Exposure Not Asked     Hobby Hazards Not Asked     Sleep Concern Yes     Stress Concern Yes     Weight Concern Yes     Comment: weight loss     Special Diet Not Asked     Back Care Not Asked     Exercise Yes     Comment: cardiac rehab 3 days week     Bike Helmet Not Asked     Seat Belt Not Asked     Self-Exams Not Asked   Social History Narrative     None       Review of Systems:  Skin:  Negative     Eyes:  Negative    ENT:  Negative    Respiratory:  Positive for dyspnea on exertion(occasional with talking and need to take a deep breath)  Cardiovascular:  Negative    Gastroenterology: Negative    Genitourinary:  not assessed    Musculoskeletal:  Positive for joint pain;joint stiffness  Neurologic:  Negative    Psychiatric:  Positive for excessive stress  Heme/Lymph/Imm:  Negative    Endocrine:  Negative      Physical Exam:  Vitals: /74   Pulse 80   Ht 1.727 m (5' 8\")   Wt 85.7 kg (189 lb)   BMI 28.74 kg/m        Recent Lab Results:  LIPID RESULTS:  Lab Results   Component Value Date    CHOL 160 09/09/2019    HDL 39 (L) 09/09/2019     (H) 09/09/2019    TRIG 54 09/09/2019       LIVER ENZYME RESULTS:  Lab Results   Component Value Date    AST 18 09/09/2019    ALT 32 09/09/2019       CBC RESULTS:  Lab Results   Component Value Date    WBC 8.9 09/09/2019    RBC 4.95 09/09/2019    HGB 14.8 09/09/2019    HCT 44.4 09/09/2019    MCV 90 09/09/2019    MCH 29.9 09/09/2019    MCHC 33.3 09/09/2019    RDW 14.6 09/09/2019     09/09/2019       BMP RESULTS:  Lab Results   Component " Value Date     09/09/2019    POTASSIUM 4.3 09/09/2019    CHLORIDE 109 09/09/2019    CO2 22 09/09/2019    ANIONGAP 8 09/09/2019    GLC 96 09/09/2019    BUN 10 09/09/2019    CR 0.69 09/09/2019    GFRESTIMATED >90 09/09/2019    GFRESTBLACK >90 09/09/2019    GABRIELA 9.1 09/09/2019        A1C RESULTS:  Lab Results   Component Value Date    A1C 4.8 06/05/2016       INR RESULTS:  Lab Results   Component Value Date    INR 1.55 (H) 09/27/2010    INR 1.43 (H) 09/23/2010           CC  No referring provider defined for this encounter.

## 2019-09-12 NOTE — LETTER
9/12/2019    Oscar Husain MD  [address]  [place] [address]    RE: Dustin Avlia       Dear Colleague,    I had the pleasure of seeing Dustin Avila in the [facility].    HPI and Plan:   See dictation [id number]    Orders Placed This Encounter   Procedures     NM Lexiscan stress test (nuc card)     Follow-Up with Cardiac Advanced Practice Provider     EKG 12-lead complete w/read - Clinics (performed today)     Echocardiogram Complete     Orders Placed This Encounter   Medications     medical cannabis (Patient's own supply)     Sig: See Admin Instructions (The purpose of this order is to document that the patient reports taking medical cannabis.  This is not a prescription, and is not used to certify that the patient has a qualifying medical condition.)     DISCONTD: metoprolol succinate ER (TOPROL-XL) 25 MG 24 hr tablet     Sig: Take 0.5 tablets (12.5 mg) by mouth daily     Dispense:  30 tablet     Refill:  3     atorvastatin (LIPITOR) 40 MG tablet     Sig: Take 1 tablet (40 mg) by mouth daily     Dispense:  30 tablet     Refill:  3     Medications Discontinued During This Encounter   Medication Reason     metoprolol succinate ER (TOPROL-XL) 25 MG 24 hr tablet          Encounter Diagnoses   Name Primary?     WPW (Joshua-Parkinson-White syndrome) Yes     Old myocardial infarction      Coronary artery disease involving native coronary artery of native heart without angina pectoris        CURRENT MEDICATIONS:  Current Outpatient Medications   Medication Sig Dispense Refill     amoxicillin-clavulanate (AUGMENTIN) 875-125 MG tablet Take 1 tablet by mouth 2 times daily for 7 days 14 tablet 0     aspirin EC 81 MG EC tablet Take 1 tablet (81 mg) by mouth daily 90 tablet 3     atorvastatin (LIPITOR) 40 MG tablet Take 1 tablet (40 mg) by mouth daily 30 tablet 3     medical cannabis (Patient's own supply) See Admin Instructions (The purpose of this order is to document that the patient  reports taking medical cannabis.  This is not a prescription, and is not used to certify that the patient has a qualifying medical condition.)         ALLERGIES   No Known Allergies    PAST MEDICAL HISTORY:  Past Medical History:   Diagnosis Date     Achilles tendonitis      Ankle edema      Arthritis      Coronary artery disease     cardiac cath 2016:  + PIERRE to OM1, PIERRE to LAd, PIERRE to ramus     Hyperlipidemia      Incidental lung nodule      Osgood-Schlatter/osteochondroses     left     WPW (Joshua-Parkinson-White syndrome)        PAST SURGICAL HISTORY:  Past Surgical History:   Procedure Laterality Date     ARTHROSCOPY KNEE  5/8/2012    Procedure:ARTHROSCOPY KNEE; left knee arthroscopy, partial medial and lateral meniscectomy ; Surgeon:MARIA M THOMAS; Location:[facility] OR     HERNIA REPAIR       ORTHOPEDIC SURGERY      left leg achilles rupture     ORTHOPEDIC SURGERY      right hip replacemnent     SEPTOPLASTY         FAMILY HISTORY:  Family History   Problem Relation Age of Onset     Diabetes Father        SOCIAL HISTORY:  Social History     Socioeconomic History     Marital status:      Spouse name: seperated     Number of children: 2     Years of education: None     Highest education level: None   Occupational History     Occupation: self     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   Social Needs     Financial resource strain: None     Food insecurity:     Worry: None     Inability: None     Transportation needs:     Medical: None     Non-medical: None   Tobacco Use     Smoking status: Current Every Day Smoker     Packs/day: 0.25     Years: 35.00     Pack years: 8.75     Types: Cigarettes     Start date: 1986     Smokeless tobacco: Never Used     Tobacco comment: 0-10 cigs a day   Substance and Sexual Activity     Alcohol use: Yes     Alcohol/week: 0.0 oz     Comment: once weekly     Drug use: Yes     Types: Marijuana     Sexual activity: Yes     Partners: Female   Lifestyle     Physical activity:     Days per week: None      "Minutes per session: None     Stress: None   Relationships     Social connections:     Talks on phone: None     Gets together: None     Attends Islam service: None     Active member of club or organization: None     Attends meetings of clubs or organizations: None     Relationship status: None     Intimate partner violence:     Fear of current or ex partner: None     Emotionally abused: None     Physically abused: None     Forced sexual activity: None   Other Topics Concern     Parent/sibling w/ CABG, MI or angioplasty before 65F 55M? No      Service Not Asked     Blood Transfusions Not Asked     Caffeine Concern No     Occupational Exposure Not Asked     Hobby Hazards Not Asked     Sleep Concern Yes     Stress Concern Yes     Weight Concern Yes     Comment: weight loss     Special Diet Not Asked     Back Care Not Asked     Exercise Yes     Comment: cardiac rehab 3 days week     Bike Helmet Not Asked     Seat Belt Not Asked     Self-Exams Not Asked   Social History Narrative     None       Review of Systems:  Skin:  Negative     Eyes:  Negative    ENT:  Negative    Respiratory:  Positive for dyspnea on exertion(occasional with talking and need to take a deep breath)  Cardiovascular:  Negative    Gastroenterology: Negative    Genitourinary:  not assessed    Musculoskeletal:  Positive for joint pain;joint stiffness  Neurologic:  Negative    Psychiatric:  Positive for excessive stress  Heme/Lymph/Imm:  Negative    Endocrine:  Negative      Physical Exam:  Vitals: /74   Pulse 80   Ht 1.727 m (5' 8\")   Wt 85.7 kg (189 lb)   BMI 28.74 kg/m         Recent Lab Results:  LIPID RESULTS:  Lab Results   Component Value Date    CHOL 160 09/09/2019    HDL 39 (L) 09/09/2019     (H) 09/09/2019    TRIG 54 09/09/2019       LIVER ENZYME RESULTS:  Lab Results   Component Value Date    AST 18 09/09/2019    ALT 32 09/09/2019       CBC RESULTS:  Lab Results   Component Value Date    WBC 8.9 09/09/2019    RBC " 4.95 09/09/2019    HGB 14.8 09/09/2019    HCT 44.4 09/09/2019    MCV 90 09/09/2019    MCH 29.9 09/09/2019    MCHC 33.3 09/09/2019    RDW 14.6 09/09/2019     09/09/2019       BMP RESULTS:  Lab Results   Component Value Date     09/09/2019    POTASSIUM 4.3 09/09/2019    CHLORIDE 109 09/09/2019    CO2 22 09/09/2019    ANIONGAP 8 09/09/2019    GLC 96 09/09/2019    BUN 10 09/09/2019    CR 0.69 09/09/2019    GFRESTIMATED >90 09/09/2019    GFRESTBLACK >90 09/09/2019    GABRIELA 9.1 09/09/2019        A1C RESULTS:  Lab Results   Component Value Date    A1C 4.8 06/05/2016       INR RESULTS:  Lab Results   Component Value Date    INR 1.55 (H) 09/27/2010    INR 1.43 (H) 09/23/2010           CC  No referring provider defined for this encounter.                    Thank you for allowing me to participate in the care of your patient.      Sincerely,     Roxana Rahman MD     [facility]    cc:   No referring provider defined for this encounter.

## 2019-09-12 NOTE — LETTER
9/12/2019      Oscar Husain MD  [address]  [place] [address]      RE: Dustin Avila       Dear Colleague,    I had the pleasure of seeing Dustin Snydero in the [facility].    Service Date: 09/12/2019      HISTORY OF PRESENT ILLNESS:  This is a very pleasant 49-year-old patient of Dr. Kinney who we saw in 2016 and since then did not follow up.  When he was 46, currently 49, he had suffered a non-ST elevation myocardial infarction in 06/2016.  He had stents placed in his LAD, ramus intermedius and first obtuse marginal branches.  He was left with an occluded PL branch of the right coronary artery that filled from left-to-right collaterals.  EF was 50%-55%.  He also has Joshua-Parkinson-White pattern diagnosed in 2011.  He is asymptomatic from this standpoint.  He did not follow up in the last 2-3 years and says he has stopped taking his beta blocker and statin because he is quite concerned about the side effects of that and did not know if he actually needed it.  He says that he himself stopped the statin, but he says that he was told to stop the metoprolol but does not remember who.  He continues to take aspirin monotherapy at this time.  In the last 2-3 years he has relatively done okay, but in the last few months he has been having left arm pain which he feels like twinging.  This is not always exertional, and sometimes he gets minimally short of breath, but otherwise no major cardiopulmonary symptoms that he attributes to.  No syncope, presyncope.  He continues to smoke at this time.  He is seeing me today in preop consultation for a knee surgery that he says is scheduled for the next few months.      PHYSICAL EXAMINATION:   VITAL SIGNS:  Blood pressure is 120/74 with a pulse of 80.   GENERAL:  Alert and oriented x3, in no acute distress.   NECK:  Supple.  JVP is normal.   LUNGS:  Clear.   CARDIAC:  Cardiac sounds are regular without rubs or gallops.  There is a very  soft systolic murmur.   EXTREMITIES:  Warm without edema.   PSYCHIATRIC:  Appropriate affect.   HEENT:  No icterus, pallor.      PERTINENT DATA:  , HDL 39.  BMP is okay.  Hemoglobin and platelet counts are normal.  ECG from today shows normal sinus rhythm with some nonspecific T-wave flattening.  There is short UT interval at 104 milliseconds with very mild preexcitation, if any.      ASSESSMENT AND PLAN:  Mr. Avila is seeking preoperative consultation for upcoming surgery in the next 1-2 months.  He has these atypical symptoms of chest discomfort and left arm pain that may or may not be related to exertion, he says.  His LDL is above goal, and he does not take any medications besides aspirin.  At this time, given his clinical scenario, I would start off with an echocardiogram.  We did discuss options of invasive coronary angiography versus a nuclear stress test.  He wants to proceed with a nuclear pharmacologic stress test.  He understands that a negative stress test does not rule out the possibility of major adverse cardiovascular events related to plaque rupture or related to the overall sensitivity of the test.  He needs to have ongoing risk factor modifications, irrespective of the stress test results.  At this time, given his WPW pattern on his ECG and the fact that he is not terribly symptomatic, if his EF is normal, I think it is reasonable for him to stay off the metoprolol.  He does not have classic symptoms of angina.  On the other hand, statin therapy is recommended to get his LDL less than 70.  He understands the risks and benefits associated with statin use in his particular case.  I have strongly reinforced him not to smoke.  He understands that and he is strongly trying to quit that.  If he has any resting chest discomfort or other symptoms that are worse, then he needs to come into the ED.  Otherwise, we will have him follow up in about a month with the results of the echo and stress test.   He needs close followup in 6 months in followup and routine followup with a cardiologist.  He can choose to follow up with Dr. Kinney or myself depending on his wishes.      cc:   Oscar Husain MD    [address]  [address]         ROXANA RAHMAN MD             D: 2019   T: 2019   MT: KATELIN      Name:     TAMMI WEBSTER   MRN:      [id number]        Account:      [id number]   :      1970           Service Date: 2019      Document: [id number]         Outpatient Encounter Medications as of 2019   Medication Sig Dispense Refill     [] amoxicillin-clavulanate (AUGMENTIN) 875-125 MG tablet Take 1 tablet by mouth 2 times daily for 7 days 14 tablet 0     aspirin EC 81 MG EC tablet Take 1 tablet (81 mg) by mouth daily 90 tablet 3     atorvastatin (LIPITOR) 40 MG tablet Take 1 tablet (40 mg) by mouth daily 30 tablet 3     medical cannabis (Patient's own supply) See Admin Instructions (The purpose of this order is to document that the patient reports taking medical cannabis.  This is not a prescription, and is not used to certify that the patient has a qualifying medical condition.)       [DISCONTINUED] metoprolol succinate ER (TOPROL-XL) 25 MG 24 hr tablet Take 0.5 tablets (12.5 mg) by mouth daily 30 tablet 3     No facility-administered encounter medications on file as of 2019.        Again, thank you for allowing me to participate in the care of your patient.      Sincerely,    Roxana Rahman MD     [facility]

## 2019-09-12 NOTE — PROGRESS NOTES
Service Date: 09/12/2019      HISTORY OF PRESENT ILLNESS:  This is a very pleasant 49-year-old patient of Dr. Kinney who we saw in 2016 and since then did not follow up.  When he was 46, currently 49, he had suffered a non-ST elevation myocardial infarction in 06/2016.  He had stents placed in his LAD, ramus intermedius and first obtuse marginal branches.  He was left with an occluded PL branch of the right coronary artery that filled from left-to-right collaterals.  EF was 50%-55%.  He also has Joshua-Parkinson-White pattern diagnosed in 2011.  He is asymptomatic from this standpoint.  He did not follow up in the last 2-3 years and says he has stopped taking his beta blocker and statin because he is quite concerned about the side effects of that and did not know if he actually needed it.  He says that he himself stopped the statin, but he says that he was told to stop the metoprolol but does not remember who.  He continues to take aspirin monotherapy at this time.  In the last 2-3 years he has relatively done okay, but in the last few months he has been having left arm pain which he feels like twinging.  This is not always exertional, and sometimes he gets minimally short of breath, but otherwise no major cardiopulmonary symptoms that he attributes to.  No syncope, presyncope.  He continues to smoke at this time.  He is seeing me today in preop consultation for a knee surgery that he says is scheduled for the next few months.      PHYSICAL EXAMINATION:   VITAL SIGNS:  Blood pressure is 120/74 with a pulse of 80.   GENERAL:  Alert and oriented x3, in no acute distress.   NECK:  Supple.  JVP is normal.   LUNGS:  Clear.   CARDIAC:  Cardiac sounds are regular without rubs or gallops.  There is a very soft systolic murmur.   EXTREMITIES:  Warm without edema.   PSYCHIATRIC:  Appropriate affect.   HEENT:  No icterus, pallor.      PERTINENT DATA:  , HDL 39.  BMP is okay.  Hemoglobin and platelet counts are normal.   ECG from today shows normal sinus rhythm with some nonspecific T-wave flattening.  There is short DC interval at 104 milliseconds with very mild preexcitation, if any.      ASSESSMENT AND PLAN:  Mr. Avila is seeking preoperative consultation for upcoming surgery in the next 1-2 months.  He has these atypical symptoms of chest discomfort and left arm pain that may or may not be related to exertion, he says.  His LDL is above goal, and he does not take any medications besides aspirin.  At this time, given his clinical scenario, I would start off with an echocardiogram.  We did discuss options of invasive coronary angiography versus a nuclear stress test.  He wants to proceed with a nuclear pharmacologic stress test.  He understands that a negative stress test does not rule out the possibility of major adverse cardiovascular events related to plaque rupture or related to the overall sensitivity of the test.  He needs to have ongoing risk factor modifications, irrespective of the stress test results.  At this time, given his WPW pattern on his ECG and the fact that he is not terribly symptomatic, if his EF is normal, I think it is reasonable for him to stay off the metoprolol.  He does not have classic symptoms of angina.  On the other hand, statin therapy is recommended to get his LDL less than 70.  He understands the risks and benefits associated with statin use in his particular case.  I have strongly reinforced him not to smoke.  He understands that and he is strongly trying to quit that.  If he has any resting chest discomfort or other symptoms that are worse, then he needs to come into the ED.  Otherwise, we will have him follow up in about a month with the results of the echo and stress test.  He needs close followup in 6 months in followup and routine followup with a cardiologist.  He can choose to follow up with Dr. Kinney or myself depending on his wishes.      cc:   Oscar Husain MD    [place]  [address]  [address]         IRENA ARMENDARIZ MD             D: 2019   T: 2019   MT: KATELIN      Name:     TAMMI WEBSTER   MRN:      [id number]        Account:      [id number]   :      1970           Service Date: 2019      Document: [id number]

## 2019-10-09 ENCOUNTER — HOSPITAL ENCOUNTER (OUTPATIENT)
Dept: CARDIOLOGY | Facility: CLINIC | Age: 49
Discharge: HOME | End: 2019-10-09
Attending: INTERNAL MEDICINE | Admitting: INTERNAL MEDICINE
Payer: COMMERCIAL

## 2019-10-09 DIAGNOSIS — I25.2 OLD MYOCARDIAL INFARCTION: ICD-10-CM

## 2019-10-09 DIAGNOSIS — I25.10 CORONARY ARTERY DISEASE INVOLVING NATIVE CORONARY ARTERY OF NATIVE HEART WITHOUT ANGINA PECTORIS: ICD-10-CM

## 2019-10-09 PROCEDURE — 93306 TTE W/DOPPLER COMPLETE: CPT | Mod: 26 | Performed by: INTERNAL MEDICINE

## 2019-10-09 PROCEDURE — 93306 TTE W/DOPPLER COMPLETE: CPT

## 2019-10-16 DIAGNOSIS — J01.90 ACUTE SINUSITIS WITH SYMPTOMS > 10 DAYS: ICD-10-CM

## 2019-10-16 DIAGNOSIS — I25.10 CORONARY ARTERY DISEASE INVOLVING NATIVE CORONARY ARTERY OF NATIVE HEART WITHOUT ANGINA PECTORIS: Primary | ICD-10-CM

## 2019-10-16 RX ORDER — NITROGLYCERIN 0.4 MG/1
TABLET SUBLINGUAL
Qty: 25 TABLET | Refills: 3 | Status: SHIPPED | OUTPATIENT
Start: 2019-10-16 | End: 2020-10-27

## 2019-10-16 NOTE — TELEPHONE ENCOUNTER
"Routing refill request to provider for review/approval because:  Med was discontinued by MA   \"Patient not taking: Reported on 9/9/2019\"    "

## 2019-10-16 NOTE — TELEPHONE ENCOUNTER
amoxicillin-clavulanate (AUGMENTIN) 875-125 MG tablet      Last Written Prescription Date:  9-9-19  Last Fill Quantity: 14,   # refills: 0  Last Office Visit: 9-9-19 with ARMANDO Florian  Future Office visit:       Routing refill request to provider for review/approval because:  Drug not on the [facility], [facility] or [facility] refill protocol or controlled substance

## 2019-10-16 NOTE — TELEPHONE ENCOUNTER
"Requested Prescriptions   Pending Prescriptions Disp Refills     nitroGLYcerin (NITROSTAT) 0.4 MG sublingual tablet [Pharmacy Med Name: NITROGLYCERIN 0.4 MG TABLET SL] 25 tablet 11     Sig: PLACE 1 TAB TO TONGUE EVERY 5 MINS AS NEEDED-CHEST PAIN. CALL 911 AFTER 3 DOSES IF SYMPTOMS PERSIST       Nitrates Failed - 10/16/2019 11:04 AM        Failed - Sublingual nitro order needs review     If refill exceeds 1 bottle per month, please forward request to provider.           Failed - Medication is active on med list        Passed - Blood pressure under 140/90 in past 12 months     BP Readings from Last 3 Encounters:   09/12/19 120/74   09/09/19 118/74   04/13/18 112/74                 Passed - Pt is not on erectile dysfunction medications        Passed - Recent (12 mo) or future (30 days) visit within the authorizing provider's specialty     Patient has had an office visit with the authorizing provider or a provider within the authorizing providers department within the previous 12 mos or has a future within next 30 days. See \"Patient Info\" tab in inbasket, or \"Choose Columns\" in Meds & Orders section of the refill encounter.              Passed - Patient is age 18 or older        nitroGLYcerin (NITROSTAT) 0.4 MG sublingual tablet (Discontinued)  Last Written Prescription Date:  4-13-18  Last Fill Quantity: 25,  # refills: 11   Last office visit: 9/9/2019 with prescribing provider:  ARMANDO Florian   Future Office Visit:      "

## 2019-10-16 NOTE — TELEPHONE ENCOUNTER
Rx was given for acute sinusitis. Not a refillable med. Rx denied.   Shelby Frausto RN   [facility] - Triage

## 2019-10-31 ENCOUNTER — HOSPITAL ENCOUNTER (OUTPATIENT)
Dept: CARDIOLOGY | Facility: CLINIC | Age: 49
Discharge: HOME | End: 2019-10-31
Attending: INTERNAL MEDICINE | Admitting: INTERNAL MEDICINE
Payer: COMMERCIAL

## 2019-10-31 VITALS — SYSTOLIC BLOOD PRESSURE: 122 MMHG | DIASTOLIC BLOOD PRESSURE: 80 MMHG

## 2019-10-31 DIAGNOSIS — I25.2 OLD MYOCARDIAL INFARCTION: ICD-10-CM

## 2019-10-31 DIAGNOSIS — I25.10 CORONARY ARTERY DISEASE INVOLVING NATIVE CORONARY ARTERY OF NATIVE HEART WITHOUT ANGINA PECTORIS: ICD-10-CM

## 2019-10-31 PROCEDURE — 25000128 H RX IP 250 OP 636: Performed by: INTERNAL MEDICINE

## 2019-10-31 PROCEDURE — A9502 TC99M TETROFOSMIN: HCPCS | Performed by: INTERNAL MEDICINE

## 2019-10-31 PROCEDURE — 93018 CV STRESS TEST I&R ONLY: CPT | Performed by: INTERNAL MEDICINE

## 2019-10-31 PROCEDURE — 78452 HT MUSCLE IMAGE SPECT MULT: CPT

## 2019-10-31 PROCEDURE — 34300033 ZZH RX 343: Performed by: INTERNAL MEDICINE

## 2019-10-31 PROCEDURE — 93016 CV STRESS TEST SUPVJ ONLY: CPT | Performed by: INTERNAL MEDICINE

## 2019-10-31 PROCEDURE — 78452 HT MUSCLE IMAGE SPECT MULT: CPT | Mod: 26 | Performed by: INTERNAL MEDICINE

## 2019-10-31 RX ORDER — REGADENOSON 0.08 MG/ML
0.4 INJECTION, SOLUTION INTRAVENOUS ONCE
Status: COMPLETED | OUTPATIENT
Start: 2019-10-31 | End: 2019-10-31

## 2019-10-31 RX ORDER — AMINOPHYLLINE 25 MG/ML
50-100 INJECTION, SOLUTION INTRAVENOUS
Status: DISCONTINUED | OUTPATIENT
Start: 2019-10-31 | End: 2019-11-01 | Stop reason: HOSPADM

## 2019-10-31 RX ORDER — ALBUTEROL SULFATE 90 UG/1
2 AEROSOL, METERED RESPIRATORY (INHALATION) EVERY 5 MIN PRN
Status: DISCONTINUED | OUTPATIENT
Start: 2019-10-31 | End: 2019-11-01 | Stop reason: HOSPADM

## 2019-10-31 RX ORDER — ACYCLOVIR 200 MG/1
0-1 CAPSULE ORAL
Status: DISCONTINUED | OUTPATIENT
Start: 2019-10-31 | End: 2019-11-01 | Stop reason: HOSPADM

## 2019-10-31 RX ADMIN — TETROFOSMIN 3.32 MCI.: 1.38 INJECTION, POWDER, LYOPHILIZED, FOR SOLUTION INTRAVENOUS at 08:28

## 2019-10-31 RX ADMIN — REGADENOSON 0.4 MG: 0.08 INJECTION, SOLUTION INTRAVENOUS at 10:10

## 2019-10-31 RX ADMIN — TETROFOSMIN 8.87 MCI.: 1.38 INJECTION, POWDER, LYOPHILIZED, FOR SOLUTION INTRAVENOUS at 10:17

## 2019-11-01 ENCOUNTER — CARE COORDINATION (OUTPATIENT)
Dept: CARDIOLOGY | Facility: CLINIC | Age: 49
End: 2019-11-01

## 2019-11-01 DIAGNOSIS — I25.10 CORONARY ARTERY DISEASE INVOLVING NATIVE CORONARY ARTERY OF NATIVE HEART WITHOUT ANGINA PECTORIS: Primary | ICD-10-CM

## 2019-11-01 RX ORDER — METOPROLOL SUCCINATE 25 MG/1
25 TABLET, EXTENDED RELEASE ORAL DAILY
Qty: 90 TABLET | Refills: 3 | Status: SHIPPED | OUTPATIENT
Start: 2019-11-01 | End: 2020-11-10

## 2019-11-01 NOTE — PROGRESS NOTES
Pt called back and I reviewed nuclear stress test results with him and recommendations from . Pt told me he needs bilateral knee replacement surgery. Pt said he is bone on bone, and will need surgery in the next two months. He does not have an exact date set yet. Pt preferred to come for follow up on Wednesday. Pt said he has had some very brief episodes of chest pain since seeing  in September. He carries SL nitroglycerin with him at all times, and is aware of how to take it. Pt knows he needs to go to ED for any prolonged episodes of chest pain.     Pt wanted to know the next step. I said  will likely recommend a coronary angiogram, which is why he wanted to know surgery timeframe. I told pt that can influence what type of stents he would get if they are needed. Pt said if he needs a cath he would prefer  do it. Pt transferred to scheduling to set up [facility] appt. Will send an update to . Alfie CONTRERAS November 1, 2019, 3:40 PM

## 2019-11-01 NOTE — PROGRESS NOTES
Message not received until today, as this RN was off yesterday.  Call placed to pt, no answer, left  requesting call back to review Lexiscan stress test results and for new recommendations from MD. Elodia Morales, RN on 11/1/2019 at 11:26 AM      Result Notes for NM Lexiscan stress test (nuc card)     Notes recorded by Roxana Rahman MD on 10/31/2019 at 2:03 PM CDT  Dr. Tatum told me about these results.   1. Start Metoprolol XL 25 mg daily  2. When is his surgery scheduled?  3. Please have him see me or Jessi Guzman or Dr. Kinney in clinic prior to that in the next week if possible. Thank you.   NM Lexiscan stress test (nuc card)   Order: [id number]   Status:  Final result   Visible to patient:  No (Not Released) Dx:  Old myocardial infarction; Coronary a...   Details     Reading Physician Reading Date Result Priority   Zane Tatum MD 10/31/2019       Narrative & Impression     GATED MYOCARDIAL PERFUSION SCINTIGRAPHY WITH INTRAVENOUS PHARMACOLOGIC  VASODILATATION LEXISCAN -ONE DAY STUDY      10/31/2019 10:18 AM  TAMMI WEBSTER  49 years  Male  1970.     Indication/Clinical History: Coronary artery disease, preoperative  cardiac clearance     Impression  1.  Myocardial perfusion imaging using single isotope technique  demonstrated medium sized partially reversible inferior and  inferoseptal defect consistent with mid-basal inferior scar with small  to moderate area of  mild makr-infarct ischemia in the distal  inferior, mid-basal inferoseptal and basal inferolateral portion of  the left ventricle. Presence of visceral tracer artifact decreases  accuracy.  Small area of mild ischemia is seen in the basal anterior portion of  the left ventricle.  2. Gated images demonstrated normal size left ventricle with mild  inferior hypokinesis.  The left ventricular systolic function is  mildly reduced at 45% post.  3. Compared to the prior study from  .

## 2019-11-06 ENCOUNTER — OFFICE VISIT (OUTPATIENT)
Dept: CARDIOLOGY | Facility: CLINIC | Age: 49
End: 2019-11-06
Payer: COMMERCIAL

## 2019-11-06 VITALS
BODY MASS INDEX: 29.54 KG/M2 | DIASTOLIC BLOOD PRESSURE: 78 MMHG | HEART RATE: 76 BPM | WEIGHT: 194.9 LBS | HEIGHT: 68 IN | SYSTOLIC BLOOD PRESSURE: 110 MMHG

## 2019-11-06 DIAGNOSIS — R94.39 ABNORMAL CARDIOVASCULAR STRESS TEST: ICD-10-CM

## 2019-11-06 DIAGNOSIS — I45.6 WOLFF-PARKINSON-WHITE (WPW) PATTERN: ICD-10-CM

## 2019-11-06 DIAGNOSIS — I25.10 CORONARY ARTERY DISEASE INVOLVING NATIVE CORONARY ARTERY OF NATIVE HEART WITHOUT ANGINA PECTORIS: Primary | ICD-10-CM

## 2019-11-06 DIAGNOSIS — I25.2 OLD MYOCARDIAL INFARCTION: ICD-10-CM

## 2019-11-06 DIAGNOSIS — E78.5 HYPERLIPIDEMIA LDL GOAL <70: ICD-10-CM

## 2019-11-06 DIAGNOSIS — Z72.0 TOBACCO ABUSE: ICD-10-CM

## 2019-11-06 PROCEDURE — 99214 OFFICE O/P EST MOD 30 MIN: CPT | Performed by: NURSE PRACTITIONER

## 2019-11-06 RX ORDER — SODIUM CHLORIDE 9 MG/ML
INJECTION, SOLUTION INTRAVENOUS CONTINUOUS
Status: CANCELLED | OUTPATIENT
Start: 2019-11-06

## 2019-11-06 RX ORDER — LIDOCAINE 40 MG/G
CREAM TOPICAL
Status: CANCELLED | OUTPATIENT
Start: 2019-11-06

## 2019-11-06 ASSESSMENT — MIFFLIN-ST. JEOR: SCORE: 1733.56

## 2019-11-06 NOTE — PATIENT INSTRUCTIONS
Thanks for coming into the [facility] clinic today.    Today's plan:   1. We will arrange for a coronary angiogram.  2. Follow up in 1 to 2 weeks after the angiogram with fasting labs beforehand.  3. Follow up with Dr. Kinney or Dr. Rahman around April or May this spring.     If you have questions or concerns, please call my nurse at [phone].    Scheduling phone number: [phone]    Reminder: Please bring in all current medications, any over the counter supplements, and any vitamin bottles to your next appointment.    It was a pleasure seeing you today!     ANTELMO Sepulveda, CNP   11/06/19

## 2019-11-06 NOTE — LETTER
"11/6/2019    Oscar Husain MD  [address]  [place] [address]    RE: Dustin OLVERA Austin       Dear Colleague,    I had the pleasure of seeing Dustin Avila in the [facility].    Cardiology Clinic Progress Note    Service Date: November 6, 2019    PRIMARY CARDIOLOGIST:   Dr. Kinney      REASON FOR VISIT: Follow up of stress test results    HPI:   I had the pleasure of meeting Mr. Chan \"Cynthia\"Austin in the clinic today. He is a very pleasant 49 year old male. He has previously followed with Dr. Kinney for his cardiology care, but was lost to follow up for a couple of years until this fall. He was recently seen by Dr. Rahman last month for pre-operative clearance for a possible knee surgery in the next couple of months. He has a history of coronary artery disease, initially presenting in June of 2016 with a non-ST elevation myocardial infarction. He had stents placed in his LAD, ramus intermedius, and first obtuse marginal branches at that time. He also had an occluded posterolateral branch of the right coronary artery that filled from left-to-right collaterals. His ejection fraction was 50%-55% post intervention. He also has a history of Joshua-Parkinson-White pattern on EKG diagnosed in 2011. This has remained quiet and has not seem to manifest with any arrhythmias or symptoms.     He had been off of his beta blocker and statin for some time prior to seeing Dr. Rahman. He frankly admits that he does not like taking medications and he did not know if he actually needed to continue them.  He did continue to take aspirin 81 mg daily, and he was restarted on atorvastatin 40 mg once daily following the visit. Metoprolol succinate 25 mg daily was also restarted last week. Over the last few months, he has been having left arm pain which feels like a twinging sensation. It sometimes occurs with exertion, but not always. He also has had some dyspnea on exertion. Because of this, Dr." Lacey recommended he complete an echocardiogram and Lexiscan nuclear stress test. The echocardiogram revealed low normal LV systolic function with an ejection fraction estimated at 50-55%. There was grade I diastolic dysfunction. The nuclear study was done last week on 10/31/19 and showed a medium sized partially reversible inferior and inferoseptal defect consistent with mid-basal inferior scar with small to moderate area of mild mark-infarct ischemia in the distal inferior, mid-basal inferoseptal, and basal inferolateral portion of the left ventricle. A small area of mild ischemia was also noted in the basal anterior portion of the left ventricle.    Today, he presents to the clinic in follow up of these results. He continues to have the same intermittent left arm discomfort and dyspnea on exertion. He denies chest pain. He has not had orthopnea, PND, or lower extremity edema. He has not had palpitations or dizziness. The stress test results were reviewed with Dr. Rahman who recommended discussing a coronary angiogram. This was discussed with the patient in detail today. He stated that osteoarthritis in his knees bilaterally and associated knee pain has limited his ability to exercise and he is eventually planning to have surgery for this so he can get more active. He is agreeable to postponing this to complete at least 6 months to 1 year of dual antiplatelet therapy uninterrupted should he have any flow limiting lesions warranting stenting. He planned to call his orthopedic physician's office to see if a cortisone injection could possibly be arranged prior to proceeding with the angiogram. Risks and benefits of the coronary angiogram were discussed with the patient today including, bleeding, bruising, infection, allergic reaction, kidney damage (including need for dialysis), stroke, heart attack, vascular damage, emergency open heart surgery, up to and including death. Patient indicates understanding and is  agreeable to proceed. He has no known history of an allergy to contrast dye. All questions were answered.    ASSESSMENT AND PLAN:  1. Coronary artery disease, s/p stenting of the LAD, ramus, and OM1 in 2016    Continue aspirin 81 mg daily, metoprolol succinate 25 mg daily, and high intensity statin.    Plan for a coronary angiogram with possible revascularization given the abnormal stress test and his continued atypical symptoms of left arm discomfort and dyspnea on exertion as noted above.   2. Hypertension, well-controlled  3. Dyslipidemia    LDL cholesterol was elevated at 110 on 9/9/19 as he had not been taking his statin.    He has now been on atorvastatin 40 mg once daily since 9/12/19.    Will repeat a fasting lipid panel and ALT at his follow up visit post angiogram.  4. Tobacco Abuse    He tells me that he quit cold turkey 5 days ago after hearing about his stress test results.    Commended him on quitting and encouraged continued abstinence from smoking.  5. History of Joshua-Parkinson-White Pattern     Asymptomatic without recent concerns for palpitations or dizziness.     Thank you for allowing me to participate in this pleasant patient's care. We will arrange for a coronary angiogram. He will see myself or another Cardiology RADHA in follow up in 1 to 2 weeks following the procedure with a repeat fasting lipid panel and ALT beforehand. He will see Dr. Kinney or Dr. Rahman as planned for a 6 month follow up visit around May of this coming spring. We would be happy to see him sooner if needed for any concerns in the meantime.    ANTELMO Sepulveda, CNP   Text Page  (8am - 5pm, M-F)    Orders this Visit:  Orders Placed This Encounter   Procedures     Lipid panel reflex to direct LDL Fasting     ALT     Follow-Up with Cardiologist     No orders of the defined types were placed in this encounter.    Medications Discontinued During This Encounter   Medication Reason     amoxicillin-clavulanate (AUGMENTIN)  875-125 MG tablet Therapy completed     Encounter Diagnoses   Name Primary?     Coronary artery disease involving native coronary artery of native heart without angina pectoris Yes     Abnormal cardiovascular stress test      Hyperlipidemia LDL goal <70      Tobacco abuse      Joshua-Parkinson-White (WPW) pattern      Old myocardial infarction      CURRENT MEDICATIONS:  Current Outpatient Medications   Medication Sig Dispense Refill     aspirin EC 81 MG EC tablet Take 1 tablet (81 mg) by mouth daily 90 tablet 3     atorvastatin (LIPITOR) 40 MG tablet Take 1 tablet (40 mg) by mouth daily 30 tablet 3     medical cannabis (Patient's own supply) See Admin Instructions (The purpose of this order is to document that the patient reports taking medical cannabis.  This is not a prescription, and is not used to certify that the patient has a qualifying medical condition.)       metoprolol succinate ER (TOPROL-XL) 25 MG 24 hr tablet Take 1 tablet (25 mg) by mouth daily 90 tablet 3     nitroGLYcerin (NITROSTAT) 0.4 MG sublingual tablet PLACE 1 TAB TO TONGUE EVERY 5 MINS AS NEEDED-CHEST PAIN. CALL 911 AFTER 3 DOSES IF SYMPTOMS PERSIST 25 tablet 3     ALLERGIES   No Known Allergies    PAST MEDICAL, SURGICAL, FAMILY HISTORY:  History was reviewed and updated as needed, see medical record.    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 He was previously  and is now /. He has 2 kids. He is a former smoker and quit within the past week. He uses marijuana fairly regularly. He drinks alcohol occassionally, usually not more than once a week.     Review of Systems:  Skin:  Negative     Eyes:  Positive for glasses  ENT:  Positive for tinnitus;hearing loss  Respiratory:  Negative (occasional with talking and need to take a deep breath)  Cardiovascular:    chest pain;Positive for;palpitations  Gastroenterology: Negative    Genitourinary:  Positive for nocturia  Musculoskeletal:  Positive for joint pain;joint  "stiffness  Neurologic:  Positive for numbness or tingling of hands  Psychiatric:  Negative    Heme/Lymph/Imm:  Negative    Endocrine:  Negative       Physical Exam:  Vitals: /78   Pulse 76   Ht 1.727 m (5' 8\")   Wt 88.4 kg (194 lb 14.4 oz)   BMI 29.63 kg/m      Wt Readings from Last 4 Encounters:   11/06/19 88.4 kg (194 lb 14.4 oz)   09/12/19 85.7 kg (189 lb)   09/09/19 85.3 kg (188 lb)   04/13/18 95.3 kg (210 lb)     GEN: Appears his stated age and in no acute distress.  HEENT:  Normocephalic, atraumatic. Pupils equal, round. Sclerae nonicteric.   NECK: Carotid pulses full and equal bilaterally with no bruit.  C/V:  Regular rate and rhythm, normal S1 and S2, no murmur, rub or gallop. Radial pulses 2+ bilaterally.  RESP: Respirations are unlabored. Lungs are clear to auscultation bilaterally without wheezes, rales, or rhonchi.  EXTREM: Warm. No clubbing, cyanosis, or lower extremity edema.  NEURO: Alert and oriented, cooperative. No gross focal deficits. Gait steady.   SKIN: Warm and dry. No xanthelasma.     Recent Lab Results:  LIPID RESULTS:  Lab Results   Component Value Date    CHOL 160 09/09/2019    HDL 39 (L) 09/09/2019     (H) 09/09/2019    TRIG 54 09/09/2019     LIVER ENZYME RESULTS:  Lab Results   Component Value Date    AST 18 09/09/2019    ALT 32 09/09/2019     CBC RESULTS:  Lab Results   Component Value Date    WBC 8.9 09/09/2019    RBC 4.95 09/09/2019    HGB 14.8 09/09/2019    HCT 44.4 09/09/2019    MCV 90 09/09/2019    MCH 29.9 09/09/2019    MCHC 33.3 09/09/2019    RDW 14.6 09/09/2019     09/09/2019     BMP RESULTS:  Lab Results   Component Value Date     09/09/2019    POTASSIUM 4.3 09/09/2019    CHLORIDE 109 09/09/2019    CO2 22 09/09/2019    ANIONGAP 8 09/09/2019    GLC 96 09/09/2019    BUN 10 09/09/2019    CR 0.69 09/09/2019    GFRESTIMATED >90 09/09/2019    GFRESTBLACK >90 09/09/2019    GABRIELA 9.1 09/09/2019      A1C RESULTS:  Lab Results   Component Value Date    A1C 4.8 " 06/05/2016         This note was completed in part using Dragon voice recognition software. Although reviewed after completion, some word and grammatical errors may occur.      Thank you for allowing me to participate in the care of your patient.    Sincerely,     David Escobedo NP     [facility]

## 2019-11-06 NOTE — PROGRESS NOTES
"Cardiology Clinic Progress Note    Service Date: November 6, 2019    PRIMARY CARDIOLOGIST:  Dr. Kinney      REASON FOR VISIT: Follow up of stress test results    HPI:   I had the pleasure of meeting Mr. Chan \"Cynthia\" Austin in the clinic today. He is a very pleasant 49 year old male. He has previously followed with Dr. Kinney for his cardiology care, but was lost to follow up for a couple of years until this fall. He was recently seen by Dr. Rahman last month for pre-operative clearance for a possible knee surgery in the next couple of months. He has a history of coronary artery disease, initially presenting in June of 2016 with a non-ST elevation myocardial infarction. He had stents placed in his LAD, ramus intermedius, and first obtuse marginal branches at that time. He also had an occluded posterolateral branch of the right coronary artery that filled from left-to-right collaterals. His ejection fraction was 50%-55% post intervention. He also has a history of Joshua-Parkinson-White pattern on EKG diagnosed in 2011. This has remained quiet and has not seem to manifest with any arrhythmias or symptoms.     He had been off of his beta blocker and statin for some time prior to seeing Dr. Rahman. He frankly admits that he does not like taking medications and he did not know if he actually needed to continue them. He did continue to take aspirin 81 mg daily, and he was restarted on atorvastatin 40 mg once daily following the visit. Metoprolol succinate 25 mg daily was also restarted last week. Over the last few months, he has been having left arm pain which feels like a twinging sensation. It sometimes occurs with exertion, but not always. He also has had some dyspnea on exertion. Because of this, Dr. Rahman recommended he complete an echocardiogram and Lexiscan nuclear stress test. The echocardiogram revealed low normal LV systolic function with an ejection fraction estimated at 50-55%. There was grade I diastolic " dysfunction. The nuclear study was done last week on 10/31/19 and showed a medium sized partially reversible inferior and inferoseptal defect consistent with mid-basal inferior scar with small to moderate area of mild mark-infarct ischemia in the distal inferior, mid-basal inferoseptal, and basal inferolateral portion of the left ventricle. A small area of mild ischemia was also noted in the basal anterior portion of the left ventricle.    Today, he presents to the clinic in follow up of these results. He continues to have the same intermittent left arm discomfort and dyspnea on exertion. He denies chest pain. He has not had orthopnea, PND, or lower extremity edema. He has not had palpitations or dizziness. The stress test results were reviewed with Dr. Rahmna who recommended discussing a coronary angiogram. This was discussed with the patient in detail today. He stated that osteoarthritis in his knees bilaterally and associated knee pain has limited his ability to exercise and he is eventually planning to have surgery for this so he can get more active. He is agreeable to postponing this to complete at least 6 months to 1 year of dual antiplatelet therapy uninterrupted should he have any flow limiting lesions warranting stenting. He planned to call his orthopedic physician's office to see if a cortisone injection could possibly be arranged prior to proceeding with the angiogram. Risks and benefits of the coronary angiogram were discussed with the patient today including, bleeding, bruising, infection, allergic reaction, kidney damage (including need for dialysis), stroke, heart attack, vascular damage, emergency open heart surgery, up to and including death. Patient indicates understanding and is agreeable to proceed. He has no known history of an allergy to contrast dye. All questions were answered.    ASSESSMENT AND PLAN:  1. Coronary artery disease, s/p stenting of the LAD, ramus, and OM1 in 2016    Continue  aspirin 81 mg daily, metoprolol succinate 25 mg daily, and high intensity statin.    Plan for a coronary angiogram with possible revascularization given the abnormal stress test and his continued atypical symptoms of left arm discomfort and dyspnea on exertion as noted above.   2. Hypertension, well-controlled  3. Dyslipidemia    LDL cholesterol was elevated at 110 on 9/9/19 as he had not been taking his statin.    He has now been on atorvastatin 40 mg once daily since 9/12/19.    Will repeat a fasting lipid panel and ALT at his follow up visit post angiogram.  4. Tobacco Abuse    He tells me that he quit cold turkey 5 days ago after hearing about his stress test results.    Commended him on quitting and encouraged continued abstinence from smoking.  5. History of Joshua-Parkinson-White Pattern     Asymptomatic without recent concerns for palpitations or dizziness.     Thank you for allowing me to participate in this pleasant patient's care. We will arrange for a coronary angiogram. He will see myself or another Cardiology RADHA in follow up in 1 to 2 weeks following the procedure with a repeat fasting lipid panel and ALT beforehand. He will see Dr. Kinney or Dr. Rahman as planned for a 6 month follow up visit around May of this coming spring. We would be happy to see him sooner if needed for any concerns in the meantime.    ANTELMO Sepulveda, CNP   Text Page  (8am - 5pm, M-F)    Orders this Visit:  Orders Placed This Encounter   Procedures     Lipid panel reflex to direct LDL Fasting     ALT     Follow-Up with Cardiologist     No orders of the defined types were placed in this encounter.    Medications Discontinued During This Encounter   Medication Reason     amoxicillin-clavulanate (AUGMENTIN) 875-125 MG tablet Therapy completed     Encounter Diagnoses   Name Primary?     Coronary artery disease involving native coronary artery of native heart without angina pectoris Yes     Abnormal cardiovascular stress test       "Hyperlipidemia LDL goal <70      Tobacco abuse      Joshua-Parkinson-White (WPW) pattern      Old myocardial infarction      CURRENT MEDICATIONS:  Current Outpatient Medications   Medication Sig Dispense Refill     aspirin EC 81 MG EC tablet Take 1 tablet (81 mg) by mouth daily 90 tablet 3     atorvastatin (LIPITOR) 40 MG tablet Take 1 tablet (40 mg) by mouth daily 30 tablet 3     medical cannabis (Patient's own supply) See Admin Instructions (The purpose of this order is to document that the patient reports taking medical cannabis.  This is not a prescription, and is not used to certify that the patient has a qualifying medical condition.)       metoprolol succinate ER (TOPROL-XL) 25 MG 24 hr tablet Take 1 tablet (25 mg) by mouth daily 90 tablet 3     nitroGLYcerin (NITROSTAT) 0.4 MG sublingual tablet PLACE 1 TAB TO TONGUE EVERY 5 MINS AS NEEDED-CHEST PAIN. CALL 911 AFTER 3 DOSES IF SYMPTOMS PERSIST 25 tablet 3     ALLERGIES   No Known Allergies    PAST MEDICAL, SURGICAL, FAMILY HISTORY:  History was reviewed and updated as needed, see medical record.    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 He was previously  and is now /. He has 2 kids. He is a former smoker and quit within the past week. He uses marijuana fairly regularly. He drinks alcohol occassionally, usually not more than once a week.     Review of Systems:  Skin:  Negative     Eyes:  Positive for glasses  ENT:  Positive for tinnitus;hearing loss  Respiratory:  Negative (occasional with talking and need to take a deep breath)  Cardiovascular:    chest pain;Positive for;palpitations  Gastroenterology: Negative    Genitourinary:  Positive for nocturia  Musculoskeletal:  Positive for joint pain;joint stiffness  Neurologic:  Positive for numbness or tingling of hands  Psychiatric:  Negative    Heme/Lymph/Imm:  Negative    Endocrine:  Negative       Physical Exam:  Vitals: /78   Pulse 76   Ht 1.727 m (5' 8\")   Wt 88.4 kg " (194 lb 14.4 oz)   BMI 29.63 kg/m     Wt Readings from Last 4 Encounters:   11/06/19 88.4 kg (194 lb 14.4 oz)   09/12/19 85.7 kg (189 lb)   09/09/19 85.3 kg (188 lb)   04/13/18 95.3 kg (210 lb)     GEN: Appears his stated age and in no acute distress.  HEENT:  Normocephalic, atraumatic. Pupils equal, round. Sclerae nonicteric.   NECK: Carotid pulses full and equal bilaterally with no bruit.  C/V:  Regular rate and rhythm, normal S1 and S2, no murmur, rub or gallop. Radial pulses 2+ bilaterally.  RESP: Respirations are unlabored. Lungs are clear to auscultation bilaterally without wheezes, rales, or rhonchi.  EXTREM: Warm. No clubbing, cyanosis, or lower extremity edema.  NEURO: Alert and oriented, cooperative. No gross focal deficits. Gait steady.   SKIN: Warm and dry. No xanthelasma.     Recent Lab Results:  LIPID RESULTS:  Lab Results   Component Value Date    CHOL 160 09/09/2019    HDL 39 (L) 09/09/2019     (H) 09/09/2019    TRIG 54 09/09/2019     LIVER ENZYME RESULTS:  Lab Results   Component Value Date    AST 18 09/09/2019    ALT 32 09/09/2019     CBC RESULTS:  Lab Results   Component Value Date    WBC 8.9 09/09/2019    RBC 4.95 09/09/2019    HGB 14.8 09/09/2019    HCT 44.4 09/09/2019    MCV 90 09/09/2019    MCH 29.9 09/09/2019    MCHC 33.3 09/09/2019    RDW 14.6 09/09/2019     09/09/2019     BMP RESULTS:  Lab Results   Component Value Date     09/09/2019    POTASSIUM 4.3 09/09/2019    CHLORIDE 109 09/09/2019    CO2 22 09/09/2019    ANIONGAP 8 09/09/2019    GLC 96 09/09/2019    BUN 10 09/09/2019    CR 0.69 09/09/2019    GFRESTIMATED >90 09/09/2019    GFRESTBLACK >90 09/09/2019    GABRIELA 9.1 09/09/2019      A1C RESULTS:  Lab Results   Component Value Date    A1C 4.8 06/05/2016     CC  Oscar Husain MD  [address] [place] [address]    This note was completed in part using Dragon voice recognition software. Although reviewed after completion, some word and grammatical errors may  occur.

## 2019-11-07 ENCOUNTER — TRANSFERRED RECORDS (OUTPATIENT)
Dept: HEALTH INFORMATION MANAGEMENT | Facility: CLINIC | Age: 49
End: 2019-11-07

## 2019-11-11 ENCOUNTER — CARE COORDINATION (OUTPATIENT)
Dept: CARDIOLOGY | Facility: CLINIC | Age: 49
End: 2019-11-11

## 2019-11-11 DIAGNOSIS — I45.6 WPW (WOLFF-PARKINSON-WHITE SYNDROME): ICD-10-CM

## 2019-11-11 DIAGNOSIS — I25.10 CORONARY ARTERY DISEASE INVOLVING NATIVE CORONARY ARTERY OF NATIVE HEART WITHOUT ANGINA PECTORIS: ICD-10-CM

## 2019-11-11 DIAGNOSIS — I25.2 OLD MYOCARDIAL INFARCTION: ICD-10-CM

## 2019-11-11 RX ORDER — ATORVASTATIN CALCIUM 40 MG/1
40 TABLET, FILM COATED ORAL DAILY
Qty: 90 TABLET | Refills: 3 | Status: SHIPPED | OUTPATIENT
Start: 2019-11-11 | End: 2020-11-09

## 2019-11-11 NOTE — PROGRESS NOTES
Nurse  from [facility] called and left message stating pt wants to know who is doing his heart cath. Pt also wanted to confirm that cath is covered. I left message for pt to let him know that it looks like  is scheduled to do his cath, but that is not always 100 percent guaranteed as there is always that possibility his schedule could change  if he is pulled to an emergency case. I told pt he should call the business office if he has any questions regarding coverage of his cath. I left their number for pt. Alfie CONTRERAS November 11, 2019, 1:55 PM

## 2019-11-14 ENCOUNTER — TELEPHONE (OUTPATIENT)
Dept: CARDIOLOGY | Facility: CLINIC | Age: 49
End: 2019-11-14

## 2019-11-14 NOTE — TELEPHONE ENCOUNTER
Attempted to contact patient to review pre-angiogram procedures for [facility] on 11/19/19. Left message for patient to call back.  Orders were placed by RADHA Volodymyr Escobedo at  on 11/6/19.

## 2019-11-18 RX ORDER — POTASSIUM CHLORIDE 1500 MG/1
20 TABLET, EXTENDED RELEASE ORAL
Status: CANCELLED | OUTPATIENT
Start: 2019-11-18

## 2019-11-18 RX ORDER — SODIUM CHLORIDE 9 MG/ML
INJECTION, SOLUTION INTRAVENOUS CONTINUOUS
Status: CANCELLED | OUTPATIENT
Start: 2019-11-18

## 2019-11-18 RX ORDER — LIDOCAINE 40 MG/G
CREAM TOPICAL
Status: CANCELLED | OUTPATIENT
Start: 2019-11-18

## 2019-11-18 NOTE — TELEPHONE ENCOUNTER
Contacted patient to review pre-cath procedures: patient is scheduled for coronary angiogram (left)  on 11/19/19 . Patient's arrival time is 0800 and procedure time is 1000. Patient is aware to be NPO except for medications. Patient has arranged for transportation and 24 hour f/u care. Patient has no known contract dye allergy. Patient is not diabetic. Patient is not taking anti-coagulation medication. Patient's renal function is WNL for procedure. Patient will continue daily aspirin dose 81.

## 2019-11-19 ENCOUNTER — HOSPITAL ENCOUNTER (OUTPATIENT)
Facility: CLINIC | Age: 49
Discharge: HOME | End: 2019-11-19
Admitting: INTERNAL MEDICINE
Payer: COMMERCIAL

## 2019-11-19 ENCOUNTER — SURGERY (OUTPATIENT)
Age: 49
End: 2019-11-19
Payer: COMMERCIAL

## 2019-11-19 VITALS
TEMPERATURE: 98.1 F | SYSTOLIC BLOOD PRESSURE: 114 MMHG | RESPIRATION RATE: 16 BRPM | BODY MASS INDEX: 28.96 KG/M2 | HEART RATE: 64 BPM | HEIGHT: 68 IN | DIASTOLIC BLOOD PRESSURE: 79 MMHG | WEIGHT: 191.1 LBS | OXYGEN SATURATION: 94 %

## 2019-11-19 DIAGNOSIS — I25.10 CORONARY ARTERY DISEASE INVOLVING NATIVE CORONARY ARTERY OF NATIVE HEART WITHOUT ANGINA PECTORIS: ICD-10-CM

## 2019-11-19 DIAGNOSIS — R94.39 ABNORMAL CARDIOVASCULAR STRESS TEST: ICD-10-CM

## 2019-11-19 PROBLEM — Z98.890 STATUS POST CORONARY ANGIOGRAM: Status: ACTIVE | Noted: 2019-11-19

## 2019-11-19 LAB
ALT SERPL W P-5'-P-CCNC: 26 U/L (ref 0–70)
ANION GAP SERPL CALCULATED.3IONS-SCNC: 5 MMOL/L (ref 3–14)
APTT PPP: 28 SEC (ref 22–37)
BUN SERPL-MCNC: 19 MG/DL (ref 7–30)
CALCIUM SERPL-MCNC: 8.7 MG/DL (ref 8.5–10.1)
CHLORIDE SERPL-SCNC: 111 MMOL/L (ref 94–109)
CHOLEST SERPL-MCNC: 129 MG/DL
CO2 SERPL-SCNC: 24 MMOL/L (ref 20–32)
CREAT SERPL-MCNC: 0.73 MG/DL (ref 0.66–1.25)
ERYTHROCYTE [DISTWIDTH] IN BLOOD BY AUTOMATED COUNT: 14.6 % (ref 10–15)
GFR SERPL CREATININE-BSD FRML MDRD: >90 ML/MIN/{1.73_M2}
GLUCOSE SERPL-MCNC: 98 MG/DL (ref 70–99)
HCT VFR BLD AUTO: 42.5 % (ref 40–53)
HDLC SERPL-MCNC: 51 MG/DL
HGB BLD-MCNC: 14.2 G/DL (ref 13.3–17.7)
INR PPP: 1.01 (ref 0.86–1.14)
LDLC SERPL CALC-MCNC: 68 MG/DL
MCH RBC QN AUTO: 30.4 PG (ref 26.5–33)
MCHC RBC AUTO-ENTMCNC: 33.4 G/DL (ref 31.5–36.5)
MCV RBC AUTO: 91 FL (ref 78–100)
NONHDLC SERPL-MCNC: 78 MG/DL
PLATELET # BLD AUTO: 281 10E9/L (ref 150–450)
POTASSIUM SERPL-SCNC: 4 MMOL/L (ref 3.4–5.3)
RBC # BLD AUTO: 4.67 10E12/L (ref 4.4–5.9)
SODIUM SERPL-SCNC: 140 MMOL/L (ref 133–144)
TRIGL SERPL-MCNC: 48 MG/DL
WBC # BLD AUTO: 9.6 10E9/L (ref 4–11)

## 2019-11-19 PROCEDURE — 40000235 ZZH STATISTIC TELEMETRY

## 2019-11-19 PROCEDURE — 80048 BASIC METABOLIC PNL TOTAL CA: CPT | Performed by: NURSE PRACTITIONER

## 2019-11-19 PROCEDURE — 25000128 H RX IP 250 OP 636: Performed by: INTERNAL MEDICINE

## 2019-11-19 PROCEDURE — 25000125 ZZHC RX 250: Performed by: INTERNAL MEDICINE

## 2019-11-19 PROCEDURE — 40000852 ZZH STATISTIC HEART CATH LAB OR EP LAB

## 2019-11-19 PROCEDURE — 99152 MOD SED SAME PHYS/QHP 5/>YRS: CPT | Performed by: INTERNAL MEDICINE

## 2019-11-19 PROCEDURE — 93005 ELECTROCARDIOGRAM TRACING: CPT

## 2019-11-19 PROCEDURE — 93010 ELECTROCARDIOGRAM REPORT: CPT | Performed by: INTERNAL MEDICINE

## 2019-11-19 PROCEDURE — 84460 ALANINE AMINO (ALT) (SGPT): CPT

## 2019-11-19 PROCEDURE — 85027 COMPLETE CBC AUTOMATED: CPT | Performed by: NURSE PRACTITIONER

## 2019-11-19 PROCEDURE — C1769 GUIDE WIRE: HCPCS | Performed by: INTERNAL MEDICINE

## 2019-11-19 PROCEDURE — 36415 COLL VENOUS BLD VENIPUNCTURE: CPT

## 2019-11-19 PROCEDURE — C1894 INTRO/SHEATH, NON-LASER: HCPCS | Performed by: INTERNAL MEDICINE

## 2019-11-19 PROCEDURE — 93454 CORONARY ARTERY ANGIO S&I: CPT | Mod: 26 | Performed by: INTERNAL MEDICINE

## 2019-11-19 PROCEDURE — 84460 ALANINE AMINO (ALT) (SGPT): CPT | Performed by: NURSE PRACTITIONER

## 2019-11-19 PROCEDURE — 40000065 ZZH STATISTIC EKG NON-CHARGEABLE

## 2019-11-19 PROCEDURE — 80061 LIPID PANEL: CPT | Performed by: NURSE PRACTITIONER

## 2019-11-19 PROCEDURE — 85730 THROMBOPLASTIN TIME PARTIAL: CPT | Performed by: NURSE PRACTITIONER

## 2019-11-19 PROCEDURE — 27210794 ZZH OR GENERAL SUPPLY STERILE: Performed by: INTERNAL MEDICINE

## 2019-11-19 PROCEDURE — 85610 PROTHROMBIN TIME: CPT | Performed by: NURSE PRACTITIONER

## 2019-11-19 PROCEDURE — 93454 CORONARY ARTERY ANGIO S&I: CPT | Performed by: INTERNAL MEDICINE

## 2019-11-19 PROCEDURE — 25800030 ZZH RX IP 258 OP 636: Performed by: NURSE PRACTITIONER

## 2019-11-19 RX ORDER — POTASSIUM CHLORIDE 1500 MG/1
20 TABLET, EXTENDED RELEASE ORAL
Status: DISCONTINUED | OUTPATIENT
Start: 2019-11-19 | End: 2019-11-19 | Stop reason: HOSPADM

## 2019-11-19 RX ORDER — ATROPINE SULFATE 0.1 MG/ML
0.5 INJECTION INTRAVENOUS EVERY 5 MIN PRN
Status: DISCONTINUED | OUTPATIENT
Start: 2019-11-19 | End: 2019-11-19

## 2019-11-19 RX ORDER — DOPAMINE HYDROCHLORIDE 160 MG/100ML
2-20 INJECTION, SOLUTION INTRAVENOUS CONTINUOUS PRN
Status: DISCONTINUED | OUTPATIENT
Start: 2019-11-19 | End: 2019-11-19 | Stop reason: HOSPADM

## 2019-11-19 RX ORDER — SODIUM CHLORIDE 9 MG/ML
INJECTION, SOLUTION INTRAVENOUS CONTINUOUS
Status: DISCONTINUED | OUTPATIENT
Start: 2019-11-19 | End: 2019-11-19 | Stop reason: HOSPADM

## 2019-11-19 RX ORDER — PHENYLEPHRINE HCL IN 0.9% NACL 50MG/250ML
.5-6 PLASTIC BAG, INJECTION (ML) INTRAVENOUS CONTINUOUS PRN
Status: DISCONTINUED | OUTPATIENT
Start: 2019-11-19 | End: 2019-11-19 | Stop reason: HOSPADM

## 2019-11-19 RX ORDER — EPTIFIBATIDE 2 MG/ML
180 INJECTION, SOLUTION INTRAVENOUS EVERY 10 MIN PRN
Status: DISCONTINUED | OUTPATIENT
Start: 2019-11-19 | End: 2019-11-19 | Stop reason: HOSPADM

## 2019-11-19 RX ORDER — FLUMAZENIL 0.1 MG/ML
0.2 INJECTION, SOLUTION INTRAVENOUS
Status: DISCONTINUED | OUTPATIENT
Start: 2019-11-19 | End: 2019-11-19 | Stop reason: HOSPADM

## 2019-11-19 RX ORDER — FENTANYL CITRATE 50 UG/ML
25-50 INJECTION, SOLUTION INTRAMUSCULAR; INTRAVENOUS
Status: DISCONTINUED | OUTPATIENT
Start: 2019-11-19 | End: 2019-11-19

## 2019-11-19 RX ORDER — LIDOCAINE 40 MG/G
CREAM TOPICAL
Status: DISCONTINUED | OUTPATIENT
Start: 2019-11-19 | End: 2019-11-19 | Stop reason: HOSPADM

## 2019-11-19 RX ORDER — DOBUTAMINE HYDROCHLORIDE 200 MG/100ML
2-20 INJECTION INTRAVENOUS CONTINUOUS PRN
Status: DISCONTINUED | OUTPATIENT
Start: 2019-11-19 | End: 2019-11-19 | Stop reason: HOSPADM

## 2019-11-19 RX ORDER — EPTIFIBATIDE 2 MG/ML
2 INJECTION, SOLUTION INTRAVENOUS CONTINUOUS PRN
Status: DISCONTINUED | OUTPATIENT
Start: 2019-11-19 | End: 2019-11-19 | Stop reason: HOSPADM

## 2019-11-19 RX ORDER — ARGATROBAN 1 MG/ML
150 INJECTION, SOLUTION INTRAVENOUS
Status: DISCONTINUED | OUTPATIENT
Start: 2019-11-19 | End: 2019-11-19 | Stop reason: HOSPADM

## 2019-11-19 RX ORDER — NALOXONE HYDROCHLORIDE 0.4 MG/ML
.2-.4 INJECTION, SOLUTION INTRAMUSCULAR; INTRAVENOUS; SUBCUTANEOUS
Status: DISCONTINUED | OUTPATIENT
Start: 2019-11-19 | End: 2019-11-19 | Stop reason: HOSPADM

## 2019-11-19 RX ORDER — ARGATROBAN 1 MG/ML
350 INJECTION, SOLUTION INTRAVENOUS
Status: DISCONTINUED | OUTPATIENT
Start: 2019-11-19 | End: 2019-11-19 | Stop reason: HOSPADM

## 2019-11-19 RX ORDER — IOPAMIDOL 755 MG/ML
INJECTION, SOLUTION INTRAVASCULAR
Status: DISCONTINUED | OUTPATIENT
Start: 2019-11-19 | End: 2019-11-19 | Stop reason: HOSPADM

## 2019-11-19 RX ORDER — NOREPINEPHRINE BITARTRATE 0.06 MG/ML
.03-.4 INJECTION, SOLUTION INTRAVENOUS CONTINUOUS PRN
Status: DISCONTINUED | OUTPATIENT
Start: 2019-11-19 | End: 2019-11-19 | Stop reason: HOSPADM

## 2019-11-19 RX ORDER — FENTANYL CITRATE 50 UG/ML
INJECTION, SOLUTION INTRAMUSCULAR; INTRAVENOUS
Status: DISCONTINUED | OUTPATIENT
Start: 2019-11-19 | End: 2019-11-19 | Stop reason: HOSPADM

## 2019-11-19 RX ORDER — HEPARIN SODIUM 1000 [USP'U]/ML
INJECTION, SOLUTION INTRAVENOUS; SUBCUTANEOUS
Status: DISCONTINUED | OUTPATIENT
Start: 2019-11-19 | End: 2019-11-19 | Stop reason: HOSPADM

## 2019-11-19 RX ORDER — HEPARIN SODIUM 10000 [USP'U]/100ML
100-1000 INJECTION, SOLUTION INTRAVENOUS CONTINUOUS PRN
Status: DISCONTINUED | OUTPATIENT
Start: 2019-11-19 | End: 2019-11-19 | Stop reason: HOSPADM

## 2019-11-19 RX ORDER — NALOXONE HYDROCHLORIDE 0.4 MG/ML
.1-.4 INJECTION, SOLUTION INTRAMUSCULAR; INTRAVENOUS; SUBCUTANEOUS
Status: DISCONTINUED | OUTPATIENT
Start: 2019-11-19 | End: 2019-11-19 | Stop reason: HOSPADM

## 2019-11-19 RX ORDER — NITROGLYCERIN 20 MG/100ML
.07-2 INJECTION INTRAVENOUS CONTINUOUS PRN
Status: DISCONTINUED | OUTPATIENT
Start: 2019-11-19 | End: 2019-11-19 | Stop reason: HOSPADM

## 2019-11-19 RX ORDER — ACETAMINOPHEN 325 MG/1
650 TABLET ORAL EVERY 4 HOURS PRN
Status: DISCONTINUED | OUTPATIENT
Start: 2019-11-19 | End: 2019-11-19 | Stop reason: HOSPADM

## 2019-11-19 RX ADMIN — IOPAMIDOL 25 ML: 755 INJECTION, SOLUTION INTRAVENOUS at 11:32

## 2019-11-19 RX ADMIN — MIDAZOLAM 1 MG: 1 INJECTION INTRAMUSCULAR; INTRAVENOUS at 11:24

## 2019-11-19 RX ADMIN — FENTANYL CITRATE 25 MCG: 50 INJECTION, SOLUTION INTRAMUSCULAR; INTRAVENOUS at 11:27

## 2019-11-19 RX ADMIN — HEPARIN SODIUM 5000 UNITS: 1000 INJECTION, SOLUTION INTRAVENOUS; SUBCUTANEOUS at 11:21

## 2019-11-19 RX ADMIN — SODIUM CHLORIDE: 9 INJECTION, SOLUTION INTRAVENOUS at 08:55

## 2019-11-19 RX ADMIN — LIDOCAINE HYDROCHLORIDE 2 ML: 10 INJECTION, SOLUTION EPIDURAL; INFILTRATION; INTRACAUDAL; PERINEURAL at 11:19

## 2019-11-19 RX ADMIN — MIDAZOLAM 1 MG: 1 INJECTION INTRAMUSCULAR; INTRAVENOUS at 11:13

## 2019-11-19 RX ADMIN — FENTANYL CITRATE 50 MCG: 50 INJECTION, SOLUTION INTRAMUSCULAR; INTRAVENOUS at 11:12

## 2019-11-19 RX ADMIN — FENTANYL CITRATE 50 MCG: 50 INJECTION, SOLUTION INTRAMUSCULAR; INTRAVENOUS at 11:24

## 2019-11-19 RX ADMIN — FENTANYL CITRATE 50 MCG: 50 INJECTION, SOLUTION INTRAMUSCULAR; INTRAVENOUS at 11:18

## 2019-11-19 RX ADMIN — MIDAZOLAM 0.5 MG: 1 INJECTION INTRAMUSCULAR; INTRAVENOUS at 11:28

## 2019-11-19 RX ADMIN — MIDAZOLAM 1 MG: 1 INJECTION INTRAMUSCULAR; INTRAVENOUS at 11:18

## 2019-11-19 ASSESSMENT — MIFFLIN-ST. JEOR: SCORE: 1716.32

## 2019-11-19 NOTE — PROGRESS NOTES
Care Suites Discharge Nursing Note    1515  Patient ambulated without difficulty  1530 and 1545  Left radial site remains stable.  Education/questions answered:  yes  Patient DC location:  To home  Accompanied by:  MJ/  CS discharge time: 1550

## 2019-11-19 NOTE — PROGRESS NOTES
Care Suites Admission Nursing Note    Reason for admission: left heart cath  CS arrival time: 0800  Accompanied by: self  Name/phone of DC : roommate  [phone] will call when ready to go home  Medications held: none  Consent signed: yes  Abnormal assessment/labs: none  If abnormal, provider notified: na  Education/questions answered: yes  Plan: left heart cath discharge to home

## 2019-11-19 NOTE — PROGRESS NOTES
Has had no c/o post heart cath.  Ate.  Up to BR to voide AVS reviewed with verbalized understanding.  Roommate will come to take home.  Call out to Dr Bro about 1330 as pt would like to talk with him and  stated he would come to see pt.

## 2019-11-19 NOTE — DISCHARGE INSTRUCTIONS
Cardiac Angiogram Discharge Instructions - Radial    After you go home:      Have an adult stay with you until tomorrow.    Drink extra fluids for 2 days.    You may resume your normal diet.    No smoking       For 24 hours - due to the sedation you received:    Relax and take it easy.    Do NOT make any important or legal decisions.    Do NOT drive or operate machines at home or at work.    Do NOT drink alcohol.    Care of Wrist Puncture Site:      For the first 24 hrs - check the puncture site every 1-2 hours while awake.    It is normal to have soreness at the puncture site and mild tingling in your hand for up to 3 days.    Remove the bandaid after 24 hours. If there is minor oozing, apply another bandaid and remove it after 12 hours.    You may shower tomorrow.  Do NOT take a bath, or use a hot tub or pool for at least 3 days. Do NOT scrub the site. Do not use lotion or powder near the puncture site.           Activity:        For 2 days:     do not use your hand or arm to support your weight (such as rising from a chair)     do not bend your wrist (such as lifting a garage door).    do not lift more than 5 pounds or exercise your arm (such as tennis, golf or bowling).    Do NOT do any heavy activity such as exercise, lifting, or straining.     Bleeding:      If you start bleeding from the site in your wrist, sit down and press firmly on/above the site for 10 minutes.     Once bleeding stops, keep arm still for 2 hours.     Call [facility] as soon as you can.       Call 911 right away if you have heavy bleeding or bleeding that does not stop.      Medicines:      Take your medications, including blood thinners, unless your provider tells you not to.     If you have stopped any medicines, check with your provider about when to restart them.    Follow Up Appointments:      Follow up with [facility] Nurse Practitioner at [facility] of patient preference in 7-10 days.    Call the clinic if:      You have a  large or growing hard lump around the site.    The site is red, swollen, hot or tender.    Blood or fluid is draining from the site.    You have chills or a fever greater than 101 F (38 C).    Your arm feels numb, cool or changes color.    You have hives, a rash or unusual itching.    Any questions or concerns.          [facility] [place]:    [phone] [facility] (7 days a week)

## 2019-11-19 NOTE — PRE-PROCEDURE
GENERAL PRE-PROCEDURE:   Procedure:  Coronary angiogram  Date/Time:  11/19/2019 11:08 AM    Verbal consent obtained?: Yes    Written consent obtained?: Yes    Risks and benefits: Risks, benefits and alternatives were discussed    Consent given by:  Patient  Patient states understanding of procedure being performed: Yes    Patient's understanding of procedure matches consent: Yes    Procedure consent matches procedure scheduled: Yes    Appropriately NPO:  Yes  ASA Class:  Class 2- mild systemic disease, no acute problems, no functional limitations  Mallampati  :  Grade 2- soft palate, base of uvula, tonsillar pillars, and portion of posterior pharyngeal wall visible  Lungs:  Lungs clear with good breath sounds bilaterally  Heart:  Normal heart sounds and rate  History & Physical reviewed:  History and physical reviewed and no updates needed  Statement of review:  I have reviewed the lab findings, diagnostic data, medications, and the plan for sedation

## 2019-11-19 NOTE — PROGRESS NOTES
1110 Report received from Aparna Canales RN.  1145 Pt returned from Cath Lab. Drowsy, but responds appropriately to verbal stimuli. TR band intact to left wrist.  No oozing or hematoma noted. Area soft & flat. Left arm elevated on pillows. Pt denies pain. Pt instructed on activity restrictions with left wrist. Verbal understanding received from pt. No family present.  1210 Report given to Aparna Canales RN.

## 2019-11-21 LAB — INTERPRETATION ECG - MUSE: NORMAL

## 2019-11-21 NOTE — PROGRESS NOTES
Cardiology Clinic Progress Note    Service Date: December 4, 2019    PRIMARY CARDIOLOGIST: Dr. Kinney       REASON FOR VISIT: Follow up post coronary angiogram     HPI:   Mr. Dustin Avila is a very pleasant 49 year old male. He has previously followed with Dr. Kinney for his cardiology care, but was lost to follow up for a couple of years until this fall. He was recently seen by Dr. Rahman last month for pre-operative clearance for a possible knee surgery in the next couple of months. He has a history of coronary artery disease, initially presenting in June of 2016 with a non-ST elevation myocardial infarction. He had stents placed in his LAD, ramus intermedius, and first obtuse marginal branches at that time. He also had an occluded posterolateral branch of the right coronary artery that filled from left-to-right collaterals. His ejection fraction was 50%-55% post intervention. He also has a history of Joshua-Parkinson-White pattern on EKG diagnosed in 2011. This has since remained quiet without any documented evidence of arrhythmias.     He had been off of his beta blocker and statin for some time prior to seeing Dr. Rahman. He frankly admits that he does not like taking medications and he did not know if he actually needed to continue them. He did continue to take aspirin 81 mg daily, and he was restarted on atorvastatin 40 mg once daily following their visit. This fall, he had been having some left arm pain which felt like a twinging sensation. It sometimes occurred with exertion, but not always. He also has had some dyspnea on exertion. Because of this, Dr. Rahman recommended he complete an echocardiogram and Lexiscan nuclear stress test. The echocardiogram revealed low normal LV systolic function with an ejection fraction estimated at 50-55%. There was grade I diastolic dysfunction. The nuclear study was done last on 10/31/19 and showed a medium sized partially reversible inferior and inferoseptal defect  consistent with mid-basal inferior scar with small to moderate area of mild mark-infarct ischemia in the distal inferior, mid-basal inferoseptal, and basal inferolateral portion of the left ventricle. A small area of mild ischemia was also noted in the basal anterior portion of the left ventricle. Metoprolol succinate 25 mg daily was restarted following the stress test.      He underwent coronary angiography on 11/19/2019 which revealed single-vessel occlusive coronary artery disease with a chronic total occlusion of the right posterior lateral branch with left-to-right collateral filling. The previously placed circumflex, ramus intermediate, and LAD stents were all patent. There was no significant change in comparison to his prior angiogram in 2016.    Today, he presents to the clinic in follow up of his coronary angiogram. He tells me he has been feeling generally well since the procedure. He did have a couple of brief, intermittent episodes of palpitations for the first two days following the procedure. He described this as a fluttering sensation in his left chest. It would last for about 5 minutes and come and go spontaneously. This has not recurred for about the past 2 weeks. He otherwise denies chest pain, shortness of breath, orthopnea, PND, or lower extremity edema. He recently got a Hungarian Campbell puppy and he is excited to get into a routine of walking the dog to get him more active. He has tolerated chasing the puppy around his backyard without dyspnea on exertion or chest discomfort. He has been quite limited by chronic knee pain from osteoarthritis and is hoping to have surgery sometime in the next year to improve this.      ASSESSMENT AND PLAN:  1. Coronary artery disease, s/p stenting of the LAD, ramus, and OM1 in 2016    Denies recurrent chest pain or shortness of breath.     Coronary angiogram on 11/19/19 as noted above was unchanged from 2016.      Continue aspirin 81 mg daily, metoprolol succinate  25 mg daily, and high intensity statin.  2. Hypertension, well-controlled  3. Dyslipidemia    LDL cholesterol improved to 68 on 11/19/19.     Continue on atorvastatin 40 mg once daily.  4. History of tobacco abuse  5. History of Joshua-Parkinson-White Pattern     He did have some brief episodes of palpitations following his angiogram, which have not since recurred.     Recommended he call if he develops any recurrent palpitations and would recommend an event monitor at that time. Reviewed to call 911 or report to the ER with any prolonged episode of palpitations with associated dizziness or presyncope.      Thank you for allowing me to participate in this pleasant patient's care. He will see Dr. Kinney in follow up visit around April or May of this coming spring. I encouraged him to call with any concerns in the meantime, particularly any recurrent palpitations, chest pain, or shortness of breath, and we would be happy to see him sooner if needed.      ANTELMO Sepulveda, CNP   Text Page  (8am - 5pm, M-F)    Orders this Visit:  Orders Placed This Encounter   Procedures     Follow-Up with Cardiologist     No orders of the defined types were placed in this encounter.    There are no discontinued medications.    Encounter Diagnoses   Name Primary?     Coronary artery disease involving native coronary artery of native heart without angina pectoris Yes     Hyperlipidemia LDL goal <70      CURRENT MEDICATIONS:  Current Outpatient Medications   Medication Sig Dispense Refill     aspirin EC 81 MG EC tablet Take 1 tablet (81 mg) by mouth daily 90 tablet 3     atorvastatin (LIPITOR) 40 MG tablet Take 1 tablet (40 mg) by mouth daily 90 tablet 3     medical cannabis (Patient's own supply) See Admin Instructions (The purpose of this order is to document that the patient reports taking medical cannabis.  This is not a prescription, and is not used to certify that the patient has a qualifying medical condition.)       metoprolol  "succinate ER (TOPROL-XL) 25 MG 24 hr tablet Take 1 tablet (25 mg) by mouth daily 90 tablet 3     nitroGLYcerin (NITROSTAT) 0.4 MG sublingual tablet PLACE 1 TAB TO TONGUE EVERY 5 MINS AS NEEDED-CHEST PAIN. CALL 911 AFTER 3 DOSES IF SYMPTOMS PERSIST 25 tablet 3     ALLERGIES   No Known Allergies    PAST MEDICAL, SURGICAL, FAMILY HISTORY:  History was reviewed and updated as needed, see medical record.    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 He was previously  and is now /. He has 2 kids. He is a former smoker and quit recently this fall. He uses marijuana fairly regularly. He drinks alcohol occassionally, usually not more than once a week.     Review of Systems:  Skin:  Negative     Eyes:  Positive for glasses  ENT:  Positive for tinnitus;hearing loss  Respiratory:  Negative (occasional with talking and need to take a deep breath)  Cardiovascular:    chest pain;Positive for;palpitations  Gastroenterology: Negative    Genitourinary:  Positive for nocturia  Musculoskeletal:  Positive for joint pain;joint stiffness  Neurologic:  Positive for numbness or tingling of hands  Psychiatric:  Negative    Heme/Lymph/Imm:  Negative    Endocrine:  Negative       Physical Exam:  Vitals: /77   Pulse 78   Ht 1.727 m (5' 8\")   Wt 88 kg (194 lb)   BMI 29.50 kg/m     Wt Readings from Last 4 Encounters:   12/04/19 88 kg (194 lb)   11/19/19 86.7 kg (191 lb 1.6 oz)   11/06/19 88.4 kg (194 lb 14.4 oz)   09/12/19 85.7 kg (189 lb)     GEN: Appears his stated age and in no acute distress.  HEENT:  Normocephalic, atraumatic. Pupils equal, round. Sclerae nonicteric.   NECK: Carotid pulses full and equal bilaterally with no bruit.  C/V:  Regular rate and rhythm, normal S1 and S2, no murmur, rub or gallop. Radial pulses 2+ bilaterally.  RESP: Respirations are unlabored. Lungs are clear to auscultation bilaterally without wheezes, rales, or rhonchi.  EXTREM: Warm. No clubbing, cyanosis, or lower extremity " edema.  NEURO: Alert and oriented, cooperative. No gross focal deficits. Gait steady.   PSYCH: Affect and mood appropriate.  SKIN: Warm and dry. The left radial site is soft with no ecchymosis or erythema.    Recent Lab Results:  LIPID RESULTS:  Lab Results   Component Value Date    CHOL 129 11/19/2019    HDL 51 11/19/2019    LDL 68 11/19/2019    TRIG 48 11/19/2019     LIVER ENZYME RESULTS:  Lab Results   Component Value Date    AST 18 09/09/2019    ALT 26 11/19/2019     CBC RESULTS:  Lab Results   Component Value Date    WBC 9.6 11/19/2019    RBC 4.67 11/19/2019    HGB 14.2 11/19/2019    HCT 42.5 11/19/2019    MCV 91 11/19/2019    MCH 30.4 11/19/2019    MCHC 33.4 11/19/2019    RDW 14.6 11/19/2019     11/19/2019     BMP RESULTS:  Lab Results   Component Value Date     11/19/2019    POTASSIUM 4.0 11/19/2019    CHLORIDE 111 (H) 11/19/2019    CO2 24 11/19/2019    ANIONGAP 5 11/19/2019    GLC 98 11/19/2019    BUN 19 11/19/2019    CR 0.73 11/19/2019    GFRESTIMATED >90 11/19/2019    GFRESTBLACK >90 11/19/2019    GABRIELA 8.7 11/19/2019      A1C RESULTS:  Lab Results   Component Value Date    A1C 4.8 06/05/2016     INR RESULTS:  Lab Results   Component Value Date    INR 1.01 11/19/2019    INR 1.55 (H) 09/27/2010     CC  Oscar Husain MD  [address] [place] [address]    This note was completed in part using Dragon voice recognition software. Although reviewed after completion, some word and grammatical errors may occur.

## 2019-12-04 ENCOUNTER — OFFICE VISIT (OUTPATIENT)
Dept: CARDIOLOGY | Facility: CLINIC | Age: 49
End: 2019-12-04
Payer: COMMERCIAL

## 2019-12-04 VITALS
BODY MASS INDEX: 29.4 KG/M2 | SYSTOLIC BLOOD PRESSURE: 120 MMHG | HEIGHT: 68 IN | HEART RATE: 78 BPM | WEIGHT: 194 LBS | DIASTOLIC BLOOD PRESSURE: 77 MMHG

## 2019-12-04 DIAGNOSIS — I25.10 CORONARY ARTERY DISEASE INVOLVING NATIVE CORONARY ARTERY OF NATIVE HEART WITHOUT ANGINA PECTORIS: Primary | ICD-10-CM

## 2019-12-04 DIAGNOSIS — E78.5 HYPERLIPIDEMIA LDL GOAL <70: ICD-10-CM

## 2019-12-04 PROCEDURE — 99214 OFFICE O/P EST MOD 30 MIN: CPT | Performed by: NURSE PRACTITIONER

## 2019-12-04 ASSESSMENT — MIFFLIN-ST. JEOR: SCORE: 1729.48

## 2019-12-04 NOTE — LETTER
12/4/2019    Oscar Husain MD  [address]  [place] [address]    RE: Dustin Snydero       Dear Colleague,    I had the pleasure of seeing Dustin Avila in the [facility].    Cardiology Clinic Progress Note    Service Date: December 4, 2019    PRIMARY CARDIOLOGIST: Dr. Kinney       REASON FOR VISIT: Follow up post coronary angiogram     HPI:   Mr. Dustin Avila is a very pleasant 49 year old male. He has previously followed with Dr. Kinney for his cardiology care, but was lost to follow up for a couple of years until this fall. He was recently seen by Dr. Rahman last month for pre-operative clearance for a possible knee surgery in the next couple of months. He has a history of coronary artery disease, initially presenting in June of 2016 with a non-ST elevation myocardial infarction. He had stents placed in his LAD, ramus intermedius, and first obtuse marginal branches at that time. He also had an occluded posterolateral branch of the right coronary artery that filled from left-to-right collaterals. His ejection fraction was 50%-55% post intervention. He also has a history of Joshua-Parkinson-White pattern on EKG diagnosed in 2011. This has since remained quiet without any documented evidence of arrhythmias.     He had been off of his beta blocker and statin for some time prior to seeing Dr. Rahman. He frankly admits that he does not like taking medications and he did not know if he actually needed to continue them. He did continue to take aspirin 81 mg daily, and he was restarted on atorvastatin 40 mg once daily following their visit. This fall, he had been having some left arm pain which felt like a twinging sensation. It sometimes occurred with exertion, but not always. He also has had some dyspnea on exertion. Because of this, Dr. Rahman recommended he complete an echocardiogram and Lexiscan nuclear stress test. The echocardiogram revealed low normal LV systolic  function with an ejection fraction estimated at 50-55%. There was grade I diastolic dysfunction. The nuclear study was done last on 10/31/19 and showed a medium sized partially reversible inferior and inferoseptal defect consistent with mid-basal inferior scar with small to moderate area of mild mark-infarct ischemia in the distal inferior, mid-basal inferoseptal, and basal inferolateral portion of the left ventricle. A small area of mild ischemia was also noted in the basal anterior portion of the left ventricle. Metoprolol succinate 25 mg daily was restarted  following the stress test.      He underwent coronary angiography on 11/19/2019 which revealed single-vessel occlusive coronary artery disease with a chronic total occlusion of the right posterior lateral branch with left-to-right collateral filling. The previously placed circumflex, ramus intermediate, and LAD stents were all patent. There was no significant change in comparison to his prior angiogram in 2016.    Today, he presents to the clinic in follow up of his coronary angiogram. He  tells me he has been feeling generally well since the procedure. He did have a couple of brief, intermittent episodes of palpitations for the first two days following the procedure. He described this as a fluttering sensation in his left chest. It would last for about 5 minutes and come and go spontaneously. This has not recurred for about the past 2 weeks. He otherwise denies chest pain, shortness of breath, orthopnea, PND, or lower extremity edema.  He recently got a Persian Campbell puppy and he is excited to get into a routine of walking the dog to get him more active. He has tolerated chasing the puppy around his backyard without dyspnea on exertion or chest discomfort. He has been quite limited by chronic knee pain from osteoarthritis and is hoping to have surgery sometime in the next year to improve this.      ASSESSMENT AND PLAN:  1. Coronary artery disease, s/p  stenting of the LAD, ramus, and OM1 in 2016    Denies recurrent chest pain or shortness of breath.     Coronary angiogram on 11/19/19 as noted above was unchanged from 2016.      Continue aspirin 81 mg daily, metoprolol succinate 25 mg daily, and high intensity statin.  2. Hypertension, well-controlled  3. Dyslipidemia    LDL cholesterol improved to 68 on 11/19/19.     Continue on atorvastatin 40 mg once daily.  4. History of tobacco abuse  5. History of Joshua-Parkinson-White Pattern      He did have some brief episodes of palpitations following his angiogram, which have not since recurred.     Recommended he call if he develops any recurrent palpitations and would recommend an event monitor at that time. Reviewed to call 911 or report to the ER with any prolonged episode of palpitations with associated dizziness or presyncope.      Thank you for allowing me to participate in this pleasant patient's care. He will see Dr. Kinney in follow up visit around April or May of this coming spring.  I encouraged him to call with any concerns in the meantime, particularly any recurrent palpitations, chest pain, or shortness of breath, and we would be happy to see him sooner if needed.      ANTELMO Sepulveda, CNP   Text Page  (8am - 5pm, M-F)    Orders this Visit:  Orders Placed This Encounter   Procedures     Follow-Up with Cardiologist     No orders of the defined types were placed in this encounter.    There are no discontinued medications.    Encounter Diagnoses   Name Primary?     Coronary artery disease involving native coronary artery of native heart without angina pectoris Yes     Hyperlipidemia LDL goal <70      CURRENT MEDICATIONS:  Current Outpatient Medications   Medication Sig Dispense Refill     aspirin EC 81 MG EC tablet Take 1 tablet (81 mg) by mouth daily 90 tablet 3     atorvastatin (LIPITOR) 40 MG tablet Take 1 tablet (40 mg) by mouth daily 90 tablet 3     medical cannabis (Patient's own supply) See Admin  "Instructions (The purpose of this order is to document that the patient reports taking medical cannabis.  This is not a prescription, and is not used to certify that the patient has a qualifying medical condition.)       metoprolol succinate ER (TOPROL-XL) 25 MG 24 hr tablet Take 1 tablet (25 mg) by mouth daily 90 tablet 3     nitroGLYcerin (NITROSTAT) 0.4 MG sublingual tablet PLACE 1 TAB TO TONGUE EVERY 5 MINS AS NEEDED-CHEST PAIN. CALL 911 AFTER 3 DOSES IF SYMPTOMS PERSIST 25 tablet 3     ALLERGIES   No Known Allergies    PAST MEDICAL, SURGICAL, FAMILY HISTORY:  History was reviewed and updated as needed, see medical record.    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 He has 2 kids. He is a former smoker and quit recently this fall. He uses marijuana fairly regularly. He drinks alcohol occassionally, usually not more than once a week.     Review of Systems:  Skin:  Negative     Eyes:  Positive for glasses  ENT:  Positive for tinnitus;hearing loss  Respiratory:  Negative (occasional with talking and need to take a deep breath)  Cardiovascular:    chest pain;Positive for;palpitations  Gastroenterology: Negative    Genitourinary:  Positive for nocturia  Musculoskeletal:  Positive for joint pain;joint stiffness  Neurologic:  Positive for numbness or tingling of hands  Psychiatric:  Negative    Heme/Lymph/Imm:  Negative    Endocrine:  Negative       Physical Exam:  Vitals: /77   Pulse 78   Ht 1.727 m (5' 8\")   Wt 88 kg (194 lb)   BMI 29.50 kg/m      Wt Readings from Last 4 Encounters:   12/04/19 88 kg (194 lb)   11/19/19 86.7 kg (191 lb 1.6 oz)   11/06/19 88.4 kg (194 lb 14.4 oz)   09/12/19 85.7 kg (189 lb)     GEN: Appears his stated age and in no acute distress.  HEENT:  Normocephalic, atraumatic. Pupils equal, round. Sclerae nonicteric.   NECK: Carotid pulses full and equal bilaterally with no bruit.  C/V:  Regular rate and rhythm, normal S1 and " S2, no murmur, rub or gallop. Radial pulses 2+ bilaterally.  RESP: Respirations are unlabored. Lungs are clear to auscultation bilaterally without wheezes, rales, or rhonchi.  EXTREM: Warm. No clubbing, cyanosis, or lower extremity edema.  NEURO: Alert and oriented, cooperative. No gross focal deficits. Gait steady.   PSYCH: Affect and mood appropriate.  SKIN: Warm and dry.  The left radial site is soft with no ecchymosis or erythema.    Recent Lab Results:  LIPID RESULTS:  Lab Results   Component Value Date    CHOL 129 11/19/2019    HDL 51 11/19/2019    LDL 68 11/19/2019    TRIG 48 11/19/2019     LIVER ENZYME RESULTS:  Lab Results   Component Value Date    AST 18 09/09/2019    ALT 26 11/19/2019     CBC RESULTS:  Lab Results   Component Value Date    WBC 9.6 11/19/2019    RBC 4.67 11/19/2019    HGB 14.2 11/19/2019    HCT 42.5 11/19/2019    MCV 91 11/19/2019    MCH 30.4 11/19/2019    MCHC 33.4 11/19/2019    RDW 14.6 11/19/2019     11/19/2019     BMP RESULTS:  Lab Results   Component Value Date     11/19/2019    POTASSIUM 4.0 11/19/2019    CHLORIDE 111 (H) 11/19/2019    CO2 24 11/19/2019    ANIONGAP 5 11/19/2019    GLC 98 11/19/2019    BUN 19 11/19/2019    CR 0.73 11/19/2019    GFRESTIMATED >90 11/19/2019    GFRESTBLACK >90 11/19/2019    GABRIELA 8.7 11/19/2019      A1C RESULTS:  Lab Results   Component Value Date    A1C 4.8 06/05/2016     INR RESULTS:  Lab Results   Component Value Date    INR 1.01 11/19/2019    INR 1.55 (H) 09/27/2010     CC  Oscar Husain MD  [address] [place] [address]    This note was completed in part using Dragon voice recognition software. Although reviewed after completion, some word and grammatical errors may occur.      Thank you for allowing me to participate in the care of your patient.    Sincerely,     David Escobedo NP     [facility]

## 2020-02-04 ENCOUNTER — HOSPITAL ENCOUNTER (OUTPATIENT)
Facility: CLINIC | Age: 50
End: 2020-02-04
Attending: ORTHOPAEDIC SURGERY | Admitting: ORTHOPAEDIC SURGERY
Payer: COMMERCIAL

## 2020-02-27 ENCOUNTER — OFFICE VISIT (OUTPATIENT)
Dept: FAMILY MEDICINE | Facility: CLINIC | Age: 50
End: 2020-02-27
Payer: COMMERCIAL

## 2020-02-27 ENCOUNTER — TRANSFERRED RECORDS (OUTPATIENT)
Dept: HEALTH INFORMATION MANAGEMENT | Facility: CLINIC | Age: 50
End: 2020-02-27

## 2020-02-27 VITALS
HEIGHT: 68 IN | HEART RATE: 74 BPM | OXYGEN SATURATION: 97 % | TEMPERATURE: 97.8 F | SYSTOLIC BLOOD PRESSURE: 110 MMHG | WEIGHT: 202 LBS | BODY MASS INDEX: 30.62 KG/M2 | DIASTOLIC BLOOD PRESSURE: 80 MMHG

## 2020-02-27 DIAGNOSIS — Z01.818 PREOP GENERAL PHYSICAL EXAM: Primary | ICD-10-CM

## 2020-02-27 DIAGNOSIS — M17.11 ARTHRITIS OF RIGHT KNEE: ICD-10-CM

## 2020-02-27 DIAGNOSIS — I25.10 CORONARY ARTERY DISEASE INVOLVING NATIVE CORONARY ARTERY OF NATIVE HEART WITHOUT ANGINA PECTORIS: Chronic | ICD-10-CM

## 2020-02-27 DIAGNOSIS — I45.6 WPW (WOLFF-PARKINSON-WHITE SYNDROME): ICD-10-CM

## 2020-02-27 LAB
ANION GAP SERPL CALCULATED.3IONS-SCNC: 5 MMOL/L (ref 3–14)
BUN SERPL-MCNC: 15 MG/DL (ref 7–30)
CALCIUM SERPL-MCNC: 9.1 MG/DL (ref 8.5–10.1)
CHLORIDE SERPL-SCNC: 110 MMOL/L (ref 94–109)
CO2 SERPL-SCNC: 22 MMOL/L (ref 20–32)
CREAT SERPL-MCNC: 0.72 MG/DL (ref 0.66–1.25)
ERYTHROCYTE [DISTWIDTH] IN BLOOD BY AUTOMATED COUNT: 14.6 % (ref 10–15)
GFR SERPL CREATININE-BSD FRML MDRD: >90 ML/MIN/{1.73_M2}
GLUCOSE SERPL-MCNC: 99 MG/DL (ref 70–99)
HCT VFR BLD AUTO: 45.5 % (ref 40–53)
HGB BLD-MCNC: 14.9 G/DL (ref 13.3–17.7)
INR PPP: 0.91 (ref 0.86–1.14)
MCH RBC QN AUTO: 30.3 PG (ref 26.5–33)
MCHC RBC AUTO-ENTMCNC: 32.7 G/DL (ref 31.5–36.5)
MCV RBC AUTO: 93 FL (ref 78–100)
PLATELET # BLD AUTO: 289 10E9/L (ref 150–450)
POTASSIUM SERPL-SCNC: 4.3 MMOL/L (ref 3.4–5.3)
RBC # BLD AUTO: 4.92 10E12/L (ref 4.4–5.9)
SODIUM SERPL-SCNC: 139 MMOL/L (ref 133–144)
WBC # BLD AUTO: 9 10E9/L (ref 4–11)

## 2020-02-27 PROCEDURE — 80048 BASIC METABOLIC PNL TOTAL CA: CPT | Performed by: NURSE PRACTITIONER

## 2020-02-27 PROCEDURE — 36415 COLL VENOUS BLD VENIPUNCTURE: CPT | Performed by: NURSE PRACTITIONER

## 2020-02-27 PROCEDURE — 85610 PROTHROMBIN TIME: CPT | Performed by: NURSE PRACTITIONER

## 2020-02-27 PROCEDURE — 93000 ELECTROCARDIOGRAM COMPLETE: CPT | Performed by: NURSE PRACTITIONER

## 2020-02-27 PROCEDURE — 85027 COMPLETE CBC AUTOMATED: CPT | Performed by: NURSE PRACTITIONER

## 2020-02-27 PROCEDURE — 99215 OFFICE O/P EST HI 40 MIN: CPT | Performed by: NURSE PRACTITIONER

## 2020-02-27 ASSESSMENT — MIFFLIN-ST. JEOR: SCORE: 1765.77

## 2020-02-27 NOTE — PROGRESS NOTES
"  [address] [address]  [phone]  Dept: [phone]    PRE-OP EVALUATION:  Today's date: 2020    Dustin Avila (: 1970) presents for pre-operative evaluation assessment as requested by Dr. Post.  He requires evaluation and anesthesia risk assessment prior to undergoing surgery/procedure for treatment of *** .    Proposed Surgery/ Procedure: right total knee arthroplasty  Date of Surgery/ Procedure: 3/12/20  Time of Surgery/ Procedure: ***  Hospital/Surgical Facility: ***    Primary Physician: Oscar Husain  Type of Anesthesia Anticipated: {ANESTHESIA:318569}    Patient has a Health Care Directive or Living Will:  {YES/NO:004903::\"NO\"}    {PREOP QUESTIONNAIRE OPTIONS(by MA):097612::\"1. NO - Do you have a history of heart attack, stroke, stent, bypass or surgery on an artery in the head, neck, heart or legs?\",\"2. NO - Do you ever have any pain or discomfort in your chest?\",\"3. NO - Do you have a history of  Heart Failure?\",\"4. NO - Are you troubled by shortness of breath when: walking on the level, up a slight hill or at night?\",\"5. NO - Do you currently have a cold, bronchitis or other respiratory infection?\",\"6. NO - Do you have a cough, shortness of breath or wheezing?\",\"7. NO - Do you sometimes get pains in the calves of your legs when you walk?\",\"8. NO - Do you or anyone in your family have previous history of blood clots?\",\"9. NO - Do you or does anyone in your family have a serious bleeding problem such as prolonged bleeding following surgeries or cuts?\",\"10. NO - Have you ever had problems with anemia or been told to take iron pills?\",\"11. NO - Have you had any abnormal blood loss such as black, tarry or bloody stools, or abnormal vaginal bleeding?\",\"12. NO - Have you ever had a blood transfusion?\",\"13. NO - Have you or any of your relatives ever had problems with anesthesia?\",\"14. NO - Do you have sleep apnea, excessive " "snoring or daytime drowsiness?\",\"15. NO - Do you have any prosthetic heart valves?\",\"16. NO - Do you have prosthetic joints?\",\"17. NO - Is there any chance that you may be pregnant?\"}      HPI:     HPI related to upcoming procedure: ***      {. Problems:217615}    MEDICAL HISTORY:     Patient Active Problem List    Diagnosis Date Noted     Status post coronary angiogram 11/19/2019     Priority: Medium     Abnormal cardiovascular stress test 11/06/2019     Priority: Medium     Added automatically from request for surgery [id number]       Old myocardial infarction 04/05/2017     Priority: Medium     Anxiety 03/13/2017     Priority: Medium     JOHN (generalized anxiety disorder) 11/15/2016     Priority: Medium     WPW (Joshua-Parkinson-White syndrome)      Priority: Medium     Coronary artery disease involving native coronary artery of native heart without angina pectoris      Priority: Medium     cardiac cath 2016:  + PIERRE to OM1, PIERRE to LAd, PIERRE to ramus       Hyperlipidemia      Priority: Medium     Hyperlipidemia LDL goal <70 06/17/2016     Priority: Medium     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     Priority: Medium     As per history        Past Medical History:   Diagnosis Date     Achilles tendonitis      Ankle edema      Arthritis      Coronary artery disease     cardiac cath 2016:  + PIERRE to OM1, PIERRE to LAd, PIERRE to ramus     Hyperlipidemia      Incidental lung nodule      Osgood-Schlatter/osteochondroses     left     WPW (Joshua-Parkinson-White syndrome)      Past Surgical History:   Procedure Laterality Date     ARTHROSCOPY KNEE  5/8/2012    Procedure:ARTHROSCOPY KNEE; left knee arthroscopy, partial medial and lateral meniscectomy ; Surgeon:MARIA M THOMAS; Location: OR     CV HEART CATHETERIZATION WITH POSSIBLE INTERVENTION N/A 11/19/2019    Procedure: Heart Catheterization with Possible Intervention;  Surgeon: Heydi Bro MD;  Location:  HE[facility] CARD[facility] CATH LAB     HERNIA REPAIR  " "     ORTHOPEDIC SURGERY      left leg achilles rupture     ORTHOPEDIC SURGERY      right hip replacemnent     SEPTOPLASTY       Current Outpatient Medications   Medication Sig Dispense Refill     aspirin EC 81 MG EC tablet Take 1 tablet (81 mg) by mouth daily 90 tablet 3     atorvastatin (LIPITOR) 40 MG tablet Take 1 tablet (40 mg) by mouth daily 90 tablet 3     medical cannabis (Patient's own supply) See Admin Instructions (The purpose of this order is to document that the patient reports taking medical cannabis.  This is not a prescription, and is not used to certify that the patient has a qualifying medical condition.)       metoprolol succinate ER (TOPROL-XL) 25 MG 24 hr tablet Take 1 tablet (25 mg) by mouth daily 90 tablet 3     nitroGLYcerin (NITROSTAT) 0.4 MG sublingual tablet PLACE 1 TAB TO TONGUE EVERY 5 MINS AS NEEDED-CHEST PAIN. CALL 911 AFTER 3 DOSES IF SYMPTOMS PERSIST 25 tablet 3     OTC products: {OTC ANALGESICS:301259}    No Known Allergies   Latex Allergy: {YES/NO WITH DEFAULT:639945::\"NO\"}    Social History     Tobacco Use     Smoking status: Former Smoker     Packs/day: 0.25     Years: 35.00     Pack years: 8.75     Types: Cigarettes     Start date:      Last attempt to quit: 2019     Years since quittin.3     Smokeless tobacco: Never Used   Substance Use Topics     Alcohol use: Yes     Alcohol/week: 0.0 standard drinks     Comment: once weekly     History   Drug Use     Types: Marijuana       REVIEW OF SYSTEMS:   {ROS Preop Choices:595473}    EXAM:   There were no vitals taken for this visit.  {EXAM Preop Choices:553162}    DIAGNOSTICS:   {DIAGNOSTIC FOR PREOP:527381}    Recent Labs   Lab Test 19  0845 19  0942  16  1037   HGB 14.2 14.8   < > 14.0    308   < > 235   INR 1.01  --   --   --     139   < >  --    POTASSIUM 4.0 4.3   < >  --    CR 0.73 0.69   < >  --    A1C  --   --   --  4.8    < > = values in this interval not displayed.        IMPRESSION: " "  {PREOP REASONS:133358::\"Reason for surgery/procedure: ***\",\"Diagnosis/reason for consult: ***\"}    The proposed surgical procedure is considered {HIGH=major cardiovascular or procedures requiring prolonged anesthesia >4 hours or large fluid shifts;    INTERMEDIATE=abdominal, most orthopedic and intrathoracic surgery; LOW= endoscopy, cataract and breast surgery:005883} risk.    REVISED CARDIAC RISK INDEX  The patient has the following serious cardiovascular risks for perioperative complications such as (MI, PE, VFib and 3  AV Block):  {PREOP REVISED CARDIAC INDEX (RCI):437684:p:\"No serious cardiac risks\"}  INTERPRETATION: {REVISED CARDIAC RISK INTERPRETATION:464380}    The patient has the following additional risks for perioperative complications:  {Additional perioperative risks:626311:p:\"No identified additional risks\"}      ICD-10-CM    1. Preop general physical exam Z01.818        RECOMMENDATIONS:     {IMPORTANT - Conditions - complete carefully!!:859698}    {IMPORTANT - Medications:652765::\"--Patient is to take all scheduled medications on the day of surgery EXCEPT for modifications listed below.\"}    {IMPORTANT - Approval:480999:p:\"APPROVAL GIVEN to proceed with proposed procedure, without further diagnostic evaluation\"}       Signed Electronically by: Wesly Liz NP    Copy of this evaluation report is provided to requesting physician.    [facility] Preop Guidelines    Revised Cardiac Risk Index  "

## 2020-02-27 NOTE — PROGRESS NOTES
[facility]  [address] [address]  [phone]  Dept: [phone]    PRE-OP EVALUATION:  Today's date: 2020    Dustin Avila (: 1970) presents for pre-operative evaluation assessment as requested by Dr. Post.  He requires evaluation and anesthesia risk assessment prior to undergoing surgery/procedure for treatment of right knee arthritis.    Proposed Surgery/ Procedure:RIGHT TOTAL KNEE ARTHROPLASTY   Date of Surgery/ Procedure: 3/12/20  Time of Surgery/ Procedure: 7:30 am   Hospital/Surgical Facility: [facility]    Primary Physician: Oscar Husain  Type of Anesthesia Anticipated: General    Patient has a Health Care Directive or Living Will:  NO    1. YES - Do you have a history of heart attack, stroke, stent, bypass or surgery on an artery in the head, neck, heart or legs? * Heart attack, 2016   2. NO - Do you ever have any pain or discomfort in your chest?  3. NO - Do you have a history of  Heart Failure?  4. NO - Are you troubled by shortness of breath when: walking on the level, up a slight hill or at night?  5. NO - Do you currently have a cold, bronchitis or other respiratory infection?  6. NO - Do you have a cough, shortness of breath or wheezing?  7. NO - Do you sometimes get pains in the calves of your legs when you walk?  8. NO - Do you or anyone in your family have previous history of blood clots?  9. NO - Do you or does anyone in your family have a serious bleeding problem such as prolonged bleeding following surgeries or cuts?  10. NO - Have you ever had problems with anemia or been told to take iron pills?  11. NO - Have you had any abnormal blood loss such as black, tarry or bloody stools, or abnormal vaginal bleeding?  12. NO - Have you ever had a blood transfusion?  13. NO - Have you or any of your relatives ever had problems with anesthesia?  14. NO - Do you have sleep apnea, excessive snoring or daytime drowsiness?  15. NO - Do you  have any prosthetic heart valves?  16. YES - Do you have prosthetic joints? *R hip replacement   17. NO - Is there any chance that you may be pregnant?      HPI:     HPI related to upcoming procedure: Cynthia has multiple joints severely affected by arthritis. He has had right hip replacement already and is now planning to have a right total knee. He was supposed to have this last September, but it was discovered at his preop physical that he was quite non-compliant regarding medication and cardiology follow up for CAD (NSTEMI with placement of 3 drug-eluting stents). He was also reportedly having possible ischemic chest and arm pain. He re-established with cardiology and had an echo, nuclear medicine Lexiscan stress test, and eventual angiogram during the Fall months. His EF was fairly preserved and his angio revealed patent stents with  of right posterior lateral branch with left-to-right collateral filling. He was restarted on beta-blocker and statin (he continued his ASA 81 mg). He states that he has a cardiology appointment tomorrow to hopefully obtain clearance for surgery from that specialty. He denies recent chest pain or other potential ischemic signs or symptoms.     Of note, he last took ASA on 2/20. His surgery was initially going to be on 3/6, and he reportedly read in a surgery packet that ASA should be stopped by patients two weeks prior to surgery. I have asked him to begin taking this today (as it is prescribed to preserve stent patency and not for primary prevention). He will ask cardiology for their recommendations regarding if and when to stop this prior to surgery.       See problem list for active medical problems.  Problems all longstanding and stable, except as noted/documented.  See ROS for pertinent symptoms related to these conditions.      MEDICAL HISTORY:     Patient Active Problem List    Diagnosis Date Noted     Status post coronary angiogram 11/19/2019     Priority: Medium      Abnormal cardiovascular stress test 11/06/2019     Priority: Medium     Added automatically from request for surgery [id number]       Old myocardial infarction 04/05/2017     Priority: Medium     Anxiety 03/13/2017     Priority: Medium     JOHN (generalized anxiety disorder) 11/15/2016     Priority: Medium     WPW (Joshua-Parkinson-White syndrome)      Priority: Medium     Coronary artery disease involving native coronary artery of native heart without angina pectoris      Priority: Medium     cardiac cath 2016:  + PIERRE to OM1, PIERRE to LAd, PIERRE to ramus       Hyperlipidemia      Priority: Medium     Hyperlipidemia LDL goal <70 06/17/2016     Priority: Medium     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     Priority: Medium     As per history        Past Medical History:   Diagnosis Date     Achilles tendonitis      Ankle edema      Arthritis      Coronary artery disease     cardiac cath 2016:  + PIERRE to OM1, PIERRE to LAd, PIERRE to ramus     Hyperlipidemia      Incidental lung nodule      Osgood-Schlatter/osteochondroses     left     WPW (Joshua-Parkinson-White syndrome)      Past Surgical History:   Procedure Laterality Date     ARTHROSCOPY KNEE  5/8/2012    Procedure:ARTHROSCOPY KNEE; left knee arthroscopy, partial medial and lateral meniscectomy ; Surgeon:MARIA M THOMAS; Location: OR     CV HEART CATHETERIZATION WITH POSSIBLE INTERVENTION N/A 11/19/2019    Procedure: Heart Catheterization with Possible Intervention;  Surgeon: Heydi Bro MD;  Location:  HEART CARDIAC CATH LAB     HERNIA REPAIR       ORTHOPEDIC SURGERY      left leg achilles rupture     ORTHOPEDIC SURGERY      right hip replacemnent     SEPTOPLASTY       Current Outpatient Medications   Medication Sig Dispense Refill     aspirin EC 81 MG EC tablet Take 1 tablet (81 mg) by mouth daily 90 tablet 3     atorvastatin (LIPITOR) 40 MG tablet Take 1 tablet (40 mg) by mouth daily 90 tablet 3     medical cannabis (Patient's own supply) See  Admin Instructions (The purpose of this order is to document that the patient reports taking medical cannabis.  This is not a prescription, and is not used to certify that the patient has a qualifying medical condition.)       metoprolol succinate ER (TOPROL-XL) 25 MG 24 hr tablet Take 1 tablet (25 mg) by mouth daily 90 tablet 3     nitroGLYcerin (NITROSTAT) 0.4 MG sublingual tablet PLACE 1 TAB TO TONGUE EVERY 5 MINS AS NEEDED-CHEST PAIN. CALL 911 AFTER 3 DOSES IF SYMPTOMS PERSIST 25 tablet 3     OTC products: Aspirin    No Known Allergies   Latex Allergy: NO    Social History     Tobacco Use     Smoking status: Current Every Day Smoker     Packs/day: 0.25     Years: 35.00     Pack years: 8.75     Types: Vaping Device     Start date:      Last attempt to quit: 2019     Years since quittin.3     Smokeless tobacco: Never Used   Substance Use Topics     Alcohol use: Yes     Alcohol/week: 0.0 standard drinks     Comment: once weekly     History   Drug Use     Types: Marijuana       REVIEW OF SYSTEMS:   CONSTITUTIONAL: NEGATIVE for fever, chills, change in weight  INTEGUMENTARY/SKIN: NEGATIVE for worrisome rashes, moles or lesions  EYES: NEGATIVE for vision changes or irritation  ENT/MOUTH: NEGATIVE for ear, mouth and throat problems  RESP: NEGATIVE for significant cough or SOB  BREAST: NEGATIVE for masses, tenderness or discharge  CV: NEGATIVE for chest pain, palpitations or peripheral edema  GI: NEGATIVE for nausea, abdominal pain, heartburn, or change in bowel habits  : NEGATIVE for frequency, dysuria, or hematuria  MUSCULOSKELETAL: NEGATIVE for significant arthralgias or myalgia  NEURO: NEGATIVE for weakness, dizziness or paresthesias  ENDOCRINE: NEGATIVE for temperature intolerance, skin/hair changes  HEME: NEGATIVE for bleeding problems  PSYCHIATRIC: NEGATIVE for changes in mood or affect    EXAM:   /80 (BP Location: Left arm, Patient Position: Chair, Cuff Size: Adult Regular)   Pulse 74    "Temp 97.8  F (36.6  C) (Tympanic)   Ht 1.727 m (5' 8\")   Wt 91.6 kg (202 lb)   SpO2 97%   BMI 30.71 kg/m      GENERAL APPEARANCE: healthy, alert and no distress     EYES: EOMI, PERRL     HENT: ear canals and TM's normal and nose and mouth without ulcers or lesions     NECK: no adenopathy, no asymmetry, masses, or scars and thyroid normal to palpation     RESP: lungs clear to auscultation - no rales, rhonchi or wheezes     CV: regular rates and rhythm, normal S1 S2, no S3 or S4 and no murmur, click or rub     ABDOMEN:  soft, nontender, no HSM or masses and bowel sounds normal     MS: extremities normal- no gross deformities noted, no evidence of inflammation in joints, FROM in all extremities.     SKIN: no suspicious lesions or rashes     NEURO: Normal strength and tone, sensory exam grossly normal, mentation intact and speech normal     PSYCH: mentation appears normal. and affect normal/bright     LYMPHATICS: No cervical adenopathy    DIAGNOSTICS:     EKG: Normal Sinus Rhythm, Short P-R interval    Labs Resulted Today:   Results for orders placed or performed in visit on 02/27/20   CBC with platelets     Status: None   Result Value Ref Range    WBC 9.0 4.0 - 11.0 10e9/L    RBC Count 4.92 4.4 - 5.9 10e12/L    Hemoglobin 14.9 13.3 - 17.7 g/dL    Hematocrit 45.5 40.0 - 53.0 %    MCV 93 78 - 100 fl    MCH 30.3 26.5 - 33.0 pg    MCHC 32.7 31.5 - 36.5 g/dL    RDW 14.6 10.0 - 15.0 %    Platelet Count 289 150 - 450 10e9/L   Basic metabolic panel     Status: Abnormal   Result Value Ref Range    Sodium 139 133 - 144 mmol/L    Potassium 4.3 3.4 - 5.3 mmol/L    Chloride 110 (H) 94 - 109 mmol/L    Carbon Dioxide 22 20 - 32 mmol/L    Anion Gap 5 3 - 14 mmol/L    Glucose 99 70 - 99 mg/dL    Urea Nitrogen 15 7 - 30 mg/dL    Creatinine 0.72 0.66 - 1.25 mg/dL    GFR Estimate >90 >60 mL/min/[1.73_m2]    GFR Estimate If Black >90 >60 mL/min/[1.73_m2]    Calcium 9.1 8.5 - 10.1 mg/dL   INR     Status: None   Result Value Ref Range    " INR 0.91 0.86 - 1.14       IMPRESSION:   Reason for surgery/procedure: Bone-on-bone arthritis of right knee  Diagnosis/reason for consult: Preoperative clearance    The proposed surgical procedure is considered INTERMEDIATE risk.    REVISED CARDIAC RISK INDEX  The patient has the following serious cardiovascular risks for perioperative complications such as (MI, PE, VFib and 3  AV Block):  Coronary Artery Disease (MI, positive stress test, angina, Qs on EKG)  INTERPRETATION: 1 risks: Class II (low risk - 0.9% complication rate)    The patient has the following additional risks for perioperative complications:  No identified additional risks      ICD-10-CM    1. Preop general physical exam Z01.818 EKG 12-lead complete w/read - Clinics     CBC with platelets     Basic metabolic panel     INR   2. Arthritis of right knee M17.11    3. Coronary artery disease involving native coronary artery of native heart without angina pectoris I25.10    4. WPW (Joshua-Parkinson-White syndrome) I45.6        RECOMMENDATIONS:     --Patient is to take all scheduled medications on the day of surgery EXCEPT for modifications listed below.    **He is to clarify with cardiology regarding ASA hold (he self-stopped this after his 2/20 dose).     APPROVAL GIVEN to proceed with proposed procedure, without further diagnostic evaluation       Signed Electronically by: Wesly Liz NP    Copy of this evaluation report is provided to requesting physician.    [facility] Preop Guidelines    Revised Cardiac Risk Index

## 2020-02-27 NOTE — PATIENT INSTRUCTIONS
Before Your Surgery      Call your surgeon if there is any change in your health. This includes signs of a cold or flu (such as a sore throat, runny nose, cough, rash or fever).    Do not smoke, drink alcohol or take over the counter medicine (unless your surgeon or primary care doctor tells you to) for the 24 hours before and after surgery.    If you take prescribed drugs: Follow your doctor s orders about which medicines to take and which to stop until after surgery.    Eating and drinking prior to surgery: follow the instructions from your surgeon    Take a shower or bath the night before surgery. Use the soap your surgeon gave you to gently clean your skin. If you do not have soap from your surgeon, use your regular soap. Do not shave or scrub the surgery site.  Wear clean pajamas and have clean sheets on your bed.   Before Your Surgery    Call your surgeon if there is any change in your health. This includes signs of a cold or flu (such as a sore throat, runny nose, cough, rash or fever).  Do not smoke, drink alcohol or take over the counter medicine (unless your surgeon or primary care doctor tells you to) for the 24 hours before and after surgery.  If you take prescribed drugs: Follow your doctor s orders about which medicines to take and which to stop until after surgery.  Eating and drinking prior to surgery: follow the instructions from your surgeon  Take a shower or bath the night before surgery. Use the soap your surgeon gave you to gently clean your skin. If you do not have soap from your surgeon, use your regular soap. Do not shave or scrub the surgery site.  Wear clean pajamas and have clean sheets on your bed.       Talk to cardiology about aspirin. Tell them you stopped on 2/20 Ask when to stop preoperatively.

## 2020-02-28 ENCOUNTER — OFFICE VISIT (OUTPATIENT)
Dept: CARDIOLOGY | Facility: CLINIC | Age: 50
End: 2020-02-28
Payer: COMMERCIAL

## 2020-02-28 VITALS
HEIGHT: 68 IN | WEIGHT: 202.5 LBS | DIASTOLIC BLOOD PRESSURE: 70 MMHG | BODY MASS INDEX: 30.69 KG/M2 | HEART RATE: 80 BPM | SYSTOLIC BLOOD PRESSURE: 96 MMHG

## 2020-02-28 DIAGNOSIS — I25.10 CORONARY ARTERY DISEASE INVOLVING NATIVE CORONARY ARTERY OF NATIVE HEART WITHOUT ANGINA PECTORIS: Primary | ICD-10-CM

## 2020-02-28 DIAGNOSIS — E78.5 HYPERLIPIDEMIA LDL GOAL <70: ICD-10-CM

## 2020-02-28 PROCEDURE — 99213 OFFICE O/P EST LOW 20 MIN: CPT | Performed by: INTERNAL MEDICINE

## 2020-02-28 ASSESSMENT — MIFFLIN-ST. JEOR: SCORE: 1768.03

## 2020-02-28 NOTE — LETTER
2/28/2020    Oscar Husain MD  [address]  [place] [address]    RE: Dustin Snydero       Dear Colleague,    I had the pleasure of seeing Dustin Avila in the [facility].    HPI and Plan:     Mr. Dustin Avila was seen in follow-up at [facility] today.  He is 49 years old, has a history of coronary artery disease and prior coronary intervention and is here for pre-operative cardiac evaluation.  Typically, he is followed in this clinic by Dr. Kinney.    Mr. Avila denies any chest, neck, arm or back discomfort.  He denies any exertional dyspnea or new exertional intolerance.  He notes that he has significant degenerative joint disease of both hips and knees.  He attributes this to aggressive dancing when he was younger.  He has undergone hip replacement on the right and noted a market improvement but is increasingly limited by the arthritis in his right knee.  He is planning to undergo total knee arthroplasty next week.    In 2016, Mr. Avila presented with a non-ST elevation myocardial infarct.  On coronary angiography, he was found to have multivessel disease and underwent stent placement in the mid--LAD, ramus branch and the marginal branch of the circumflex coronary artery.  He continued to use tobacco until about 60 days ago when he stopped.  He has substituted vaping but after an initial respiratory reaction to the non--FDA approved cannabis pod, is now using only FDA-approved pods.  He was without insurance for a while and was off statin therapy for a time after his initial therapy.  Lipids during November 2019 indicated an LDL fraction of 68 mg/dL and an HDL of 51 mg/dL with normal triglycerides.    Mr. Avila continues on metoprolol XL 25 mg daily, atorvastatin and low-dose aspirin.  He has now stopped his aspirin in preparation for the upcoming surgery as he was instructed by Dr. Post's clinic.  His last echocardiogram was during October  2019 and the left ventricular ejection fraction was low normal at 50 to 55% without regional wall motion abnormalities.  There was no evidence of clinically significant valvular disease.  At that time, he was undergoing initial evaluation for possible orthopedic surgery and after seeing Dr. Finley, a stress nuclear study was performed.  There was a medium-sized and partially reversible inferior and inferoseptal defect with a mid to basal inferior scar and a small to moderate region of mild mark-infarction ischemia in the distal inferior, mid to basal inferoseptal and basal inferolateral walls.  A coronary angiogram was recommended and demonstrated patency of the prior stents in the mid-LAD, ramus branch and also in the circumflex marginal branch.  The RCA was patent while the first and third right posterolateral branches were occluded and filled by collaterals.  No intervention was recommended.    Exam:  This is a man in no apparent distress.  Blood pressure was 96/78 mmHg, heart rate 80 bpm and regular respiratory rate 14 to 18/min.  Chest was clear to auscultation.    On cardiac auscultation, there was an S1 and S2 without extra sounds or murmur.  The rhythm was regular.    Assessment/Plan:  In assessment, Mr. Avila has a history of coronary artery disease and he is completely asymptomatic.  He has recently undergone reevaluation of his coronary anatomy and should be very appropriate to proceed with his planned surgery.  I explained to him that there is a slightly increased risk of myocardial infarct associated with total knee arthroplasty but he is on very appropriate medical therapy now without cardiac symptoms.  Fortunately, he has stopped smoking tobacco and his risk factors including lipids and blood pressure are under excellent control.  He should do very well and it has been recommended that he reinitiate low-dose aspirin as soon as able following the surgery.  He has normal left ventricular systolic  performance.    I have recommended a return at 1 year.  Follow-up will be arranged with his primary cardiologist, Dr. Fang.  In the interim, he will contact us if he has any adverse symptoms.  Lipids should be reassessed at that time.          Encounter Diagnoses   Name Primary?     Coronary artery disease involving native coronary artery of native heart without angina pectoris Yes     Hyperlipidemia LDL goal <70        CURRENT MEDICATIONS:  Current Outpatient Medications   Medication Sig Dispense Refill     aspirin EC 81 MG EC tablet Take 1 tablet (81 mg) by mouth daily 90 tablet 3     atorvastatin (LIPITOR) 40 MG tablet Take 1 tablet (40 mg) by mouth daily 90 tablet 3     medical cannabis (Patient's own supply) See Admin Instructions (The purpose of this order is to document that the patient reports taking medical cannabis.  This is not a prescription, and is not used to certify that the patient has a qualifying medical condition.)       metoprolol succinate ER (TOPROL-XL) 25 MG 24 hr tablet Take 1 tablet (25 mg) by mouth daily 90 tablet 3     nitroGLYcerin (NITROSTAT) 0.4 MG sublingual tablet PLACE 1 TAB TO TONGUE EVERY 5 MINS AS NEEDED-CHEST PAIN. CALL 911 AFTER 3 DOSES IF SYMPTOMS PERSIST 25 tablet 3       ALLERGIES   No Known Allergies    PAST MEDICAL HISTORY:  Past Medical History:   Diagnosis Date     Achilles tendonitis      Ankle edema      Arthritis      Coronary artery disease     cardiac cath 2016:  + PIERRE to OM1, PIERRE to LAd, PIERRE to ramus     Hyperlipidemia      Incidental lung nodule      Osgood-Schlatter/osteochondroses     left     WPW (Joshua-Parkinson-White syndrome)        PAST SURGICAL HISTORY:  Past Surgical History:   Procedure Laterality Date     ARTHROSCOPY KNEE  5/8/2012    Procedure:ARTHROSCOPY KNEE; left knee arthroscopy, partial medial and lateral meniscectomy ; Surgeon:MARIA M THOMAS; Location:[facility] OR     CV HEART CATHETERIZATION WITH POSSIBLE INTERVENTION N/A 11/19/2019    Procedure:  Heart Catheterization with Possible Intervention;  Surgeon: Heydi Bro MD;  Location:  HEART CARDIAC CATH LAB     HERNIA REPAIR       ORTHOPEDIC SURGERY      left leg achilles rupture     ORTHOPEDIC SURGERY      right hip replacemnent     SEPTOPLASTY         FAMILY HISTORY:  Family History   Problem Relation Age of Onset     Diabetes Father        SOCIAL HISTORY:  Social History     Socioeconomic History     Marital status:      Spouse name: seperated     Number of children: 2     Years of education: None     Highest education level: None   Occupational History     Occupation: self     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   Social Needs     Financial resource strain: None     Food insecurity:     Worry: None     Inability: None     Transportation needs:     Medical: None     Non-medical: None   Tobacco Use     Smoking status: Current Every Day Smoker     Packs/day: 0.25     Years: 35.00     Pack years: 8.75     Types: Vaping Device     Start date:      Last attempt to quit: 2019     Years since quittin.3     Smokeless tobacco: Never Used   Substance and Sexual Activity     Alcohol use: Yes     Alcohol/week: 0.0 standard drinks     Comment: once weekly     Drug use: Yes     Types: Marijuana     Sexual activity: Yes     Partners: Female   Lifestyle     Physical activity:     Days per week: None     Minutes per session: None     Stress: None   Relationships     Social connections:     Talks on phone: None     Gets together: None     Attends [employer] service: None     Active member of club or organization: None     Attends meetings of clubs or organizations: None     Relationship status: None     Intimate partner violence:     Fear of current or ex partner: None     Emotionally abused: None     Physically abused: None     Forced sexual activity: None   Other Topics Concern     Parent/sibling w/ CABG, MI or angioplasty before 65F 55M? No      Service Not Asked     Blood Transfusions Not Asked      "Caffeine Concern No     Occupational Exposure Not Asked     Hobby Hazards Not Asked     Sleep Concern Yes     Stress Concern Yes     Weight Concern Yes     Comment: weight loss     Special Diet Not Asked     Back Care Not Asked     Exercise Yes     Comment: cardiac rehab 3 days week     Bike Helmet Not Asked     Seat Belt Not Asked     Self-Exams Not Asked   Social History Narrative     None       Review of Systems:  Skin:  Negative       Eyes:  Positive for glasses reading  ENT:  Positive for tinnitus;hearing loss DJ's for last 30 years  Respiratory:  Negative (occasional with talking and need to take a deep breath)     Cardiovascular:  Negative   rare  Gastroenterology: Negative      Genitourinary:  Positive for nocturia    Musculoskeletal:  Positive for joint pain;joint stiffness;arthritis (bilateral knees. right knee replacement in a couple months)  Neurologic:  Positive for numbness or tingling of hands right pinky  Psychiatric:  Negative      Heme/Lymph/Imm:  Negative      Endocrine:  Negative        Physical Exam:  Vitals: BP 96/70   Pulse 80   Ht 1.727 m (5' 8\")   Wt 91.9 kg (202 lb 8 oz)   BMI 30.79 kg/m       Constitutional:  cooperative, alert and oriented, well developed, well nourished, in no acute distress        Skin:  warm and dry to the touch, no apparent skin lesions or masses noted          Head:  normocephalic        Eyes:  sclera white        Lymph:      ENT:           Neck:           Respiratory:  normal breath sounds, clear to auscultation, normal A-P diameter, normal symmetry, normal respiratory excursion, no use of accessory muscles         Cardiac: regular rhythm, normal S1/S2, no S3 or S4, apical impulse not displaced, no murmurs, gallops or rubs                                                         GI:           Extremities and Muscular Skeletal:                 Neurological:  no gross motor deficits;affect appropriate        Psych:  Alert and Oriented x 3;affect appropriate, " oriented to time, person and place          CC  Oscar Husain MD  [address] [place] [address]                  Thank you for allowing me to participate in the care of your patient.    Sincerely,     Prema Lopez MD     [facility]

## 2020-02-28 NOTE — PROGRESS NOTES
HPI and Plan:     Mr. Dustin Avila was seen in follow-up at [facility] today.  He is 49 years old, has a history of coronary artery disease and prior coronary intervention and is here for pre-operative cardiac evaluation.  Typically, he is followed in this clinic by Dr. Kinney.    Mr. Avila denies any chest, neck, arm or back discomfort.  He denies any exertional dyspnea or new exertional intolerance.  He notes that he has significant degenerative joint disease of both hips and knees.  He attributes this to aggressive dancing when he was younger.  He has undergone hip replacement on the right and noted a market improvement but is increasingly limited by the arthritis in his right knee.  He is planning to undergo total knee arthroplasty next week.    In 2016, Mr. Avila presented with a non-ST elevation myocardial infarct.  On coronary angiography, he was found to have multivessel disease and underwent stent placement in the mid--LAD, ramus branch and the marginal branch of the circumflex coronary artery.  He continued to use tobacco until about 60 days ago when he stopped.  He has substituted vaping but after an initial respiratory reaction to the non--FDA approved cannabis pod, is now using only FDA-approved pods.  He was without insurance for a while and was off statin therapy for a time after his initial therapy.  Lipids during November 2019 indicated an LDL fraction of 68 mg/dL and an HDL of 51 mg/dL with normal triglycerides.    Mr. Avila continues on metoprolol XL 25 mg daily, atorvastatin and low-dose aspirin.  He has now stopped his aspirin in preparation for the upcoming surgery as he was instructed by Dr. Post's clinic.  His last echocardiogram was during October 2019 and the left ventricular ejection fraction was low normal at 50 to 55% without regional wall motion abnormalities.  There was no evidence of clinically significant valvular disease.  At that time, he was undergoing  initial evaluation for possible orthopedic surgery and after seeing Dr. Finley, a stress nuclear study was performed.  There was a medium-sized and partially reversible inferior and inferoseptal defect with a mid to basal inferior scar and a small to moderate region of mild mark-infarction ischemia in the distal inferior, mid to basal inferoseptal and basal inferolateral walls.  A coronary angiogram was recommended and demonstrated patency of the prior stents in the mid-LAD, ramus branch and also in the circumflex marginal branch.  The RCA was patent while the first and third right posterolateral branches were occluded and filled by collaterals.  No intervention was recommended.    Exam:  This is a man in no apparent distress.  Blood pressure was 96/78 mmHg, heart rate 80 bpm and regular respiratory rate 14 to 18/min.  Chest was clear to auscultation.    On cardiac auscultation, there was an S1 and S2 without extra sounds or murmur.  The rhythm was regular.    Assessment/Plan:  In assessment, Mr. Avila has a history of coronary artery disease and he is completely asymptomatic.  He has recently undergone reevaluation of his coronary anatomy and should be very appropriate to proceed with his planned surgery.  I explained to him that there is a slightly increased risk of myocardial infarct associated with total knee arthroplasty but he is on very appropriate medical therapy now without cardiac symptoms.  Fortunately, he has stopped smoking tobacco and his risk factors including lipids and blood pressure are under excellent control.  He should do very well and it has been recommended that he reinitiate low-dose aspirin as soon as able following the surgery.  He has normal left ventricular systolic performance.    I have recommended a return at 1 year.  Follow-up will be arranged with his primary cardiologist, Dr. Fang.  In the interim, he will contact us if he has any adverse symptoms.  Lipids should be reassessed at  that time.          Encounter Diagnoses   Name Primary?     Coronary artery disease involving native coronary artery of native heart without angina pectoris Yes     Hyperlipidemia LDL goal <70        CURRENT MEDICATIONS:  Current Outpatient Medications   Medication Sig Dispense Refill     aspirin EC 81 MG EC tablet Take 1 tablet (81 mg) by mouth daily 90 tablet 3     atorvastatin (LIPITOR) 40 MG tablet Take 1 tablet (40 mg) by mouth daily 90 tablet 3     medical cannabis (Patient's own supply) See Admin Instructions (The purpose of this order is to document that the patient reports taking medical cannabis.  This is not a prescription, and is not used to certify that the patient has a qualifying medical condition.)       metoprolol succinate ER (TOPROL-XL) 25 MG 24 hr tablet Take 1 tablet (25 mg) by mouth daily 90 tablet 3     nitroGLYcerin (NITROSTAT) 0.4 MG sublingual tablet PLACE 1 TAB TO TONGUE EVERY 5 MINS AS NEEDED-CHEST PAIN. CALL 911 AFTER 3 DOSES IF SYMPTOMS PERSIST 25 tablet 3       ALLERGIES   No Known Allergies    PAST MEDICAL HISTORY:  Past Medical History:   Diagnosis Date     Achilles tendonitis      Ankle edema      Arthritis      Coronary artery disease     cardiac cath 2016:  + PIERRE to OM1, PIERRE to LAd, PIERRE to ramus     Hyperlipidemia      Incidental lung nodule      Osgood-Schlatter/osteochondroses     left     WPW (Joshua-Parkinson-White syndrome)        PAST SURGICAL HISTORY:  Past Surgical History:   Procedure Laterality Date     ARTHROSCOPY KNEE  5/8/2012    Procedure:ARTHROSCOPY KNEE; left knee arthroscopy, partial medial and lateral meniscectomy ; Surgeon:MARIA M THOMAS; Location: OR     CV HEART CATHETERIZATION WITH POSSIBLE INTERVENTION N/A 11/19/2019    Procedure: Heart Catheterization with Possible Intervention;  Surgeon: Heydi Bro MD;  Location:  HEART CARDIAC CATH LAB     HERNIA REPAIR       ORTHOPEDIC SURGERY      left leg achilles rupture     ORTHOPEDIC SURGERY       right hip replacemnent     SEPTOPLASTY         FAMILY HISTORY:  Family History   Problem Relation Age of Onset     Diabetes Father        SOCIAL HISTORY:  Social History     Socioeconomic History     Marital status:      Spouse name: seperated     Number of children: 2     Years of education: None     Highest education level: None   Occupational History     Occupation: self     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   Social Needs     Financial resource strain: None     Food insecurity:     Worry: None     Inability: None     Transportation needs:     Medical: None     Non-medical: None   Tobacco Use     Smoking status: Current Every Day Smoker     Packs/day: 0.25     Years: 35.00     Pack years: 8.75     Types: Vaping Device     Start date:      Last attempt to quit: 2019     Years since quittin.3     Smokeless tobacco: Never Used   Substance and Sexual Activity     Alcohol use: Yes     Alcohol/week: 0.0 standard drinks     Comment: once weekly     Drug use: Yes     Types: Marijuana     Sexual activity: Yes     Partners: Female   Lifestyle     Physical activity:     Days per week: None     Minutes per session: None     Stress: None   Relationships     Social connections:     Talks on phone: None     Gets together: None     Attends Religion service: None     Active member of club or organization: None     Attends meetings of clubs or organizations: None     Relationship status: None     Intimate partner violence:     Fear of current or ex partner: None     Emotionally abused: None     Physically abused: None     Forced sexual activity: None   Other Topics Concern     Parent/sibling w/ CABG, MI or angioplasty before 65F 55M? No      Service Not Asked     Blood Transfusions Not Asked     Caffeine Concern No     Occupational Exposure Not Asked     Hobby Hazards Not Asked     Sleep Concern Yes     Stress Concern Yes     Weight Concern Yes     Comment: weight loss     Special Diet Not Asked     Back Care Not  "Asked     Exercise Yes     Comment: cardiac rehab 3 days week     Bike Helmet Not Asked     Seat Belt Not Asked     Self-Exams Not Asked   Social History Narrative     None       Review of Systems:  Skin:  Negative       Eyes:  Positive for glasses reading  ENT:  Positive for tinnitus;hearing loss DJ's for last 30 years  Respiratory:  Negative (occasional with talking and need to take a deep breath)     Cardiovascular:  Negative   rare  Gastroenterology: Negative      Genitourinary:  Positive for nocturia    Musculoskeletal:  Positive for joint pain;joint stiffness;arthritis (bilateral knees. right knee replacement in a couple months)  Neurologic:  Positive for numbness or tingling of hands right pinky  Psychiatric:  Negative      Heme/Lymph/Imm:  Negative      Endocrine:  Negative        Physical Exam:  Vitals: BP 96/70   Pulse 80   Ht 1.727 m (5' 8\")   Wt 91.9 kg (202 lb 8 oz)   BMI 30.79 kg/m      Constitutional:  cooperative, alert and oriented, well developed, well nourished, in no acute distress        Skin:  warm and dry to the touch, no apparent skin lesions or masses noted          Head:  normocephalic        Eyes:  sclera white        Lymph:      ENT:           Neck:           Respiratory:  normal breath sounds, clear to auscultation, normal A-P diameter, normal symmetry, normal respiratory excursion, no use of accessory muscles         Cardiac: regular rhythm, normal S1/S2, no S3 or S4, apical impulse not displaced, no murmurs, gallops or rubs                                                         GI:           Extremities and Muscular Skeletal:                 Neurological:  no gross motor deficits;affect appropriate        Psych:  Alert and Oriented x 3;affect appropriate, oriented to time, person and place            Oscar Husain MD  [address] [place] [address]                  "

## 2020-03-03 ENCOUNTER — VIRTUAL VISIT (OUTPATIENT)
Dept: FAMILY MEDICINE | Facility: OTHER | Age: 50
End: 2020-03-03

## 2020-03-04 NOTE — PROGRESS NOTES
"Date: 2020 20:44:39  Clinician: Senait Schofield  Clinician NPI: [id number]  Patient: Dustin Avila  Patient : 1970  Patient Address: [address] [place] [address], [place] [address]  Patient Phone: [phone]  Visit Protocol: URI  Patient Summary:  Dustin is a 49 year old ( : 1970 ) male who initiated a Visit for cold, sinus infection, or influenza. When asked the question \"Please sign me up to receive news, health information and promotions from [employer].\", Dustin responded \"No\".    Dustin states his symptoms started gradually 3-6 days ago.   His symptoms consist of a cough, facial pain or pressure, malaise, and nasal congestion. He is experiencing difficulty breathing due to nasal congestion but he is not short of breath.   Symptom details     Nasal secretions: The color of his mucus is green.    Cough: Dustin coughs a few times an hour and his cough is not more bothersome at night. Phlegm comes into his throat when he coughs. He believes his cough is caused by post-nasal drip. The color of the phlegm is green.     Facial pain or pressure: The facial pain or pressure feels worse when bending over or leaning forward.      Dustin denies having fever, rhinitis, wheezing, teeth pain, myalgias, headache, sore throat, ear pain, and chills. He also denies having recent facial or sinus surgery in the past 60 days, double sickening (worsening symptoms after initial improvement), and taking antibiotic medication for the symptoms.   Precipitating events  He has not recently been exposed to someone with influenza. Dustin has not been in close contact with any high risk individuals.   Pertinent COVID-19 information  Dustin has not traveled internationally in the last 14 days before the start of his symptoms.   Dustin has not had close contact with a laboratory confirmed positive COVID-19 patient within 14 days of symptom onset.   Pertinent medical history  Dustin had 1 sinus infection within the past " year.   Dustin needs a return to work/school note.   Weight: 200 lbs   Dustin does not smoke or use smokeless tobacco.   Weight: 200 lbs    MEDICATIONS: Aspir-Low oral, atorvastatin oral, metoprolol succinate oral, ALLERGIES: NKDA  Clinician Response:  Dear Dustin,  Based on the information provided, you have a viral upper respiratory infection, otherwise known as a cold. Symptoms vary from person to person, but can include sneezing, coughing, a runny nose, sore throat, and headache and range from mild to severe.  Unfortunately, there are no medications that can cure a cold, so treatment is focused on controlling symptoms as much as possible. Most people gradually feel better until symptoms are gone in 1-2 weeks.  Medication information  Because you have a viral infection, antibiotics will not help you get better. Treating a viral infection with antibiotics could actually make you feel worse.  Self care  The following tips will keep you as comfortable as possible while you recover:     Rest    Drink plenty of water and other liquids    Take a hot shower to loosen congestion    Take a spoonful of honey to reduce your cough     When to seek care  Please be seen in a clinic or urgent care if new symptoms develop, or symptoms become worse.   Diagnosis: Viral URI  Diagnosis ICD: J06.9

## 2020-03-09 ENCOUNTER — HOSPITAL ENCOUNTER (EMERGENCY)
Facility: CLINIC | Age: 50
Discharge: HOME | End: 2020-03-09
Attending: EMERGENCY MEDICINE | Admitting: EMERGENCY MEDICINE
Payer: COMMERCIAL

## 2020-03-09 ENCOUNTER — OFFICE VISIT (OUTPATIENT)
Dept: FAMILY MEDICINE | Facility: CLINIC | Age: 50
End: 2020-03-09
Payer: COMMERCIAL

## 2020-03-09 VITALS
OXYGEN SATURATION: 95 % | RESPIRATION RATE: 18 BRPM | DIASTOLIC BLOOD PRESSURE: 74 MMHG | SYSTOLIC BLOOD PRESSURE: 119 MMHG | HEART RATE: 86 BPM | TEMPERATURE: 97.9 F

## 2020-03-09 VITALS
HEART RATE: 71 BPM | TEMPERATURE: 97.5 F | SYSTOLIC BLOOD PRESSURE: 116 MMHG | HEIGHT: 68 IN | BODY MASS INDEX: 29.4 KG/M2 | WEIGHT: 194 LBS | OXYGEN SATURATION: 96 % | DIASTOLIC BLOOD PRESSURE: 74 MMHG

## 2020-03-09 DIAGNOSIS — J20.9 ACUTE BRONCHITIS WITH SYMPTOMS > 10 DAYS: Primary | ICD-10-CM

## 2020-03-09 DIAGNOSIS — R10.13 EPIGASTRIC PAIN: ICD-10-CM

## 2020-03-09 LAB
ALBUMIN SERPL-MCNC: 3.9 G/DL (ref 3.4–5)
ALP SERPL-CCNC: 97 U/L (ref 40–150)
ALT SERPL W P-5'-P-CCNC: 33 U/L (ref 0–70)
ANION GAP SERPL CALCULATED.3IONS-SCNC: 8 MMOL/L (ref 3–14)
AST SERPL W P-5'-P-CCNC: 17 U/L (ref 0–45)
BASOPHILS # BLD AUTO: 0 10E9/L (ref 0–0.2)
BASOPHILS NFR BLD AUTO: 0.2 %
BILIRUB SERPL-MCNC: 0.5 MG/DL (ref 0.2–1.3)
BUN SERPL-MCNC: 14 MG/DL (ref 7–30)
CALCIUM SERPL-MCNC: 9.5 MG/DL (ref 8.5–10.1)
CHLORIDE SERPL-SCNC: 104 MMOL/L (ref 94–109)
CO2 SERPL-SCNC: 22 MMOL/L (ref 20–32)
CREAT SERPL-MCNC: 0.78 MG/DL (ref 0.66–1.25)
DIFFERENTIAL METHOD BLD: ABNORMAL
EOSINOPHIL # BLD AUTO: 0 10E9/L (ref 0–0.7)
EOSINOPHIL NFR BLD AUTO: 0.3 %
ERYTHROCYTE [DISTWIDTH] IN BLOOD BY AUTOMATED COUNT: 13.5 % (ref 10–15)
GFR SERPL CREATININE-BSD FRML MDRD: >90 ML/MIN/{1.73_M2}
GLUCOSE SERPL-MCNC: 99 MG/DL (ref 70–99)
HCT VFR BLD AUTO: 46.3 % (ref 40–53)
HGB BLD-MCNC: 15.7 G/DL (ref 13.3–17.7)
IMM GRANULOCYTES # BLD: 0.1 10E9/L (ref 0–0.4)
IMM GRANULOCYTES NFR BLD: 0.6 %
INR PPP: 0.96 (ref 0.86–1.14)
LIPASE SERPL-CCNC: 74 U/L (ref 73–393)
LYMPHOCYTES # BLD AUTO: 2 10E9/L (ref 0.8–5.3)
LYMPHOCYTES NFR BLD AUTO: 13.1 %
MCH RBC QN AUTO: 30.8 PG (ref 26.5–33)
MCHC RBC AUTO-ENTMCNC: 33.9 G/DL (ref 31.5–36.5)
MCV RBC AUTO: 91 FL (ref 78–100)
MONOCYTES # BLD AUTO: 0.7 10E9/L (ref 0–1.3)
MONOCYTES NFR BLD AUTO: 4.3 %
NEUTROPHILS # BLD AUTO: 12.4 10E9/L (ref 1.6–8.3)
NEUTROPHILS NFR BLD AUTO: 81.5 %
NRBC # BLD AUTO: 0 10*3/UL
NRBC BLD AUTO-RTO: 0 /100
PLATELET # BLD AUTO: 298 10E9/L (ref 150–450)
POTASSIUM SERPL-SCNC: 3.9 MMOL/L (ref 3.4–5.3)
PROT SERPL-MCNC: 7.8 G/DL (ref 6.8–8.8)
RBC # BLD AUTO: 5.1 10E12/L (ref 4.4–5.9)
SODIUM SERPL-SCNC: 134 MMOL/L (ref 133–144)
WBC # BLD AUTO: 15.2 10E9/L (ref 4–11)

## 2020-03-09 PROCEDURE — 99284 EMERGENCY DEPT VISIT MOD MDM: CPT | Mod: 25

## 2020-03-09 PROCEDURE — 85025 COMPLETE CBC W/AUTO DIFF WBC: CPT | Performed by: EMERGENCY MEDICINE

## 2020-03-09 PROCEDURE — 25000132 ZZH RX MED GY IP 250 OP 250 PS 637: Performed by: EMERGENCY MEDICINE

## 2020-03-09 PROCEDURE — 80053 COMPREHEN METABOLIC PANEL: CPT | Performed by: EMERGENCY MEDICINE

## 2020-03-09 PROCEDURE — 99213 OFFICE O/P EST LOW 20 MIN: CPT | Performed by: PHYSICIAN ASSISTANT

## 2020-03-09 PROCEDURE — 25000128 H RX IP 250 OP 636: Performed by: EMERGENCY MEDICINE

## 2020-03-09 PROCEDURE — 96361 HYDRATE IV INFUSION ADD-ON: CPT

## 2020-03-09 PROCEDURE — 25800030 ZZH RX IP 258 OP 636: Performed by: EMERGENCY MEDICINE

## 2020-03-09 PROCEDURE — 96376 TX/PRO/DX INJ SAME DRUG ADON: CPT

## 2020-03-09 PROCEDURE — 96375 TX/PRO/DX INJ NEW DRUG ADDON: CPT

## 2020-03-09 PROCEDURE — 85610 PROTHROMBIN TIME: CPT | Performed by: EMERGENCY MEDICINE

## 2020-03-09 PROCEDURE — 96374 THER/PROPH/DIAG INJ IV PUSH: CPT

## 2020-03-09 PROCEDURE — 83690 ASSAY OF LIPASE: CPT | Performed by: EMERGENCY MEDICINE

## 2020-03-09 PROCEDURE — 25000125 ZZHC RX 250: Performed by: EMERGENCY MEDICINE

## 2020-03-09 RX ORDER — ONDANSETRON 2 MG/ML
4 INJECTION INTRAMUSCULAR; INTRAVENOUS EVERY 30 MIN PRN
Status: DISCONTINUED | OUTPATIENT
Start: 2020-03-09 | End: 2020-03-10 | Stop reason: HOSPADM

## 2020-03-09 RX ORDER — ONDANSETRON 2 MG/ML
4 INJECTION INTRAMUSCULAR; INTRAVENOUS
Status: COMPLETED | OUTPATIENT
Start: 2020-03-09 | End: 2020-03-09

## 2020-03-09 RX ORDER — SODIUM CHLORIDE 9 MG/ML
1000 INJECTION, SOLUTION INTRAVENOUS CONTINUOUS
Status: DISCONTINUED | OUTPATIENT
Start: 2020-03-09 | End: 2020-03-09 | Stop reason: CLARIF

## 2020-03-09 RX ORDER — PANTOPRAZOLE SODIUM 40 MG/1
40 TABLET, DELAYED RELEASE ORAL
Qty: 30 TABLET | Refills: 0 | Status: SHIPPED | OUTPATIENT
Start: 2020-03-09 | End: 2020-05-21

## 2020-03-09 RX ORDER — AZITHROMYCIN 250 MG/1
TABLET, FILM COATED ORAL
Qty: 6 TABLET | Refills: 0 | Status: SHIPPED | OUTPATIENT
Start: 2020-03-09 | End: 2020-05-21

## 2020-03-09 RX ORDER — KETOROLAC TROMETHAMINE 15 MG/ML
15 INJECTION, SOLUTION INTRAMUSCULAR; INTRAVENOUS ONCE
Status: COMPLETED | OUTPATIENT
Start: 2020-03-09 | End: 2020-03-09

## 2020-03-09 RX ORDER — ONDANSETRON 4 MG/1
4 TABLET, ORALLY DISINTEGRATING ORAL EVERY 4 HOURS PRN
Qty: 10 TABLET | Refills: 0 | Status: SHIPPED | OUTPATIENT
Start: 2020-03-09 | End: 2020-05-21

## 2020-03-09 RX ORDER — METOCLOPRAMIDE HYDROCHLORIDE 5 MG/ML
10 INJECTION INTRAMUSCULAR; INTRAVENOUS ONCE
Status: COMPLETED | OUTPATIENT
Start: 2020-03-09 | End: 2020-03-09

## 2020-03-09 RX ORDER — ALBUTEROL SULFATE 90 UG/1
2 AEROSOL, METERED RESPIRATORY (INHALATION) EVERY 6 HOURS PRN
Qty: 18 G | Refills: 1 | Status: SHIPPED | OUTPATIENT
Start: 2020-03-09 | End: 2020-05-21

## 2020-03-09 RX ADMIN — FAMOTIDINE 20 MG: 10 INJECTION, SOLUTION INTRAVENOUS at 18:56

## 2020-03-09 RX ADMIN — ONDANSETRON 4 MG: 2 INJECTION INTRAMUSCULAR; INTRAVENOUS at 18:55

## 2020-03-09 RX ADMIN — ONDANSETRON 4 MG: 2 INJECTION INTRAMUSCULAR; INTRAVENOUS at 16:53

## 2020-03-09 RX ADMIN — SODIUM CHLORIDE 1000 ML: 9 INJECTION, SOLUTION INTRAVENOUS at 18:57

## 2020-03-09 RX ADMIN — KETOROLAC TROMETHAMINE 15 MG: 15 INJECTION, SOLUTION INTRAMUSCULAR; INTRAVENOUS at 21:05

## 2020-03-09 RX ADMIN — METOCLOPRAMIDE 10 MG: 5 INJECTION, SOLUTION INTRAMUSCULAR; INTRAVENOUS at 20:24

## 2020-03-09 RX ADMIN — LIDOCAINE HYDROCHLORIDE 30 ML: 20 SOLUTION ORAL; TOPICAL at 18:59

## 2020-03-09 ASSESSMENT — MIFFLIN-ST. JEOR: SCORE: 1729.48

## 2020-03-09 ASSESSMENT — ENCOUNTER SYMPTOMS
VOMITING: 1
ABDOMINAL PAIN: 1
NAUSEA: 1
BLOOD IN STOOL: 0

## 2020-03-09 NOTE — ED AVS SNAPSHOT
Emergency Department  [address] [place] [id number]  Phone:  [phone]  Fax:  [phone]                                    Dustin Avila   MRN: [id number]    Department:   Emergency Department   Date of Visit:  3/9/2020           After Visit Summary Signature Page    I have received my discharge instructions, and my questions have been answered. I have discussed any challenges I see with this plan with the nurse or doctor.    ..........................................................................................................................................  Patient/Patient Representative Signature      ..........................................................................................................................................  Patient Representative Print Name and Relationship to Patient    ..................................................               ................................................  Date                                   Time    ..........................................................................................................................................  Reviewed by Signature/Title    ...................................................              ..............................................  Date                                               Time          22EPIC Rev 08/18

## 2020-03-09 NOTE — ED TRIAGE NOTES
Patient states he took theraflu today and since then he has had dark brown emesis with abdominal pain.

## 2020-03-09 NOTE — PROGRESS NOTES
Subjective     Dustin Avila is a 49 year old male who presents to clinic today for the following health issues:    HPI   SINUS SYMPTOMS      Duration: x 2/     Description  Green to yellow  mucus nasal congestion, rhinorrhea, mild cough and post nasal drainage     Severity: moderate    Accompanying signs and symptoms: None    History (predisposing factors):  none    Precipitating or alleviating factors: None    Therapies tried and outcome:  neti pot, aleve cold and sinus, thera flu         Patient Active Problem List   Diagnosis     Hyperlipidemia LDL goal <70     Anomalous atrioventricular excitation     WPW (Joshua-Parkinson-White syndrome)     Coronary artery disease involving native coronary artery of native heart without angina pectoris     Hyperlipidemia     JOHN (generalized anxiety disorder)     Anxiety     Old myocardial infarction     Abnormal cardiovascular stress test     Status post coronary angiogram     Past Surgical History:   Procedure Laterality Date     ARTHROSCOPY KNEE  2012    Procedure:ARTHROSCOPY KNEE; left knee arthroscopy, partial medial and lateral meniscectomy ; Surgeon:MARIA M THOMAS; Location: OR     CV HEART CATHETERIZATION WITH POSSIBLE INTERVENTION N/A 2019    Procedure: Heart Catheterization with Possible Intervention;  Surgeon: Heydi Bro MD;  Location:  HEART CARDIAC CATH LAB     HERNIA REPAIR       ORTHOPEDIC SURGERY      left leg achilles rupture     ORTHOPEDIC SURGERY      right hip replacemnent     SEPTOPLASTY         Social History     Tobacco Use     Smoking status: Current Every Day Smoker     Packs/day: 0.25     Years: 35.00     Pack years: 8.75     Types: Vaping Device     Start date:      Last attempt to quit: 2019     Years since quittin.3     Smokeless tobacco: Never Used   Substance Use Topics     Alcohol use: Yes     Alcohol/week: 0.0 standard drinks     Comment: once weekly     Family History   Problem Relation Age of Onset  "    Diabetes Father          Current Outpatient Medications   Medication Sig Dispense Refill     albuterol (PROAIR HFA/PROVENTIL HFA/VENTOLIN HFA) 108 (90 Base) MCG/ACT inhaler Inhale 2 puffs into the lungs every 6 hours as needed for shortness of breath / dyspnea or wheezing 18 g 1     aspirin EC 81 MG EC tablet Take 1 tablet (81 mg) by mouth daily 90 tablet 3     atorvastatin (LIPITOR) 40 MG tablet Take 1 tablet (40 mg) by mouth daily 90 tablet 3     azithromycin (ZITHROMAX) 250 MG tablet Two tablets first day, then one tablet daily for four days. 6 tablet 0     medical cannabis (Patient's own supply) See Admin Instructions (The purpose of this order is to document that the patient reports taking medical cannabis.  This is not a prescription, and is not used to certify that the patient has a qualifying medical condition.)       metoprolol succinate ER (TOPROL-XL) 25 MG 24 hr tablet Take 1 tablet (25 mg) by mouth daily 90 tablet 3     nitroGLYcerin (NITROSTAT) 0.4 MG sublingual tablet PLACE 1 TAB TO TONGUE EVERY 5 MINS AS NEEDED-CHEST PAIN. CALL 911 AFTER 3 DOSES IF SYMPTOMS PERSIST 25 tablet 3     No Known Allergies    Reviewed and updated as needed this visit by Provider         Review of Systems   ROS COMP: Constitutional, HEENT, cardiovascular, pulmonary, gi and gu systems are negative, except as otherwise noted.      Objective    /74   Pulse 71   Temp 97.5  F (36.4  C) (Tympanic)   Ht 1.727 m (5' 8\")   Wt 88 kg (194 lb)   SpO2 96%   BMI 29.50 kg/m    Body mass index is 29.5 kg/m .  Physical Exam   GENERAL: healthy, alert and no distress  EYES: Eyes grossly normal to inspection, PERRL and conjunctivae and sclerae normal  HENT: ear canals and TM's normal, nose and mouth without ulcers or lesions  NECK: no adenopathy  RESP: mild scattered wheezing throughout lung fields, crackles noted bilaterally at lung bases  PSYCH: mentation appears normal, affect normal/bright    Diagnostic Test Results:  Labs " "reviewed in [facility]        Assessment & Plan     1. Acute bronchitis with symptoms > 10 days  - azithromycin (ZITHROMAX) 250 MG tablet; Two tablets first day, then one tablet daily for four days.  Dispense: 6 tablet; Refill: 0  - albuterol (PROAIR HFA/PROVENTIL HFA/VENTOLIN HFA) 108 (90 Base) MCG/ACT inhaler; Inhale 2 puffs into the lungs every 6 hours as needed for shortness of breath / dyspnea or wheezing  Dispense: 18 g; Refill: 1     Tobacco Cessation:   reports that he has been smoking vaping device. He started smoking about 34 years ago. He has a 8.75 pack-year smoking history. He has never used smokeless tobacco.        BMI:   Estimated body mass index is 29.5 kg/m  as calculated from the following:    Height as of this encounter: 1.727 m (5' 8\").    Weight as of this encounter: 88 kg (194 lb).           See Patient Instructions    No follow-ups on file.    Alex Florian PA-C  [facility]    "

## 2020-03-09 NOTE — ED PROVIDER NOTES
History   Chief Complaint:  Hematemesis    HPI   Dustin Avila is a 49 year old male with a history of CAD, WPW, and hyperlipidemia who presents with hematemesis. The patient reports that he was seen in clinic this morning for evaluation of nasal congestion, rhinorrhea, and cough at which time he was diagnosed with bronchitis. He was sent home with a Z-gus and took his first dose (2 tablets today). At 1200 this afternoon, he took Equate after which he developed epigastric abdominal pain, nausea and vomiting of brown emesis with black spots. After the initial vomiting he had dry heaves, prompting him to drink water which he vomited shortly after. Out of concern for hematemesis, the patient came to the ED for evaluation. The patient denies black or bloody stool and has not used NSAID's recently. He does note he started taking a baby aspirin today for an upcoming surgery. The patient has a history of diverticulitis but notes his current symptoms are different from any prior pain he has had. He has no history of ulcers or gastritis.    Allergies:  No known drug allergies     Medications:    Albuterol inhaler  Aspirin 81 mg  Lipitor  Zithromax  Medical cannabis (patient's own supply)  Toprol-XL  Nitrostat     Past Medical History:    Anomalous atrioventricular excitation  Achilles tendonitis  Anxiety   Myocardial infarction  Ankle edema  Arthritis  Coronary artery disease  Hyperlipidemia  Diverticulosis  Marijuana smoker, continuous  Obesity  Osteoarthritis of knee  Allergic rhinitis   Chlamydia  Primary localized osteoarthrosis, lower leg  GERD  Condyloma acuminatum  Incidental lung nodule  Osgood-Schlatter/osteochondroses  WPW (Joshua-Parkinson-White syndrome)    Past Surgical History:    Septoplasty  CV heart catheterization   Hernia repair, abdominal, no mesh  Left hip replacement  Meniscectomy, left  Vasectomy   Left leg achilles rupture  Right hip replacement  Septoplasty   Tonsillectomy   Arthroscopy, right  hip    Family History:    Diabetes, type 2 - father  Crohn's disease - mother  IBS - mother    Social History:  Positive for tobacco use - 0.25 packs/day. Currently vapes.  Positive for alcohol use - once weekly.  Positive for drug use - marijuana.  Marital Status:   [4]     Review of Systems   Gastrointestinal: Positive for abdominal pain (epigastric), nausea and vomiting (hematemesis). Negative for blood in stool.   All other systems reviewed and are negative.    Physical Exam     Patient Vitals for the past 24 hrs:   BP Temp Pulse Resp SpO2   03/09/20 2105 -- -- -- -- 97 %   03/09/20 2030 (!) 155/96 -- 59 -- 99 %   03/09/20 2015 137/89 -- 50 -- 96 %   03/09/20 1644 (!) 148/89 97.9  F (36.6  C) 60 18 100 %     Physical Exam  Constitutional: White male holding emesis bag, sitting.  HENT: No signs of trauma. Oropharynx clear.  Eyes: EOM are normal. Pupils are equal, round, and reactive to light.   Neck: Normal range of motion. No JVD present. No cervical adenopathy.  Cardiovascular: Regular rhythm.  Exam reveals no gallop and no friction rub.    No murmur heard.  Pulmonary/Chest: Bilateral breath sounds normal. No wheezes, rhonchi or rales.  Abdominal: Soft. Minimal epigastric tenderness. No rebound or guarding. 2+ femoral pulses.  Musculoskeletal: No edema. No tenderness.   Lymphadenopathy: No lymphadenopathy.   Neurological: Alert and oriented to person, place, and time. Normal strength. Coordination normal.   Skin: Skin is warm and dry. No rash noted. No erythema.     Emergency Department Course   Laboratory:  Laboratory findings were communicated with the patient who voiced understanding of the findings.    CBC: WBC 15.2, HGB 15.7,   CMP: Creatinine 0.78  Lipase: 74  INR: 0.96    Interventions:  1653 Zofran 4 mg IV  1855 Zofran 4 mg IV  1856 Pepcid 20 mg IV  1857 NS 1L IV Bolus  1859 GI Cocktail - Maalox 15 mL, Viscous Lidocaine 15 mL, 30 mL suspension PO  2024 Reglan 10 mg IV  2105 Toradol 15 mg  IV    Emergency Department Course:  Past medical records, nursing notes, and vitals reviewed.   1918 I performed an exam of the patient and obtained history, as documented above.    IV inserted and blood drawn.    2143 I rechecked the patient. Patient is feeling improved. Explained findings to the patient.    Findings and plan explained to the patient. Patient discharged home with instructions regarding supportive care, medications, and reasons to return. The importance of close follow-up was reviewed.    Impression & Plan    Medical Decision Making:  Dustin Avila is a 49 year old male who comes in with epigastric pain and vomiting. He shows me a picture of some very mildly pale-brown phlegm with a couple specks of black in it. He states all his symptoms began after taking Theraflu. He had been to urgent care earlier today for bronchitis and the gave him azithromycin to start. He tried to take that as well. Patient states he has never had anything like this. He has had no history of ulcers or gastritis. No black or bloody stools. He occasionally takes Advil but not often. He is not a big drinker. On exam, he has minimal epigastric tenderness. Labs were obtained, white count was slightly up probably due to vomiting, otherwise they are normal. Patient was treated with IV Pepcid, fluids, one dose of Toradol, Zofran, and Reglan and now he is doing much better. He had a GI cocktail as well which helped. I doubt this is an acute GI bleed from ulcer or gastritis, more likely a reaction to the medication either the Theraflu or possibly the azithromycin. He will go home with Zofran, Protonix twice a day, he can use Maalox. He should recheck with his PMD later in the week or in the ED if symptoms worsen.    Diagnosis:    ICD-10-CM   1. Epigastric pain  R10.13       Disposition:  Discharged to home.    Discharge Medications:  New Prescriptions    ONDANSETRON (ZOFRAN ODT) 4 MG ODT TAB    Take 1 tablet (4 mg) by mouth every 4  hours as needed for nausea    PANTOPRAZOLE (PROTONIX) 40 MG EC TABLET    Take 1 tablet (40 mg) by mouth 2 times daily for 15 days       Reece Corona  3/9/2020    EMERGENCY DEPARTMENT  I, Reece Corona, am serving as a scribe at 7:18 PM on 3/9/2020 to document services personally performed by El Abraham MD based on my observations and the provider's statements to me.        El Abraham MD  03/09/20 [id number]

## 2020-03-10 NOTE — ED NOTES
Asking for pain medications.  I gave the Pepcid and zofran and he immediately stated this did not help and wanted actual pain meds.  I gave him the GI cocktail and told him we will reassess in 20 minutes.

## 2020-03-11 ENCOUNTER — OFFICE VISIT (OUTPATIENT)
Dept: FAMILY MEDICINE | Facility: CLINIC | Age: 50
End: 2020-03-11
Payer: COMMERCIAL

## 2020-03-11 VITALS
DIASTOLIC BLOOD PRESSURE: 70 MMHG | SYSTOLIC BLOOD PRESSURE: 119 MMHG | HEART RATE: 66 BPM | BODY MASS INDEX: 29.65 KG/M2 | WEIGHT: 195 LBS | TEMPERATURE: 98 F | OXYGEN SATURATION: 96 %

## 2020-03-11 DIAGNOSIS — J20.9 ACUTE WHEEZY BRONCHITIS: Primary | ICD-10-CM

## 2020-03-11 PROCEDURE — 99213 OFFICE O/P EST LOW 20 MIN: CPT | Performed by: INTERNAL MEDICINE

## 2020-03-11 RX ORDER — PREDNISONE 20 MG/1
40 TABLET ORAL DAILY
Qty: 10 TABLET | Refills: 0 | Status: SHIPPED | OUTPATIENT
Start: 2020-03-11 | End: 2020-05-21

## 2020-03-11 RX ORDER — INHALER, ASSIST DEVICES
SPACER (EA) MISCELLANEOUS
Qty: 1 EACH | Refills: 0 | Status: SHIPPED | OUTPATIENT
Start: 2020-03-11 | End: 2021-08-20

## 2020-03-11 NOTE — PROGRESS NOTES
S/w pt and advised of Dr. Sin's message ok to take the prednisone prescribed and she checked with orthopedist.    Pt states understanding and will start the medication.    Karon GU RN  EP Triage

## 2020-03-11 NOTE — PROGRESS NOTES
Subjective     Dustin Avila is a 49 year old male who presents to clinic today for the following health issues:    HPI   RESPIRATORY SYMPTOMS      Duration: cough, shortness of breath     Description  cough    Severity: mild    Accompanying signs and symptoms: None    History (predisposing factors):  none    Precipitating or alleviating factors: None    Therapies tried and outcome:  Antibiotics     Cynthia is here with wheezing. He was seen two days ago - had mild wheezing and some crackles noted on exam.  Was prescribed azithromycin and albuterol inhaler.  He was having a lot of sinus plugging as well, and that is much better today.  His breathing feels worse - he has wheezing, chest feels tight, intermittent non-productive cough. He times, took 2 puffs which helped, but was feeling breath even a couple hours later.  It was a little anxious about taking too much, so did not use it very often.  He quit smoking a month or 2 ago.  Does use marijuana occasionally.  Very remote history of asthma in grade school.    He is anxious about taking prednisone in case this delays his knee surgery, currently scheduled in 2 weeks.  It has been delayed numerous times.    Reviewed and updated as needed this visit by Provider         Review of Systems   ROS COMP: Constitutional, HEENT, cardiovascular, pulmonary, gi systems are negative, except as otherwise noted.      Objective    /70   Pulse 66   Temp 98  F (36.7  C) (Oral)   Wt 88.5 kg (195 lb)   SpO2 96%   BMI 29.65 kg/m    Body mass index is 29.65 kg/m .  Physical Exam   Gen: well appearing, pleasant man, no distress  HEENT: PERRL, no conjunctival injection, no posterior pharynx erythema, MMM.  TM normal b/l.     Neck: supple, no LAD  Pulm: audible wheezing, diffuse wheezing b/l.   CV: RRR, normal S1 and S2, no murmurs  Ext: 2+ radial pulses                Assessment & Plan     1. Acute wheezy bronchitis  Will treat with prednisone, continue course of azithromycin.   Advised spacer for the inhaler. He can use 2-4 puffs every 4 hrs.  Called and spoke with his orthopedist office, this course of prednisone should not change his surgery date.   - predniSONE (DELTASONE) 20 MG tablet; Take 2 tablets (40 mg) by mouth daily for 5 days  Dispense: 10 tablet; Refill: 0  - spacer (OPTICHAMBER AKY) holding chamber; Use with albuterol inhaler  Dispense: 1 each; Refill: 0         Return in about 3 months (around 6/11/2020) for Physical Exam.    Mackenzie Sin MD  [facility] [place]

## 2020-03-11 NOTE — Clinical Note
Please let patient know the orthopedist has no issue with him taking the course of prednisone I prescribed in relation to his upcoming surgery. Thank you!

## 2020-03-17 ENCOUNTER — TELEPHONE (OUTPATIENT)
Dept: FAMILY MEDICINE | Facility: CLINIC | Age: 50
End: 2020-03-17

## 2020-03-17 NOTE — TELEPHONE ENCOUNTER
Reason for Call:  Other     Detailed comments: Please fax Pre Op notes over to [facility]  [phone] - Fax  [phone]- Phone        Phone Number Patient can be reached at: Cell number on file:    Telephone Information:   Mobile [phone]       Best Time: any    Can we leave a detailed message on this number? YES    Call taken on 3/17/2020 at 12:26 PM by Nitza Case

## 2020-05-19 ENCOUNTER — TELEPHONE (OUTPATIENT)
Dept: CARDIOLOGY | Facility: CLINIC | Age: 50
End: 2020-05-19

## 2020-05-19 NOTE — TELEPHONE ENCOUNTER
Patient called requesting a cardiac clearance visit with Dr. Lopez for knee replacement surgery done by JALEEL Vincent overnight obs stay. On 6-3-2020.  Patient to call scheduling for visit 5- with Dr. Lopez.

## 2020-05-21 ENCOUNTER — OFFICE VISIT (OUTPATIENT)
Dept: FAMILY MEDICINE | Facility: CLINIC | Age: 50
End: 2020-05-21
Payer: COMMERCIAL

## 2020-05-21 ENCOUNTER — VIRTUAL VISIT (OUTPATIENT)
Dept: CARDIOLOGY | Facility: CLINIC | Age: 50
End: 2020-05-21
Payer: COMMERCIAL

## 2020-05-21 VITALS
TEMPERATURE: 97.1 F | DIASTOLIC BLOOD PRESSURE: 62 MMHG | WEIGHT: 202 LBS | OXYGEN SATURATION: 96 % | HEART RATE: 72 BPM | BODY MASS INDEX: 30.71 KG/M2 | SYSTOLIC BLOOD PRESSURE: 112 MMHG

## 2020-05-21 DIAGNOSIS — I25.10 CORONARY ARTERY DISEASE INVOLVING NATIVE CORONARY ARTERY OF NATIVE HEART WITHOUT ANGINA PECTORIS: Primary | ICD-10-CM

## 2020-05-21 DIAGNOSIS — E78.5 HYPERLIPIDEMIA LDL GOAL <70: ICD-10-CM

## 2020-05-21 DIAGNOSIS — I25.10 CORONARY ARTERY DISEASE INVOLVING NATIVE CORONARY ARTERY OF NATIVE HEART WITHOUT ANGINA PECTORIS: Chronic | ICD-10-CM

## 2020-05-21 DIAGNOSIS — I25.2 OLD MYOCARDIAL INFARCTION: ICD-10-CM

## 2020-05-21 DIAGNOSIS — Z01.818 PREOP GENERAL PHYSICAL EXAM: Primary | ICD-10-CM

## 2020-05-21 LAB
BASOPHILS # BLD AUTO: 0.1 10E9/L (ref 0–0.2)
BASOPHILS NFR BLD AUTO: 0.5 %
DIFFERENTIAL METHOD BLD: NORMAL
EOSINOPHIL # BLD AUTO: 0.4 10E9/L (ref 0–0.7)
EOSINOPHIL NFR BLD AUTO: 3.3 %
ERYTHROCYTE [DISTWIDTH] IN BLOOD BY AUTOMATED COUNT: 14 % (ref 10–15)
HCT VFR BLD AUTO: 45.5 % (ref 40–53)
HGB BLD-MCNC: 15.7 G/DL (ref 13.3–17.7)
LYMPHOCYTES # BLD AUTO: 3.4 10E9/L (ref 0.8–5.3)
LYMPHOCYTES NFR BLD AUTO: 31 %
MCH RBC QN AUTO: 31.2 PG (ref 26.5–33)
MCHC RBC AUTO-ENTMCNC: 34.5 G/DL (ref 31.5–36.5)
MCV RBC AUTO: 91 FL (ref 78–100)
MONOCYTES # BLD AUTO: 0.7 10E9/L (ref 0–1.3)
MONOCYTES NFR BLD AUTO: 6.5 %
NEUTROPHILS # BLD AUTO: 6.4 10E9/L (ref 1.6–8.3)
NEUTROPHILS NFR BLD AUTO: 58.7 %
PLATELET # BLD AUTO: 258 10E9/L (ref 150–450)
RBC # BLD AUTO: 5.03 10E12/L (ref 4.4–5.9)
WBC # BLD AUTO: 10.8 10E9/L (ref 4–11)

## 2020-05-21 PROCEDURE — 80048 BASIC METABOLIC PNL TOTAL CA: CPT | Performed by: FAMILY MEDICINE

## 2020-05-21 PROCEDURE — 99213 OFFICE O/P EST LOW 20 MIN: CPT | Mod: 95 | Performed by: INTERNAL MEDICINE

## 2020-05-21 PROCEDURE — 85025 COMPLETE CBC W/AUTO DIFF WBC: CPT | Performed by: FAMILY MEDICINE

## 2020-05-21 PROCEDURE — 36415 COLL VENOUS BLD VENIPUNCTURE: CPT | Performed by: FAMILY MEDICINE

## 2020-05-21 PROCEDURE — 99215 OFFICE O/P EST HI 40 MIN: CPT | Performed by: FAMILY MEDICINE

## 2020-05-21 NOTE — PROGRESS NOTES
[facility] [place]  [address] [address]  [phone]  Dept: [phone]    PRE-OP EVALUATION:  Today's date: 2020    Dustin Avila (: 1970) presents for pre-operative evaluation assessment as requested by Dr. Sylvester .  He requires evaluation and anesthesia risk assessment prior to undergoing surgery/procedure for treatment of right knee replacement  .    Proposed Surgery/ Procedure: right knee replacement   Date of Surgery/ Procedure: 6/3/2020  Time of Surgery/ Procedure: 6:30 am   Hospital/Surgical Facility:  [facility]   Fax number for surgical facility: [phone]   Primary Physician: Wesly Liz  Type of Anesthesia Anticipated: General    Patient has a Health Care Directive or Living Will:  NO    1. YES - DO YOU HAVE A HISTORY OF HEART ATTACK, STROKE, STENT, BYPASS OR SURGERY ON AN ARTERY IN THE HEAD, NECK, HEART OR LEG?   2. NO - Do you ever have any pain or discomfort in your chest?  3. NO - Do you have a history of  Heart Failure?  4. NO - Are you troubled by shortness of breath when: walking on the level, up a slight hill or at night?  5. NO - Do you currently have a cold, bronchitis or other respiratory infection?  6. NO - Do you have a cough, shortness of breath or wheezing?  7. NO - Do you sometimes get pains in the calves of your legs when you walk?  8. NO - Do you or anyone in your family have previous history of blood clots?  9. NO - Do you or does anyone in your family have a serious bleeding problem such as prolonged bleeding following surgeries or cuts?  10. NO - Have you ever had problems with anemia or been told to take iron pills?  11. NO - Have you had any abnormal blood loss such as black, tarry or bloody stools, or abnormal vaginal bleeding?  12. NO - Have you ever had a blood transfusion?  13. NO - Have you or any of your relatives ever had problems with anesthesia?  14. NO - Do you have sleep apnea, excessive snoring or  daytime drowsiness?  15. NO - Do you have any prosthetic heart valves?  16. YES - DO YOU HAVE PROSTHETIC JOINTS?  17. NO - Is there any chance that you may be pregnant?      HPI:       See problem list for active medical problems.  Problems all longstanding and stable, except as noted/documented.  See ROS for pertinent symptoms related to these conditions.      MEDICAL HISTORY:     Patient Active Problem List    Diagnosis Date Noted     Status post coronary angiogram 11/19/2019     Priority: Medium     Abnormal cardiovascular stress test 11/06/2019     Priority: Medium     Added automatically from request for surgery [id number]       Old myocardial infarction 04/05/2017     Priority: Medium     Anxiety 03/13/2017     Priority: Medium     JOHN (generalized anxiety disorder) 11/15/2016     Priority: Medium     WPW (Joshua-Parkinson-White syndrome)      Priority: Medium     Coronary artery disease involving native coronary artery of native heart without angina pectoris      Priority: Medium     cardiac cath 11/2019: no intervention; cath 2016:  + PIERRE to OM1, PIERRE to LAD, PIERRE to ramus       Hyperlipidemia      Priority: Medium     Hyperlipidemia LDL goal <70 06/17/2016     Priority: Medium     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     Priority: Medium     As per history        Past Medical History:   Diagnosis Date     Achilles tendonitis      Ankle edema      Arthritis      Coronary artery disease     cardiac cath 11/2019: no intervention; cath 2016:  + PIERRE to OM1, PIERRE to LAd, PIERRE to ramus     Hyperlipidemia      Incidental lung nodule      Osgood-Schlatter/osteochondroses     left     WPW (Joshua-Parkinson-White syndrome)      Past Surgical History:   Procedure Laterality Date     ARTHROSCOPY KNEE  5/8/2012    Procedure:ARTHROSCOPY KNEE; left knee arthroscopy, partial medial and lateral meniscectomy ; Surgeon:MARIA M THOMAS; Location:[facility] OR     CV HEART CATHETERIZATION WITH POSSIBLE INTERVENTION N/A  2019    Procedure: Heart Catheterization with Possible Intervention;  Surgeon: Heydi Bro MD;  Location:  HEART CARDIAC CATH LAB     HERNIA REPAIR       ORTHOPEDIC SURGERY      left leg achilles rupture     ORTHOPEDIC SURGERY      right hip replacemnent     SEPTOPLASTY       Current Outpatient Medications   Medication Sig Dispense Refill     albuterol (PROAIR HFA/PROVENTIL HFA/VENTOLIN HFA) 108 (90 Base) MCG/ACT inhaler Inhale 2 puffs into the lungs every 6 hours as needed for shortness of breath / dyspnea or wheezing 18 g 1     aspirin EC 81 MG EC tablet Take 1 tablet (81 mg) by mouth daily 90 tablet 3     atorvastatin (LIPITOR) 40 MG tablet Take 1 tablet (40 mg) by mouth daily 90 tablet 3     azithromycin (ZITHROMAX) 250 MG tablet Two tablets first day, then one tablet daily for four days. 6 tablet 0     medical cannabis (Patient's own supply) See Admin Instructions (The purpose of this order is to document that the patient reports taking medical cannabis.  This is not a prescription, and is not used to certify that the patient has a qualifying medical condition.)       metoprolol succinate ER (TOPROL-XL) 25 MG 24 hr tablet Take 1 tablet (25 mg) by mouth daily 90 tablet 3     nitroGLYcerin (NITROSTAT) 0.4 MG sublingual tablet PLACE 1 TAB TO TONGUE EVERY 5 MINS AS NEEDED-CHEST PAIN. CALL 911 AFTER 3 DOSES IF SYMPTOMS PERSIST 25 tablet 3     spacer (OPTICHAMBER KAY) holding chamber Use with albuterol inhaler 1 each 0     OTC products: None, except as noted above    No Known Allergies   Latex Allergy: NO    Social History     Tobacco Use     Smoking status: Current Every Day Smoker     Packs/day: 0.25     Years: 35.00     Pack years: 8.75     Types: Vaping Device     Start date:      Last attempt to quit: 2019     Years since quittin.5     Smokeless tobacco: Never Used   Substance Use Topics     Alcohol use: Yes     Alcohol/week: 0.0 standard drinks     Comment: once weekly      History   Drug Use     Types: Marijuana       REVIEW OF SYSTEMS:   CONSTITUTIONAL: NEGATIVE for fever, chills, change in weight  ENT/MOUTH: NEGATIVE for ear, mouth and throat problems  RESP: NEGATIVE for significant cough or SOB  CV: NEGATIVE for chest pain, palpitations or peripheral edema    EXAM:   /62   Pulse 72   Temp 97.1  F (36.2  C) (Tympanic)   Wt 91.6 kg (202 lb)   SpO2 96%   BMI 30.71 kg/m    GENERAL APPEARANCE: healthy, alert and no distress  HENT: ear canals and TM's normal and nose and mouth without ulcers or lesions  RESP: lungs clear to auscultation - no rales, rhonchi or wheezes  CV: regular rate and rhythm, normal S1 S2, no S3 or S4 and no murmur, click or rub   ABDOMEN: soft, nontender, no HSM or masses and bowel sounds normal  NEURO: Normal strength and tone, sensory exam grossly normal, mentation intact and speech normal    DIAGNOSTICS:     Labs Resulted Today:   Results for orders placed or performed in visit on 05/21/20   CBC with platelets and differential     Status: None   Result Value Ref Range    WBC 10.8 4.0 - 11.0 10e9/L    RBC Count 5.03 4.4 - 5.9 10e12/L    Hemoglobin 15.7 13.3 - 17.7 g/dL    Hematocrit 45.5 40.0 - 53.0 %    MCV 91 78 - 100 fl    MCH 31.2 26.5 - 33.0 pg    MCHC 34.5 31.5 - 36.5 g/dL    RDW 14.0 10.0 - 15.0 %    Platelet Count 258 150 - 450 10e9/L    % Neutrophils 58.7 %    % Lymphocytes 31.0 %    % Monocytes 6.5 %    % Eosinophils 3.3 %    % Basophils 0.5 %    Absolute Neutrophil 6.4 1.6 - 8.3 10e9/L    Absolute Lymphocytes 3.4 0.8 - 5.3 10e9/L    Absolute Monocytes 0.7 0.0 - 1.3 10e9/L    Absolute Eosinophils 0.4 0.0 - 0.7 10e9/L    Absolute Basophils 0.1 0.0 - 0.2 10e9/L    Diff Method Automated Method    **Basic metabolic panel FUTURE anytime     Status: None   Result Value Ref Range    Sodium 136 133 - 144 mmol/L    Potassium 4.3 3.4 - 5.3 mmol/L    Chloride 107 94 - 109 mmol/L    Carbon Dioxide 24 20 - 32 mmol/L    Anion Gap 5 3 - 14 mmol/L     Glucose 85 70 - 99 mg/dL    Urea Nitrogen 14 7 - 30 mg/dL    Creatinine 0.83 0.66 - 1.25 mg/dL    GFR Estimate >90 >60 mL/min/[1.73_m2]    GFR Estimate If Black >90 >60 mL/min/[1.73_m2]    Calcium 8.8 8.5 - 10.1 mg/dL     EK20, sinus rhythm with short CA, no change from previous EKG finding  Recent Labs   Lab Test 20  1652 20  1651 20  0819  16  1037   HGB  --  15.7 14.9   < > 14.0   PLT  --  298 289   < > 235   INR 0.96  --  0.91   < >  --    NA  --  134 139   < >  --    POTASSIUM  --  3.9 4.3   < >  --    CR  --  0.78 0.72   < >  --    A1C  --   --   --   --  4.8    < > = values in this interval not displayed.        IMPRESSION:   Reason for surgery/procedure:  right knee replacement    The proposed surgical procedure is considered INTERMEDIATE risk.    REVISED CARDIAC RISK INDEX  The patient has the following serious cardiovascular risks for perioperative complications such as (MI, PE, VFib and 3  AV Block):  High risk surgery (>5% cardiac complication risk)  Coronary Artery Disease (MI, positive stress test, angina, Qs on EKG)  INTERPRETATION: 1 risks: Class II (low risk - 0.9% complication rate)    The patient has the following additional risks for perioperative complications:  The ASCVD Risk score ([place] MCCORMICK Jr., et al., 2013) failed to calculate for the following reasons:    The patient has a prior MI or stroke diagnosis      ICD-10-CM    1. Preop general physical exam  Z01.818 CBC with platelets and differential     **Basic metabolic panel FUTURE anytime   2. Coronary artery disease involving native coronary artery of native heart without angina pectoris  I25.10 CBC with platelets and differential     **Basic metabolic panel FUTURE anytime   3. Hyperlipidemia LDL goal <70  E78.5 CBC with platelets and differential     **Basic metabolic panel FUTURE anytime   4. Old myocardial infarction  I25.2 CBC with platelets and differential     **Basic metabolic panel FUTURE anytime        RECOMMENDATIONS:     --Consult hospital rounder / IM to assist post-op medical management    Cardiovascular Risk  Performs 4 METs exercise without symptoms.   Patient is already on a Beta Blocker. Continue Betablocker therapy after surgery, using Beta blocker order set as necessary for NPO status.  Patient was seen and evaluated already for cardiac clarification of operability       --Patient is to take all scheduled medications on the day of surgery EXCEPT for modifications listed below.    Anticoagulant or Antiplatelet Medication Use  ASPIRIN: Discontinue ASA 7-10 days prior to procedure to reduce bleeding risk.  It should be resumed post-operatively.        APPROVAL GIVEN to proceed with proposed procedure, without further diagnostic evaluation       Signed Electronically by: Isidro Roth MD    Copy of this evaluation report is provided to requesting physician.    [facility] Preop Guidelines    Revised Cardiac Risk Index

## 2020-05-21 NOTE — LETTER
5/21/2020    Wesly Liz, NP  [address]  [place] [address]    RE: Dustin Snydero       Dear Colleague,    I had the pleasure of seeing Dustin Avila in the [facility].    Mr. Dustin Avila was new to myself 3 months ago when he was seen for preoperative evaluation.  His surgery was delayed and he requires repeat cardiology clearance.  He is 49 years old, has a history of coronary artery disease, prior coronary intervention, Joshua-Parkinson-White syndrome and is followed in this clinic by Dr. Kinney.    Mr. Avila again denies any chest, neck, arm or back discomfort.  He denies any exertional dyspnea or new exertional intolerance.  He is 12-lead EKGs of both November 2019 and February 2020 have demonstrated short ND intervals (110 ms).  He has not recently experienced palpitations or syncope.  He has significant degenerative joint disease of both hips and knees which he attributed to aggressive dancing when he was younger.  He has undergone hip replacement on the right but has been limited by  arthritis in his right knee.  He is planning to undergo total knee arthroplasty.  Initial surgery was delayed first by respiratory illness and then by the COVID pandemic precautions.    In 2016, Mr. Avila presented with a non-ST elevation myocardial infarct.  On coronary angiography, he was found to have multivessel disease and underwent stent placement in the mid-LAD, ramus branch and the marginal branch of the circumflex coronary artery.  He continued to use tobacco until about 60 days ago when he stopped.  He has substituted vaping but after an initial respiratory reaction to the non-FDA approved cannabis pod, is now using only FDA-approved pods.  He was without insurance for a while and was off statin therapy for a time after his initial therapy.  Lipids during November 2019 indicated an LDL fraction of 68 mg/dL and an HDL of 51 mg/dL with normal triglycerides.      Austin continues on metoprolol XL 25 mg daily, atorvastatin and low-dose aspirin.  He previously was instructed to stop aspirin prior to surgery as instructed by Dr. Post's clinic.  His last echocardiogram was during October 2019 and the left ventricular ejection fraction was low normal at 50 to 55% without regional wall motion abnormalities.  There was no evidence of clinically significant valvular disease.  At that time, he was undergoing initial evaluation for possible orthopedic surgery and after seeing our partner Dr. Rahman, a stress nuclear study was performed.  There was a medium-sized and partially reversible inferior and inferoseptal defect with a mid to basal inferior scar and a small to moderate region of mild mark-infarction ischemia in the distal inferior, mid to basal inferoseptal and basal inferolateral walls.  A coronary angiogram was recommended and demonstrated patency of the prior stents in the mid-LAD, ramus branch and also in the circumflex marginal branch.  The RCA was patent while the first and third right posterolateral branches were occluded and filled by collaterals.  No intervention was recommended.    Exam:  This is a man in no apparent distress.  He was alert and oriented to person place and time.    Assessment/Plan:  In assessment, Mr. Avila has a history of coronary artery disease and he is completely asymptomatic.  As noted above, he recently underwent reevaluation of his coronary anatomy without need for revascularization.  From a cardiac standpoint, he can proceed with his planned surgery without further evaluation.  As discussed previously, there is a slightly increased risk of myocardial infarct associated with total knee arthroplasty but he is on very appropriate medical therapy now without cardiac symptoms.  He previously stopped smoking tobacco and his risk factors including lipids and blood pressure are treated and controlled.  He should do well from a cardiac standpoint.   If aspirin is held for the surgery, he should restart low-dose aspirin as soon as able following the surgery.  He has normal left ventricular systolic performance.    Follow-up at 1 year will be arranged with his primary cardiologist, Dr. Kinney.  In the interim, he will contact us if he has any adverse symptoms.    Video Start Time: 11:32  Video End Time: 11:45      Thank you for allowing me to participate in the care of your patient.    Sincerely,     Prema Lopez MD     [facility]

## 2020-05-21 NOTE — PROGRESS NOTES
"Dustin Avila is a 49 year old male who is being evaluated via a billable video visit.      The patient has been notified of following:     \"This video visit will be conducted via a call between you and your physician/provider. We have found that certain health care needs can be provided without the need for an in-person physical exam.  This service lets us provide the care you need with a video conversation.  If a prescription is necessary we can send it directly to your pharmacy.  If lab work is needed we can place an order for that and you can then stop by our lab to have the test done at a later time.    Video visits are billed at different rates depending on your insurance coverage.  Please reach out to your insurance provider with any questions.    If during the course of the call the physician/provider feels a video visit is not appropriate, you will not be charged for this service.\"    Patient has given verbal consent for Video visit? Yes    How would you like to obtain your AVS? [facility] [place]    Patient would like the video invitation sent by: Text to cell phone: [phone]  MACK Jeffries      Video-Visit Details    Type of service:  Video Visit    Mr. Dustin Avila was new to myself 3 months ago when he was seen for preoperative evaluation.  His surgery was delayed and he requires repeat cardiology clearance.  He is 49 years old, has a history of coronary artery disease, prior coronary intervention, Joshua-Parkinson-White syndrome and is followed in this clinic by Dr. Kinney.    Mr. Avila again denies any chest, neck, arm or back discomfort.  He denies any exertional dyspnea or new exertional intolerance.  He is 12-lead EKGs of both November 2019 and February 2020 have demonstrated short IL intervals (110 ms).  He has not recently experienced palpitations or syncope.  He has significant degenerative joint disease of both hips and knees which he attributed to aggressive dancing when he " was younger.  He has undergone hip replacement on the right but has been limited by  arthritis in his right knee.  He is planning to undergo total knee arthroplasty.  Initial surgery was delayed first by respiratory illness and then by the COVID pandemic precautions.    In 2016, Mr. Avila presented with a non-ST elevation myocardial infarct.  On coronary angiography, he was found to have multivessel disease and underwent stent placement in the mid-LAD, ramus branch and the marginal branch of the circumflex coronary artery.  He continued to use tobacco until about 60 days ago when he stopped.  He has substituted vaping but after an initial respiratory reaction to the non-FDA approved cannabis pod, is now using only FDA-approved pods.  He was without insurance for a while and was off statin therapy for a time after his initial therapy.  Lipids during November 2019 indicated an LDL fraction of 68 mg/dL and an HDL of 51 mg/dL with normal triglycerides.    Mr. Avila continues on metoprolol XL 25 mg daily, atorvastatin and low-dose aspirin.  He previously was instructed to stop aspirin prior to surgery as instructed by Dr. Post's clinic.  His last echocardiogram was during October 2019 and the left ventricular ejection fraction was low normal at 50 to 55% without regional wall motion abnormalities.  There was no evidence of clinically significant valvular disease.  At that time, he was undergoing initial evaluation for possible orthopedic surgery and after seeing our partner Dr. Rahman, a stress nuclear study was performed.  There was a medium-sized and partially reversible inferior and inferoseptal defect with a mid to basal inferior scar and a small to moderate region of mild mark-infarction ischemia in the distal inferior, mid to basal inferoseptal and basal inferolateral walls.  A coronary angiogram was recommended and demonstrated patency of the prior stents in the mid-LAD, ramus branch and also in the circumflex  marginal branch.  The RCA was patent while the first and third right posterolateral branches were occluded and filled by collaterals.  No intervention was recommended.    Exam:  This is a man in no apparent distress.  He was alert and oriented to person place and time.    Assessment/Plan:  In assessment, Mr. Avila has a history of coronary artery disease and he is completely asymptomatic.  As noted above, he recently underwent reevaluation of his coronary anatomy without need for revascularization.  From a cardiac standpoint, he can proceed with his planned surgery without further evaluation.  As discussed previously, there is a slightly increased risk of myocardial infarct associated with total knee arthroplasty but he is on very appropriate medical therapy now without cardiac symptoms.  He previously stopped smoking tobacco and his risk factors including lipids and blood pressure are treated and controlled.  He should do well from a cardiac standpoint.  If aspirin is held for the surgery, he should restart low-dose aspirin as soon as able following the surgery.  He has normal left ventricular systolic performance.    Follow-up at 1 year will be arranged with his primary cardiologist, Dr. Kinney.  In the interim, he will contact us if he has any adverse symptoms.    Video Start Time: 11:32  Video End Time: 11:45    Originating Location (pt. Location): Home    Distant Location (provider location):  [facility]     Platform used for Video Visit: Radha Lopez MD

## 2020-05-22 LAB
ANION GAP SERPL CALCULATED.3IONS-SCNC: 5 MMOL/L (ref 3–14)
BUN SERPL-MCNC: 14 MG/DL (ref 7–30)
CALCIUM SERPL-MCNC: 8.8 MG/DL (ref 8.5–10.1)
CHLORIDE SERPL-SCNC: 107 MMOL/L (ref 94–109)
CO2 SERPL-SCNC: 24 MMOL/L (ref 20–32)
CREAT SERPL-MCNC: 0.83 MG/DL (ref 0.66–1.25)
GFR SERPL CREATININE-BSD FRML MDRD: >90 ML/MIN/{1.73_M2}
GLUCOSE SERPL-MCNC: 85 MG/DL (ref 70–99)
POTASSIUM SERPL-SCNC: 4.3 MMOL/L (ref 3.4–5.3)
SODIUM SERPL-SCNC: 136 MMOL/L (ref 133–144)

## 2020-06-03 ENCOUNTER — TRANSFERRED RECORDS (OUTPATIENT)
Dept: HEALTH INFORMATION MANAGEMENT | Facility: CLINIC | Age: 50
End: 2020-06-03

## 2020-06-15 ENCOUNTER — TRANSFERRED RECORDS (OUTPATIENT)
Dept: HEALTH INFORMATION MANAGEMENT | Facility: CLINIC | Age: 50
End: 2020-06-15

## 2020-06-24 ENCOUNTER — APPOINTMENT (OUTPATIENT)
Dept: ULTRASOUND IMAGING | Facility: CLINIC | Age: 50
End: 2020-06-24
Attending: EMERGENCY MEDICINE
Payer: COMMERCIAL

## 2020-06-24 ENCOUNTER — HOSPITAL ENCOUNTER (EMERGENCY)
Facility: CLINIC | Age: 50
Discharge: HOME | End: 2020-06-24
Attending: EMERGENCY MEDICINE | Admitting: EMERGENCY MEDICINE
Payer: COMMERCIAL

## 2020-06-24 VITALS
BODY MASS INDEX: 30.31 KG/M2 | HEART RATE: 79 BPM | HEIGHT: 68 IN | RESPIRATION RATE: 18 BRPM | OXYGEN SATURATION: 100 % | TEMPERATURE: 97.9 F | WEIGHT: 200 LBS | DIASTOLIC BLOOD PRESSURE: 75 MMHG | SYSTOLIC BLOOD PRESSURE: 130 MMHG

## 2020-06-24 DIAGNOSIS — M79.661 RIGHT CALF PAIN: ICD-10-CM

## 2020-06-24 DIAGNOSIS — R79.89 ELEVATED D-DIMER: ICD-10-CM

## 2020-06-24 LAB — D DIMER PPP FEU-MCNC: 3.7 UG/ML FEU (ref 0–0.5)

## 2020-06-24 PROCEDURE — 93971 EXTREMITY STUDY: CPT | Mod: RT

## 2020-06-24 PROCEDURE — 99284 EMERGENCY DEPT VISIT MOD MDM: CPT | Mod: 25

## 2020-06-24 PROCEDURE — 85379 FIBRIN DEGRADATION QUANT: CPT | Performed by: EMERGENCY MEDICINE

## 2020-06-24 RX ORDER — CYCLOBENZAPRINE HCL 10 MG
10 TABLET ORAL 3 TIMES DAILY PRN
Qty: 15 TABLET | Refills: 0 | Status: SHIPPED | OUTPATIENT
Start: 2020-06-24 | End: 2021-08-20

## 2020-06-24 ASSESSMENT — MIFFLIN-ST. JEOR: SCORE: 1756.69

## 2020-06-24 NOTE — ED AVS SNAPSHOT
Emergency Department  [address] [place] [address]  Phone:  [phone]  Fax:  [phone]                                    Dustin Avila   MRN: [id number]    Department:   Emergency Department   Date of Visit:  6/24/2020           After Visit Summary Signature Page    I have received my discharge instructions, and my questions have been answered. I have discussed any challenges I see with this plan with the nurse or doctor.    ..........................................................................................................................................  Patient/Patient Representative Signature      ..........................................................................................................................................  Patient Representative Print Name and Relationship to Patient    ..................................................               ................................................  Date                                   Time    ..........................................................................................................................................  Reviewed by Signature/Title    ...................................................              ..............................................  Date                                               Time          22EPIC Rev 08/18

## 2020-06-24 NOTE — ED PROVIDER NOTES
History     Chief Complaint:    Leg Pain      HPI   Dustin Avila is a 49 year old male who presents with right calf pain that is been present for the last few days.  Pain seems worse at night.  He feels like there is a point of pain that almost is a lump in his mid calf.  He had right knee replacement 3 weeks ago.  He said the leg has been doing well since the surgery.  He has been up and ambulating.  He said however at night the calf pain kicks in, and he has throbbing discomfort which is made it difficult to sleep.  He said there is been some swelling around his knee.  He does not know if it is worse today than normal.  He denies any joint pain.  He has not had fever or chills.  He has been weaning himself down from his pain medicine.    At triage he complained of cough and shortness of breath.  When I discussed this with him he denied any signs or symptoms of COVID-19.  He denies any chest pain or pleuritic pain.  He said he thinks he coughed this morning because of smoking too much yesterday.  He said he felt short of breath because he was anxious about the possibility of having a blood clot.  He has none of these symptoms currently.    Allergies:    No Known Allergies     Medications:      cyclobenzaprine (FLEXERIL) 10 MG tablet  aspirin EC 81 MG EC tablet  atorvastatin (LIPITOR) 40 MG tablet  medical cannabis (Patient's own supply)  metoprolol succinate ER (TOPROL-XL) 25 MG 24 hr tablet  nitroGLYcerin (NITROSTAT) 0.4 MG sublingual tablet  spacer (OPTICHAMBER KAY) holding chamber        Problem List:      Patient Active Problem List    Diagnosis Date Noted     Status post coronary angiogram 11/19/2019     Priority: Medium     Abnormal cardiovascular stress test 11/06/2019     Priority: Medium     Added automatically from request for surgery [id number]       Old myocardial infarction 04/05/2017     Priority: Medium     Anxiety 03/13/2017     Priority: Medium     JOHN (generalized anxiety disorder) 11/15/2016      Priority: Medium     WPW (Joshua-Parkinson-White syndrome)      Priority: Medium     Coronary artery disease involving native coronary artery of native heart without angina pectoris      Priority: Medium     cardiac cath 2019: no intervention; cath 2016:  + PIERRE to OM1, PIERRE to LAD, PIERRE to ramus       Hyperlipidemia      Priority: Medium     Hyperlipidemia LDL goal <70 2016     Priority: Medium     Anomalous atrioventricular excitation 2016     Priority: Medium     As per history          Past Medical History:      Past Medical History:   Diagnosis Date     Achilles tendonitis      Ankle edema      Arthritis      Coronary artery disease      Hyperlipidemia      Incidental lung nodule      Osgood-Schlatter/osteochondroses      WPW (Joshua-Parkinson-White syndrome)        Past Surgical History:      Past Surgical History:   Procedure Laterality Date     ARTHROSCOPY KNEE  2012    Procedure:ARTHROSCOPY KNEE; left knee arthroscopy, partial medial and lateral meniscectomy ; Surgeon:MARIA M THOMAS; Location: OR     CV HEART CATHETERIZATION WITH POSSIBLE INTERVENTION N/A 2019    Procedure: Heart Catheterization with Possible Intervention;  Surgeon: Heydi Bro MD;  Location:  HEART CARDIAC CATH LAB     HERNIA REPAIR       ORTHOPEDIC SURGERY      left leg achilles rupture     ORTHOPEDIC SURGERY      right hip replacemnent     SEPTOPLASTY         Family History:      Family History   Problem Relation Age of Onset     Diabetes Father        Social History:    Marital Status:   [4]  Social History     Tobacco Use     Smoking status: Current Every Day Smoker     Packs/day: 0.25     Years: 35.00     Pack years: 8.75     Types: Vaping Device     Start date:      Last attempt to quit: 2019     Years since quittin.6     Smokeless tobacco: Never Used   Substance Use Topics     Alcohol use: Yes     Alcohol/week: 0.0 standard drinks     Comment: once weekly     Drug use:  "Yes     Types: Marijuana        Review of Systems  Positive as stated in the HPI, otherwise reviewed and negative    Physical Exam   First Vitals:  BP: 132/78  Pulse: 79  Temp: 97.9  F (36.6  C)  Resp: 16  Height: 172.7 cm (5' 8\")  Weight: 90.7 kg (200 lb)  SpO2: 100 %      Physical Exam  Constitutional:  Appears well-developed and well-nourished. Cooperative.   HENT:   Head:    Atraumatic.   Mouth/Throat:   Oropharynx is without erythema or exudate and mucous     membranes are moist.   Eyes:    Conjunctivae normal and EOM are normal.      Pupils are equal, round, and reactive to light.   Neck:    Normal range of motion. Neck supple.   Cardiovascular:  Normal rate, regular rhythm, normal heart sounds and radial and    dorsalis pedis pulses are 2+ and symmetric.    Pulmonary/Chest:  Effort normal and breath sounds normal.   Abdominal:   Soft. Bowel sounds are normal.      No splenomegaly or hepatomegaly. No tenderness. No rebound.   Musculoskeletal:  Normal range of motion.  Healing incision over the right knee.  There is mild swelling and effusion around the knee.  No tenderness or erythema.  Patient's able to fully flex and extend at the knee.  There is mild diffuse tenderness of the proximal mid posterior calf, without any overlying skin changes or palpable masses.  Trace edema to the right lower extremity.  Neurological:  Alert. Normal strength. No cranial nerve deficit. GCS 15  Skin:    Skin is warm and dry.   Psychiatric:   Normal mood and affect.     Emergency Department Course       Imaging:    US Lower Extremity Venous Duplex Right   Final Result   IMPRESSION: No evidence of thrombus in the major veins of the right lower extremity.            Radiographic findings were communicated with the patient who voiced understanding of the findings.    Laboratory:    D dimer 3.7 (H)      Emergency Department Course:    See MDM    Impression & Plan        Medical Decision Making:  Dustin Avila is a 49 year old male " who presents for evaluation of leg symptoms.  The workup in the Emergency Department indicates that there is no DVT.  His d-dimer is elevated, so if his Symptoms persist, he should have a repeat ultrasound in a week or so..  This was discussed with the patient.  There are no symptoms to suggest this has progressed to pulmonary embolism.  I suspect the calf pain is due to muscle spasm.  Patient is recently postop from knee replacement.  The joint itself looks good.  I do not see signs of infection.    Patient is given a prescription for some Flexeril.  He will discuss his calf pain and ongoing nightly throbbing knee discomfort with Dr. Post.    We discussed danger signs which should prompt a return to the ED and the patient voices understanding.       Critical Care time:  none    Diagnosis:    ICD-10-CM    1. Right calf pain  M79.661    2. Elevated d-dimer  R79.89        Disposition:  discharged to home    Discharge Medications:  New Prescriptions    CYCLOBENZAPRINE (FLEXERIL) 10 MG TABLET    Take 1 tablet (10 mg) by mouth 3 times daily as needed for muscle spasms       Nate Chawla MD  6/24/2020    EMERGENCY DEPARTMENT       Nate Chawla MD  06/24/20 0610

## 2020-06-24 NOTE — ED TRIAGE NOTES
Here with right leg pain. Had knee replacement recently. Concerned for blood clot in right leg. Did report a cough and some SOB but he was unsure if related to smoking 3 cigarettes today or to possible clot or covid

## 2020-06-29 ENCOUNTER — TRANSFERRED RECORDS (OUTPATIENT)
Dept: HEALTH INFORMATION MANAGEMENT | Facility: CLINIC | Age: 50
End: 2020-06-29

## 2020-07-06 ENCOUNTER — TRANSFERRED RECORDS (OUTPATIENT)
Dept: HEALTH INFORMATION MANAGEMENT | Facility: CLINIC | Age: 50
End: 2020-07-06

## 2020-08-04 ENCOUNTER — TRANSFERRED RECORDS (OUTPATIENT)
Dept: HEALTH INFORMATION MANAGEMENT | Facility: CLINIC | Age: 50
End: 2020-08-04

## 2020-08-20 ENCOUNTER — TRANSFERRED RECORDS (OUTPATIENT)
Dept: HEALTH INFORMATION MANAGEMENT | Facility: CLINIC | Age: 50
End: 2020-08-20

## 2020-09-18 ENCOUNTER — OFFICE VISIT (OUTPATIENT)
Dept: FAMILY MEDICINE | Facility: CLINIC | Age: 50
End: 2020-09-18
Payer: COMMERCIAL

## 2020-09-18 VITALS
OXYGEN SATURATION: 97 % | HEART RATE: 81 BPM | SYSTOLIC BLOOD PRESSURE: 128 MMHG | HEIGHT: 68 IN | BODY MASS INDEX: 28.04 KG/M2 | DIASTOLIC BLOOD PRESSURE: 76 MMHG | WEIGHT: 185 LBS | TEMPERATURE: 97.8 F

## 2020-09-18 DIAGNOSIS — Z01.818 PREOP GENERAL PHYSICAL EXAM: Primary | ICD-10-CM

## 2020-09-18 DIAGNOSIS — M17.12 ARTHRITIS OF LEFT KNEE: ICD-10-CM

## 2020-09-18 DIAGNOSIS — E78.5 HYPERLIPIDEMIA LDL GOAL <70: ICD-10-CM

## 2020-09-18 DIAGNOSIS — Z12.5 SCREENING FOR PROSTATE CANCER: Primary | ICD-10-CM

## 2020-09-18 DIAGNOSIS — I25.10 CORONARY ARTERY DISEASE INVOLVING NATIVE CORONARY ARTERY OF NATIVE HEART WITHOUT ANGINA PECTORIS: ICD-10-CM

## 2020-09-18 LAB
ERYTHROCYTE [DISTWIDTH] IN BLOOD BY AUTOMATED COUNT: 14.4 % (ref 10–15)
HCT VFR BLD AUTO: 44.3 % (ref 40–53)
HGB BLD-MCNC: 15.1 G/DL (ref 13.3–17.7)
MCH RBC QN AUTO: 30.4 PG (ref 26.5–33)
MCHC RBC AUTO-ENTMCNC: 34.1 G/DL (ref 31.5–36.5)
MCV RBC AUTO: 89 FL (ref 78–100)
PLATELET # BLD AUTO: 316 10E9/L (ref 150–450)
RBC # BLD AUTO: 4.96 10E12/L (ref 4.4–5.9)
WBC # BLD AUTO: 9.1 10E9/L (ref 4–11)

## 2020-09-18 PROCEDURE — 99215 OFFICE O/P EST HI 40 MIN: CPT | Performed by: NURSE PRACTITIONER

## 2020-09-18 PROCEDURE — 80048 BASIC METABOLIC PNL TOTAL CA: CPT | Performed by: NURSE PRACTITIONER

## 2020-09-18 PROCEDURE — 93000 ELECTROCARDIOGRAM COMPLETE: CPT | Performed by: NURSE PRACTITIONER

## 2020-09-18 PROCEDURE — 80061 LIPID PANEL: CPT | Performed by: NURSE PRACTITIONER

## 2020-09-18 PROCEDURE — G0103 PSA SCREENING: HCPCS | Performed by: NURSE PRACTITIONER

## 2020-09-18 PROCEDURE — 36415 COLL VENOUS BLD VENIPUNCTURE: CPT | Performed by: NURSE PRACTITIONER

## 2020-09-18 PROCEDURE — 85027 COMPLETE CBC AUTOMATED: CPT | Performed by: NURSE PRACTITIONER

## 2020-09-18 ASSESSMENT — MIFFLIN-ST. JEOR: SCORE: 1683.65

## 2020-09-18 NOTE — PATIENT INSTRUCTIONS
Be sure to take Metoprolol on the day of your surgery    Stop Aspirin one week before surgery and resume as soon as able afterward

## 2020-09-18 NOTE — PROGRESS NOTES
[address] [address]  Phone: [phone]  Primary Provider: Ravindra Liz  Pre-op Performing Provider: RAVINDRA LIZ    PREOPERATIVE EVALUATION:  Today's date: 9/18/2020    Dustin Avila is a 50 year old male who presents for a preoperative evaluation.    Surgical Information:  Surgery Details 9/18/2020   Surgery/Procedure: left kneee replacement   Surgery Location: [facility] [place]   Surgeon: Dr. Post   Surgery Date: 10/15/2020   Time of Surgery: 6:30 am   Where patient plans to recover: At home with family     Fax number for surgical facility: [phone]  Type of Anesthesia Anticipated: General    Subjective     HPI related to upcoming procedure:   Preop Questions 9/18/2020   1. Have you ever had a heart attack or stroke? YES  -    2. Have you ever had surgery on your heart or blood vessels, such as a stent placement, a coronary artery bypass, or surgery on an artery in your head, neck, heart, or legs? YES:    3. Do you have chest pain with activity? No   4. Do you have a history of  heart failure? No   5. Do you currently have a cold, bronchitis or symptoms of other infection? No   6. Do you have a cough, shortness of breath, or wheezing? No   7. Do you or anyone in your family have previous history of blood clots? No   8. Do you or does anyone in your family have a serious bleeding problem such as prolonged bleeding following surgeries or cuts? No   9. Have you ever had problems with anemia or been told to take iron pills? No   10. Have you had any abnormal blood loss such as black, tarry or bloody stools? No   11. Have you ever had a blood transfusion? No   Are you willing to have a blood transfusion if it is medically needed before, during, or after your surgery? Yes   13. Have you or any of your relatives ever had problems with anesthesia? No   14. Do you have sleep apnea, excessive snoring or daytime drowsiness? No    15. Do you have any artifical heart valves or other implanted medical devices like a pacemaker, defibrillator, or continuous glucose monitor? No   16. Do you have artificial joints? YES - right knee , hip    17. Are you allergic to latex? No     Patient does not have a Health Care Directive or Living Will: Discussed advance care planning with patient; however, patient declined at this time.    [place] ([place] reviewed:  reviewed- no concerns    See problem list for active medical problems.  Problems all longstanding and stable, except as noted/documented.  See ROS for pertinent symptoms related to these conditions.      Review of Systems  CONSTITUTIONAL: NEGATIVE for fever, chills, change in weight  INTEGUMENTARY/SKIN: NEGATIVE for worrisome rashes, moles or lesions  EYES: NEGATIVE for vision changes or irritation  ENT/MOUTH: NEGATIVE for ear, mouth and throat problems  RESP: NEGATIVE for significant cough or SOB  BREAST: NEGATIVE for masses, tenderness or discharge  CV: NEGATIVE for chest pain, palpitations or peripheral edema  GI: NEGATIVE for nausea, abdominal pain, heartburn, or change in bowel habits  : NEGATIVE for frequency, dysuria, or hematuria  MUSCULOSKELETAL: NEGATIVE for significant arthralgias or myalgia  NEURO: NEGATIVE for weakness, dizziness or paresthesias  ENDOCRINE: NEGATIVE for temperature intolerance, skin/hair changes  HEME: NEGATIVE for bleeding problems  PSYCHIATRIC: NEGATIVE for changes in mood or affect    Patient Active Problem List    Diagnosis Date Noted     Old myocardial infarction 04/05/2017     Priority: Medium     Anxiety 03/13/2017     Priority: Medium     JOHN (generalized anxiety disorder) 11/15/2016     Priority: Medium     WPW (Joshua-Parkinson-White syndrome)      Priority: Medium     Coronary artery disease involving native coronary artery of native heart without angina pectoris      Priority: Medium     cardiac cath 11/2019: no intervention; cath 2016:   + PIERRE to OM1, PIERRE to LAD, PIERRE to ramus       Hyperlipidemia      Priority: Medium     Hyperlipidemia LDL goal <70 06/17/2016     Priority: Medium     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     Priority: Medium     As per history        Past Medical History:   Diagnosis Date     Achilles tendonitis      Ankle edema      Arthritis      Coronary artery disease     cardiac cath 11/2019: no intervention; cath 2016:  + PIERRE to OM1, PIERRE to LAd, PIERRE to ramus     Hyperlipidemia      Incidental lung nodule      Osgood-Schlatter/osteochondroses     left     WPW (Joshua-Parkinson-White syndrome)      Past Surgical History:   Procedure Laterality Date     ARTHROSCOPY KNEE  5/8/2012    Procedure:ARTHROSCOPY KNEE; left knee arthroscopy, partial medial and lateral meniscectomy ; Surgeon:MARIA M THOMAS; Location: OR     CV HEART CATHETERIZATION WITH POSSIBLE INTERVENTION N/A 11/19/2019    Procedure: Heart Catheterization with Possible Intervention;  Surgeon: Heydi Bro MD;  Location:  HEART CARDIAC CATH LAB     HERNIA REPAIR       ORTHOPEDIC SURGERY      left leg achilles rupture     ORTHOPEDIC SURGERY      right hip replacemnent     SEPTOPLASTY       Current Outpatient Medications   Medication Sig Dispense Refill     aspirin EC 81 MG EC tablet Take 1 tablet (81 mg) by mouth daily 90 tablet 3     atorvastatin (LIPITOR) 40 MG tablet Take 1 tablet (40 mg) by mouth daily 90 tablet 3     medical cannabis (Patient's own supply) See Admin Instructions (The purpose of this order is to document that the patient reports taking medical cannabis.  This is not a prescription, and is not used to certify that the patient has a qualifying medical condition.)       metoprolol succinate ER (TOPROL-XL) 25 MG 24 hr tablet Take 1 tablet (25 mg) by mouth daily 90 tablet 3     nitroGLYcerin (NITROSTAT) 0.4 MG sublingual tablet PLACE 1 TAB TO TONGUE EVERY 5 MINS AS NEEDED-CHEST PAIN. CALL 911 AFTER 3 DOSES IF SYMPTOMS  "PERSIST 25 tablet 3     spacer (OPTICHAMBER KAY) holding chamber Use with albuterol inhaler 1 each 0     cyclobenzaprine (FLEXERIL) 10 MG tablet Take 1 tablet (10 mg) by mouth 3 times daily as needed for muscle spasms (Patient not taking: Reported on 2020) 15 tablet 0       No Known Allergies     Social History     Tobacco Use     Smoking status: Current Every Day Smoker     Packs/day: 0.25     Years: 35.00     Pack years: 8.75     Types: Vaping Device     Start date:      Last attempt to quit: 2019     Years since quittin.8     Smokeless tobacco: Never Used   Substance Use Topics     Alcohol use: Yes     Alcohol/week: 0.0 standard drinks     Comment: once weekly     Family History   Problem Relation Age of Onset     Diabetes Father      History   Drug Use     Types: Marijuana            Objective   /76   Pulse 81   Temp 97.8  F (36.6  C) (Tympanic)   Ht 1.727 m (5' 8\")   Wt 83.9 kg (185 lb)   SpO2 97%   BMI 28.13 kg/m    Physical Exam    GENERAL APPEARANCE: healthy, alert and no distress     EYES: EOMI,  PERRL     HENT: ear canals and TM's normal and nose and mouth without ulcers or lesions     NECK: no adenopathy, no asymmetry, masses, or scars and thyroid normal to palpation     RESP: lungs clear to auscultation - no rales, rhonchi or wheezes     CV: regular rates and rhythm, normal S1 S2, no S3 or S4 and no murmur, click or rub     ABDOMEN:  soft, nontender, no HSM or masses and bowel sounds normal     MS: extremities normal- no gross deformities noted, no evidence of inflammation in joints, FROM in all extremities.     SKIN: no suspicious lesions or rashes     NEURO: Normal strength and tone, sensory exam grossly normal, mentation intact and speech normal     PSYCH: mentation appears normal. and affect normal/bright     LYMPHATICS: No cervical adenopathy      PRE-OP Diagnostics:  Labs pending at this time.  Results will be reviewed when available.    Lab Results   Component " Value Date    WBC 9.1 09/18/2020     Lab Results   Component Value Date    RBC 4.96 09/18/2020     Lab Results   Component Value Date    HGB 15.1 09/18/2020     Lab Results   Component Value Date    HCT 44.3 09/18/2020     No components found for: MCT  Lab Results   Component Value Date    MCV 89 09/18/2020     Lab Results   Component Value Date    MCH 30.4 09/18/2020     Lab Results   Component Value Date    MCHC 34.1 09/18/2020     Lab Results   Component Value Date    RDW 14.4 09/18/2020     Lab Results   Component Value Date     09/18/2020     Last Comprehensive Metabolic Panel:  Sodium   Date Value Ref Range Status   09/18/2020 137 133 - 144 mmol/L Final     Potassium   Date Value Ref Range Status   09/18/2020 4.3 3.4 - 5.3 mmol/L Final     Chloride   Date Value Ref Range Status   09/18/2020 107 94 - 109 mmol/L Final     Carbon Dioxide   Date Value Ref Range Status   09/18/2020 24 20 - 32 mmol/L Final     Anion Gap   Date Value Ref Range Status   09/18/2020 6 3 - 14 mmol/L Final     Glucose   Date Value Ref Range Status   09/18/2020 88 70 - 99 mg/dL Final     Comment:     Fasting specimen     Urea Nitrogen   Date Value Ref Range Status   09/18/2020 12 7 - 30 mg/dL Final     Creatinine   Date Value Ref Range Status   09/18/2020 0.89 0.66 - 1.25 mg/dL Final     GFR Estimate   Date Value Ref Range Status   09/18/2020 >90 >60 mL/min/[1.73_m2] Final     Comment:     Non  GFR Calc  Starting 12/18/2018, serum creatinine based estimated GFR (eGFR) will be   calculated using the Chronic Kidney Disease Epidemiology Collaboration   (CKD-EPI) equation.       Calcium   Date Value Ref Range Status   09/18/2020 9.4 8.5 - 10.1 mg/dL Final         EKG: Normal Sinus Rhythm, normal axis, normal intervals, no acute ST/T changes c/w ischemia, no LVH by voltage criteria, unchanged from previous tracings         Assessment & Plan   The proposed surgical procedure is considered INTERMEDIATE risk.    REVISED  CARDIAC RISK INDEX  The patient has the following serious cardiovascular risks for perioperative complications:  Coronary Artery Disease (MI, positive stress test, angina, Qs on EKG) = 1 point    INTERPRETATION: 1 point: Class II (low risk - 0.9% complication rate)       Dustin was seen today for pre-op exam.    Diagnoses and all orders for this visit:    Preop general physical exam  -     EKG 12-lead complete w/read - Clinics  -     Basic metabolic panel  -     CBC with platelets    Arthritis of left knee    Coronary artery disease involving native coronary artery of native heart without angina pectoris        The patient has the following additional risks and recommendations for perioperative complications:      CARDIOVASCULAR:   - Cynthia has known CAD (NSTEMI in 2016). He had stent placement in the mid-LAD, ramus branch, and marginal branch of the circumflex artery. A nuclear medicine stress study was performed as part of pre-op clearance workup for a recent orthopedic surgery (right TKA) and the findings prompted follow up angiography, which revealed patent stents and no further need for angioplasty or stenting. He remained without symptoms and successfully underwent right TKA earlier this year. He is scheduled for cardiology visit next week for cardiology clearance. From my standpoint he is a good surgical candidate pending cardiology approval.      MEDICATION INSTRUCTIONS:    Anticoagulant or Antiplatelet Medication Use   - ASPIRIN: Discontinue aspirin 7-10 days prior to procedure to reduce bleeding risk. It should be resumed postoperatively.     CARDIAC Medications:   - BETA BLOCKERS: Continue taking on the day of surgery.    RECOMMENDATION:  APPROVAL GIVEN to proceed with proposed procedure, without further diagnostic evaluation.    Return in about 4 weeks (around 10/16/2020) for persistent or worsening symptoms.    Signed Electronically by: Wesly Liz NP    Copy of this evaluation report is provided  to requesting physician.    Preop [facility] Preop Guidelines    Revised Cardiac Risk Index

## 2020-09-19 LAB
ANION GAP SERPL CALCULATED.3IONS-SCNC: 6 MMOL/L (ref 3–14)
BUN SERPL-MCNC: 12 MG/DL (ref 7–30)
CALCIUM SERPL-MCNC: 9.4 MG/DL (ref 8.5–10.1)
CHLORIDE SERPL-SCNC: 107 MMOL/L (ref 94–109)
CHOLEST SERPL-MCNC: 125 MG/DL
CO2 SERPL-SCNC: 24 MMOL/L (ref 20–32)
CREAT SERPL-MCNC: 0.89 MG/DL (ref 0.66–1.25)
GFR SERPL CREATININE-BSD FRML MDRD: >90 ML/MIN/{1.73_M2}
GLUCOSE SERPL-MCNC: 88 MG/DL (ref 70–99)
HDLC SERPL-MCNC: 43 MG/DL
LDLC SERPL CALC-MCNC: 66 MG/DL
NONHDLC SERPL-MCNC: 82 MG/DL
POTASSIUM SERPL-SCNC: 4.3 MMOL/L (ref 3.4–5.3)
PSA SERPL-ACNC: 2.26 UG/L (ref 0–4)
SODIUM SERPL-SCNC: 137 MMOL/L (ref 133–144)
TRIGL SERPL-MCNC: 80 MG/DL

## 2020-09-23 ENCOUNTER — TELEPHONE (OUTPATIENT)
Dept: CARDIOLOGY | Facility: CLINIC | Age: 50
End: 2020-09-23

## 2020-09-24 ENCOUNTER — OFFICE VISIT (OUTPATIENT)
Dept: CARDIOLOGY | Facility: CLINIC | Age: 50
End: 2020-09-24
Payer: COMMERCIAL

## 2020-09-24 VITALS
DIASTOLIC BLOOD PRESSURE: 72 MMHG | HEIGHT: 68 IN | SYSTOLIC BLOOD PRESSURE: 107 MMHG | BODY MASS INDEX: 27.89 KG/M2 | HEART RATE: 78 BPM | WEIGHT: 184 LBS

## 2020-09-24 DIAGNOSIS — Z01.810 PRE-OPERATIVE CARDIOVASCULAR EXAMINATION: Primary | ICD-10-CM

## 2020-09-24 PROCEDURE — 99214 OFFICE O/P EST MOD 30 MIN: CPT | Performed by: INTERNAL MEDICINE

## 2020-09-24 ASSESSMENT — MIFFLIN-ST. JEOR: SCORE: 1679.12

## 2020-09-24 NOTE — LETTER
9/24/2020    Wesly Liz, NP  [address]  [place] [address]    RE: Dustin OLVERA Austin       Dear Colleague,    I had the pleasure of seeing Dustin Avila in the [facility].    HPI and Plan:     Mr. Dustin Avila was new to myself earlier this year when he was seen for preoperative evaluation (twice) before left total knee replacement.  He is 50 years old, has a history of coronary artery disease, prior coronary intervention, Joshua-Parkinson-White syndrome and is followed in this clinic by Charmaine Kinney and Lacey.  He is here for preoperative clearance for right total knee placement.    Mr. Avila denies any chest, neck, arm or back discomfort.  He denies any exertional dyspnea or new exertional intolerance.  After left total knee replacement surgery, he is now back on the treadmill 3-5 times weekly and exercises to a heart rate of 120 to 125 bpm.  He has also been swimming and feels quite well.  He is anxious to proceed with knee replacement surgery for his contralateral knee arthritis.  He has 12-lead EKGs from November 2019, February 2020 and September 2020 have demonstrated short CO intervals (110 ms) no ischemic ST segment changes or abnormal Q waves.  He has not recently experienced palpitations or syncope.  He has significant degenerative joint disease of both hips and knees which he attributed to aggressive dancing when he was younger.  He has undergone hip replacement on the right and earlier this year, left total knee replacement.      In 2016, Mr. Avila presented with a non-ST elevation myocardial infarct.  On coronary angiography, he was found to have multivessel disease and underwent stent placement in the mid-LAD, ramus branch and the marginal branch of the circumflex coronary artery.  He has discontinued tobacco.  He was without insurance for a time and was off statin therapy.  Now, he would like to complete his second knee surgery since his deductible  has been met.  Lipids this month (September 2020) indicated an LDL fraction of 66 mg/dL and an HDL of 43 mg/dL with normal triglycerides.    Mr. Avila continues on metoprolol XL 25 mg daily, atorvastatin and low-dose aspirin.  His last echocardiogram was during October 2019 and the left ventricular ejection fraction was low normal at 50 to 55% without regional wall motion abnormalities or clinically significant valvular disease.  During his preoperative evaluation with Dr. Rahman late last year, a stress nuclear study was performed and demonstrated a medium-sized and partially reversible inferior and inferoseptal defect with a mid to basal inferior scar and a small to moderate region of mild mark-infarction ischemia in the distal inferior, mid to basal inferoseptal and basal inferolateral walls.  A sequential coronary angiogram demonstrated patency of the prior stents in the mid-LAD, ramus branch and also in the circumflex marginal branch.  The RCA was patent while the first and third right posterolateral branches were occluded and filled by collaterals.  No intervention was recommended.    Exam:  This is a man in no apparent distress.  He was alert and oriented to person place and time.  The blood pressure was 107/72 mmHg.  The chest was clear to auscultation.  On cardiac auscultation, there was an S1 and an S2 without extra sounds or a murmur.  The rhythm was regular.    Assessment/Plan:  In assessment, Mr. Avila has a history of coronary artery disease and he is completely asymptomatic.  As noted above, he underwent reevaluation of his coronary anatomy less than a year ago without need for revascularization.  His knee surgery earlier this year was free of any cardiac complications.  From a cardiac standpoint, he can proceed with his planned second knee replacement surgery without further evaluation.  As discussed previously, there is a slightly increased risk of myocardial infarct associated with total knee  arthroplasty but he is on very appropriate medical therapy now without cardiac symptoms.  He previously stopped smoking tobacco and his risk factors including lipids and blood pressure are treated and controlled and he should do well from a cardiac standpoint.  If aspirin is again held for the surgery (planned October 15, 2020), he should restart low-dose aspirin as soon as able following the surgery.  He has normal left ventricular systolic performance.    Follow-up at one year has been previously scheduled with his primary cardiologist, Dr. Kinney.  If Dr. Kinney is not available, he has previously seen Dr. Rahman and could also follow with him.  In the interim, he will contact us if he has any adverse symptoms.         CURRENT MEDICATIONS:  Current Outpatient Medications   Medication Sig Dispense Refill     aspirin EC 81 MG EC tablet Take 1 tablet (81 mg) by mouth daily 90 tablet 3     atorvastatin (LIPITOR) 40 MG tablet Take 1 tablet (40 mg) by mouth daily 90 tablet 3     cyclobenzaprine (FLEXERIL) 10 MG tablet Take 1 tablet (10 mg) by mouth 3 times daily as needed for muscle spasms 15 tablet 0     medical cannabis (Patient's own supply) See Admin Instructions (The purpose of this order is to document that the patient reports taking medical cannabis.  This is not a prescription, and is not used to certify that the patient has a qualifying medical condition.)       metoprolol succinate ER (TOPROL-XL) 25 MG 24 hr tablet Take 1 tablet (25 mg) by mouth daily 90 tablet 3     nitroGLYcerin (NITROSTAT) 0.4 MG sublingual tablet PLACE 1 TAB TO TONGUE EVERY 5 MINS AS NEEDED-CHEST PAIN. CALL 911 AFTER 3 DOSES IF SYMPTOMS PERSIST 25 tablet 3     spacer (OPTICHAMBER KAY) holding chamber Use with albuterol inhaler 1 each 0       ALLERGIES   No Known Allergies    PAST MEDICAL HISTORY:  Past Medical History:   Diagnosis Date     Achilles tendonitis      Ankle edema      Arthritis      Coronary artery disease     cardiac  cath 2019: no intervention; cath 2016:  + PIERRE to OM1, PIERRE to LAd, PIERRE to ramus     Hyperlipidemia      Incidental lung nodule      Osgood-Schlatter/osteochondroses     left     WPW (Joshua-Parkinson-White syndrome)        PAST SURGICAL HISTORY:  Past Surgical History:   Procedure Laterality Date     ARTHROSCOPY KNEE  2012    Procedure:ARTHROSCOPY KNEE; left knee arthroscopy, partial medial and lateral meniscectomy ; Surgeon:MARIA M THOMAS; Location: OR     CV HEART CATHETERIZATION WITH POSSIBLE INTERVENTION N/A 2019    Procedure: Heart Catheterization with Possible Intervention;  Surgeon: Heydi Bro MD;  Location:  HEART CARDIAC CATH LAB     HERNIA REPAIR       ORTHOPEDIC SURGERY      left leg achilles rupture     ORTHOPEDIC SURGERY      right hip replacemnent     SEPTOPLASTY         FAMILY HISTORY:  Family History   Problem Relation Age of Onset     Diabetes Father        SOCIAL HISTORY:  Social History     Socioeconomic History     Marital status:      Spouse name: seperated     Number of children: 2     Years of education: None     Highest education level: None   Occupational History     Occupation: self     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   Social Needs     Financial resource strain: None     Food insecurity     Worry: None     Inability: None     Transportation needs     Medical: None     Non-medical: None   Tobacco Use     Smoking status: Current Every Day Smoker     Packs/day: 0.25     Years: 35.00     Pack years: 8.75     Types: Vaping Device     Start date:      Last attempt to quit: 2019     Years since quittin.8     Smokeless tobacco: Never Used   Substance and Sexual Activity     Alcohol use: Yes     Alcohol/week: 0.0 standard drinks     Comment: once weekly     Drug use: Yes     Types: Marijuana     Sexual activity: Yes     Partners: Female   Lifestyle     Physical activity     Days per week: None     Minutes per session: None     Stress: None   Relationships      "Social connections     Talks on phone: None     Gets together: None     Attends Rastafarian service: None     Active member of club or organization: None     Attends meetings of clubs or organizations: None     Relationship status: None     Intimate partner violence     Fear of current or ex partner: None     Emotionally abused: None     Physically abused: None     Forced sexual activity: None   Other Topics Concern     Parent/sibling w/ CABG, MI or angioplasty before 65F 55M? No      Service Not Asked     Blood Transfusions Not Asked     Caffeine Concern No     Occupational Exposure Not Asked     Hobby Hazards Not Asked     Sleep Concern Yes     Stress Concern Yes     Weight Concern Yes     Comment: weight loss     Special Diet Not Asked     Back Care Not Asked     Exercise Yes     Comment: cardiac rehab 3 days week     Bike Helmet Not Asked     Seat Belt Not Asked     Self-Exams Not Asked   Social History Narrative     None       Review of Systems:  Skin:  Negative       Eyes:  Positive for glasses reading  ENT:  Positive for tinnitus;hearing loss DJ's for last 30 years  Respiratory:  Negative (occasional with talking and need to take a deep breath)     Cardiovascular:  Negative   rare  Gastroenterology: Negative      Genitourinary:  Positive for nocturia    Musculoskeletal:  Positive for joint pain;joint stiffness;arthritis (bilateral knees. right knee replacement in a couple months)  Neurologic:  Positive for numbness or tingling of hands right pinky  Psychiatric:  Negative      Heme/Lymph/Imm:  Negative      Endocrine:  Negative        Physical Exam:  Vitals: /72   Pulse 78   Ht 1.727 m (5' 8\")   Wt 83.5 kg (184 lb)   BMI 27.98 kg/m      Constitutional:  cooperative, alert and oriented, well developed, well nourished, in no acute distress        Skin:  warm and dry to the touch          Head:  normocephalic        Eyes:  sclera white        Lymph:      ENT:           Neck:           Respiratory: "  normal breath sounds, clear to auscultation, normal A-P diameter, normal symmetry, normal respiratory excursion, no use of accessory muscles         Cardiac: regular rhythm, normal S1/S2, no S3 or S4, apical impulse not displaced, no murmurs, gallops or rubs                                                         GI:           Extremities and Muscular Skeletal:                 Neurological:  no gross motor deficits;affect appropriate        Psych:  Alert and Oriented x 3;affect appropriate, oriented to time, person and place          CC  No referring provider defined for this encounter.                    Thank you for allowing me to participate in the care of your patient.      Sincerely,     Prema Lopez MD     [facility]    cc:   No referring provider defined for this encounter.

## 2020-09-24 NOTE — PROGRESS NOTES
HPI and Plan:     Mr. Dustin Avila was new to myself earlier this year when he was seen for preoperative evaluation (twice) before left total knee replacement.  He is 50 years old, has a history of coronary artery disease, prior coronary intervention, Joshua-Parkinson-White syndrome and is followed in this clinic by Charmaine Kinney and Lacey.  He is here for preoperative clearance for right total knee placement.    Mr. Avila denies any chest, neck, arm or back discomfort.  He denies any exertional dyspnea or new exertional intolerance.  After left total knee replacement surgery, he is now back on the treadmill 3-5 times weekly and exercises to a heart rate of 120 to 125 bpm.  He has also been swimming and feels quite well.  He is anxious to proceed with knee replacement surgery for his contralateral knee arthritis.  He has 12-lead EKGs from November 2019, February 2020 and September 2020 have demonstrated short SD intervals (110 ms) no ischemic ST segment changes or abnormal Q waves.  He has not recently experienced palpitations or syncope.  He has significant degenerative joint disease of both hips and knees which he attributed to aggressive dancing when he was younger.  He has undergone hip replacement on the right and earlier this year, left total knee replacement.      In 2016, Mr. Avila presented with a non-ST elevation myocardial infarct.  On coronary angiography, he was found to have multivessel disease and underwent stent placement in the mid-LAD, ramus branch and the marginal branch of the circumflex coronary artery.  He has discontinued tobacco.  He was without insurance for a time and was off statin therapy.  Now, he would like to complete his second knee surgery since his deductible has been met.  Lipids this month (September 2020) indicated an LDL fraction of 66 mg/dL and an HDL of 43 mg/dL with normal triglycerides.    Mr. Avila continues on metoprolol XL 25 mg daily, atorvastatin and low-dose  aspirin.  His last echocardiogram was during October 2019 and the left ventricular ejection fraction was low normal at 50 to 55% without regional wall motion abnormalities or clinically significant valvular disease.  During his preoperative evaluation with Dr. Rahman late last year, a stress nuclear study was performed and demonstrated a medium-sized and partially reversible inferior and inferoseptal defect with a mid to basal inferior scar and a small to moderate region of mild mark-infarction ischemia in the distal inferior, mid to basal inferoseptal and basal inferolateral walls.  A sequential coronary angiogram demonstrated patency of the prior stents in the mid-LAD, ramus branch and also in the circumflex marginal branch.  The RCA was patent while the first and third right posterolateral branches were occluded and filled by collaterals.  No intervention was recommended.    Exam:  This is a man in no apparent distress.  He was alert and oriented to person place and time.  The blood pressure was 107/72 mmHg.  The chest was clear to auscultation.  On cardiac auscultation, there was an S1 and an S2 without extra sounds or a murmur.  The rhythm was regular.    Assessment/Plan:  In assessment, Mr. Avila has a history of coronary artery disease and he is completely asymptomatic.  As noted above, he underwent reevaluation of his coronary anatomy less than a year ago without need for revascularization.  His knee surgery earlier this year was free of any cardiac complications.  From a cardiac standpoint, he can proceed with his planned second knee replacement surgery without further evaluation.  As discussed previously, there is a slightly increased risk of myocardial infarct associated with total knee arthroplasty but he is on very appropriate medical therapy now without cardiac symptoms.  He previously stopped smoking tobacco and his risk factors including lipids and blood pressure are treated and controlled and he  should do well from a cardiac standpoint.  If aspirin is again held for the surgery (planned October 15, 2020), he should restart low-dose aspirin as soon as able following the surgery.  He has normal left ventricular systolic performance.    Follow-up at one year has been previously scheduled with his primary cardiologist, Dr. Kinney.  If Dr. Kinney is not available, he has previously seen Dr. Rahman and could also follow with him.  In the interim, he will contact us if he has any adverse symptoms.         CURRENT MEDICATIONS:  Current Outpatient Medications   Medication Sig Dispense Refill     aspirin EC 81 MG EC tablet Take 1 tablet (81 mg) by mouth daily 90 tablet 3     atorvastatin (LIPITOR) 40 MG tablet Take 1 tablet (40 mg) by mouth daily 90 tablet 3     cyclobenzaprine (FLEXERIL) 10 MG tablet Take 1 tablet (10 mg) by mouth 3 times daily as needed for muscle spasms 15 tablet 0     medical cannabis (Patient's own supply) See Admin Instructions (The purpose of this order is to document that the patient reports taking medical cannabis.  This is not a prescription, and is not used to certify that the patient has a qualifying medical condition.)       metoprolol succinate ER (TOPROL-XL) 25 MG 24 hr tablet Take 1 tablet (25 mg) by mouth daily 90 tablet 3     nitroGLYcerin (NITROSTAT) 0.4 MG sublingual tablet PLACE 1 TAB TO TONGUE EVERY 5 MINS AS NEEDED-CHEST PAIN. CALL 911 AFTER 3 DOSES IF SYMPTOMS PERSIST 25 tablet 3     spacer (OPTICHAMBER KAY) holding chamber Use with albuterol inhaler 1 each 0       ALLERGIES   No Known Allergies    PAST MEDICAL HISTORY:  Past Medical History:   Diagnosis Date     Achilles tendonitis      Ankle edema      Arthritis      Coronary artery disease     cardiac cath 11/2019: no intervention; cath 2016:  + PIERRE to OM1, PIERRE to LAd, PIERRE to ramus     Hyperlipidemia      Incidental lung nodule      Osgood-Schlatter/osteochondroses     left     WPW (Joshua-Parkinson-White syndrome)         PAST SURGICAL HISTORY:  Past Surgical History:   Procedure Laterality Date     ARTHROSCOPY KNEE  2012    Procedure:ARTHROSCOPY KNEE; left knee arthroscopy, partial medial and lateral meniscectomy ; Surgeon:MARIA M THOMAS; Location: OR     CV HEART CATHETERIZATION WITH POSSIBLE INTERVENTION N/A 2019    Procedure: Heart Catheterization with Possible Intervention;  Surgeon: Heydi Bro MD;  Location:  HEART CARDIAC CATH LAB     HERNIA REPAIR       ORTHOPEDIC SURGERY      left leg achilles rupture     ORTHOPEDIC SURGERY      right hip replacemnent     SEPTOPLASTY         FAMILY HISTORY:  Family History   Problem Relation Age of Onset     Diabetes Father        SOCIAL HISTORY:  Social History     Socioeconomic History     Marital status:      Spouse name: seperated     Number of children: 2     Years of education: None     Highest education level: None   Occupational History     Occupation: self     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   Social Needs     Financial resource strain: None     Food insecurity     Worry: None     Inability: None     Transportation needs     Medical: None     Non-medical: None   Tobacco Use     Smoking status: Current Every Day Smoker     Packs/day: 0.25     Years: 35.00     Pack years: 8.75     Types: Vaping Device     Start date:      Last attempt to quit: 2019     Years since quittin.8     Smokeless tobacco: Never Used   Substance and Sexual Activity     Alcohol use: Yes     Alcohol/week: 0.0 standard drinks     Comment: once weekly     Drug use: Yes     Types: Marijuana     Sexual activity: Yes     Partners: Female   Lifestyle     Physical activity     Days per week: None     Minutes per session: None     Stress: None   Relationships     Social connections     Talks on phone: None     Gets together: None     Attends Mormonism service: None     Active member of club or organization: None     Attends meetings of clubs or organizations: None     Relationship  "status: None     Intimate partner violence     Fear of current or ex partner: None     Emotionally abused: None     Physically abused: None     Forced sexual activity: None   Other Topics Concern     Parent/sibling w/ CABG, MI or angioplasty before 65F 55M? No      Service Not Asked     Blood Transfusions Not Asked     Caffeine Concern No     Occupational Exposure Not Asked     Hobby Hazards Not Asked     Sleep Concern Yes     Stress Concern Yes     Weight Concern Yes     Comment: weight loss     Special Diet Not Asked     Back Care Not Asked     Exercise Yes     Comment: cardiac rehab 3 days week     Bike Helmet Not Asked     Seat Belt Not Asked     Self-Exams Not Asked   Social History Narrative     None       Review of Systems:  Skin:  Negative       Eyes:  Positive for glasses reading  ENT:  Positive for tinnitus;hearing loss DJ's for last 30 years  Respiratory:  Negative (occasional with talking and need to take a deep breath)     Cardiovascular:  Negative   rare  Gastroenterology: Negative      Genitourinary:  Positive for nocturia    Musculoskeletal:  Positive for joint pain;joint stiffness;arthritis (bilateral knees. right knee replacement in a couple months)  Neurologic:  Positive for numbness or tingling of hands right pinky  Psychiatric:  Negative      Heme/Lymph/Imm:  Negative      Endocrine:  Negative        Physical Exam:  Vitals: /72   Pulse 78   Ht 1.727 m (5' 8\")   Wt 83.5 kg (184 lb)   BMI 27.98 kg/m      Constitutional:  cooperative, alert and oriented, well developed, well nourished, in no acute distress        Skin:  warm and dry to the touch          Head:  normocephalic        Eyes:  sclera white        Lymph:      ENT:           Neck:           Respiratory:  normal breath sounds, clear to auscultation, normal A-P diameter, normal symmetry, normal respiratory excursion, no use of accessory muscles         Cardiac: regular rhythm, normal S1/S2, no S3 or S4, apical impulse not " displaced, no murmurs, gallops or rubs                                                         GI:           Extremities and Muscular Skeletal:                 Neurological:  no gross motor deficits;affect appropriate        Psych:  Alert and Oriented x 3;affect appropriate, oriented to time, person and place          CC  No referring provider defined for this encounter.

## 2020-09-24 NOTE — LETTER
9/24/2020    Wesly Liz, NP  [address]  [place] [address]    RE: Dustin OLVERA Austin       Dear Colleague,    I had the pleasure of seeing Dustin Avila in the [facility].    HPI and Plan:     Mr. Dustin Avila was new to myself earlier this year when he was seen for preoperative evaluation (twice) before left total knee replacement.  He is 50 years old, has a history of coronary artery disease, prior coronary intervention, Joshua-Parkinson-White syndrome and is followed in this clinic by Charmaine Kinney and [place].  He is here for preoperative clearance for right total knee placement.    Mr. Avila denies any chest, neck, arm or back discomfort.  He denies any exertional dyspnea or new exertional intolerance.  After left total knee replacement surgery, he is now back on the treadmill 3-5 times weekly and exercises to a heart rate of 120 to 125 bpm.  He has also been swimming and feels quite well.  He is anxious to proceed with knee replacement surgery for his contralateral knee arthritis.  He has 12-lead EKGs from November 2019, February 2020 and September 2020 have demonstrated short MS intervals (110 ms) no ischemic ST segment changes or abnormal Q waves.  He has not recently experienced palpitations or syncope.  He has significant degenerative joint disease of both hips and knees which he attributed to aggressive dancing when he was younger.  He has undergone hip replacement on the right and earlier this year, left total knee replacement.      In 2016, Mr. Avila presented with a non-ST elevation myocardial infarct.  On coronary angiography, he was found to have multivessel disease and underwent stent placement in the mid-LAD, ramus branch and the marginal branch of the circumflex coronary artery.  He has discontinued tobacco.  He was without insurance for a time and was off statin therapy.  Now, he would like to complete his second knee surgery since his deductible  has been met.  Lipids this month (September 2020) indicated an LDL fraction of 66 mg/dL and an HDL of 43 mg/dL with normal triglycerides.    Mr. Avila continues on metoprolol XL 25 mg daily, atorvastatin and low-dose aspirin.  His last echocardiogram was during October 2019 and the left ventricular ejection fraction was low normal at 50 to 55% without regional wall motion abnormalities or clinically significant valvular disease.  During his preoperative evaluation with Dr. Rahman late last year, a stress nuclear study was performed and demonstrated a medium-sized and partially reversible inferior and inferoseptal defect with a mid to basal inferior scar and a small to moderate region of mild mark-infarction ischemia in the distal inferior, mid to basal inferoseptal and basal inferolateral walls.  A sequential coronary angiogram demonstrated patency of the prior stents in the mid-LAD, ramus branch and also in the circumflex marginal branch.  The RCA was patent while the first and third right posterolateral branches were occluded and filled by collaterals.  No intervention was recommended.    Exam:  This is a man in no apparent distress.  He was alert and oriented to person place and time.  The blood pressure was 107/72 mmHg.  The chest was clear to auscultation.  On cardiac auscultation, there was an S1 and an S2 without extra sounds or a murmur.  The rhythm was regular.    Assessment/Plan:  In assessment, Mr. Avila has a history of coronary artery disease and he is completely asymptomatic.  As noted above, he underwent reevaluation of his coronary anatomy less than a year ago without need for revascularization.  His knee surgery earlier this year was free of any cardiac complications.  From a cardiac standpoint, he can proceed with his planned second knee replacement surgery without further evaluation.  As discussed previously, there is a slightly increased risk of myocardial infarct associated with total knee  arthroplasty but he is on very appropriate medical therapy now without cardiac symptoms.  He previously stopped smoking tobacco and his risk factors including lipids and blood pressure are treated and controlled and he should do well from a cardiac standpoint.  If aspirin is again held for the surgery (planned October 15, 2020), he should restart low-dose aspirin as soon as able following the surgery.  He has normal left ventricular systolic performance.    Follow-up at one year has been previously scheduled with his primary cardiologist, Dr. Kinney.  If Dr. Kinney is not available, he has previously seen Dr. Rahman and could also follow with him.  In the interim, he will contact us if he has any adverse symptoms.         CURRENT MEDICATIONS:  Current Outpatient Medications   Medication Sig Dispense Refill     aspirin EC 81 MG EC tablet Take 1 tablet (81 mg) by mouth daily 90 tablet 3     atorvastatin (LIPITOR) 40 MG tablet Take 1 tablet (40 mg) by mouth daily 90 tablet 3     cyclobenzaprine (FLEXERIL) 10 MG tablet Take 1 tablet (10 mg) by mouth 3 times daily as needed for muscle spasms 15 tablet 0     medical cannabis (Patient's own supply) See Admin Instructions (The purpose of this order is to document that the patient reports taking medical cannabis.  This is not a prescription, and is not used to certify that the patient has a qualifying medical condition.)       metoprolol succinate ER (TOPROL-XL) 25 MG 24 hr tablet Take 1 tablet (25 mg) by mouth daily 90 tablet 3     nitroGLYcerin (NITROSTAT) 0.4 MG sublingual tablet PLACE 1 TAB TO TONGUE EVERY 5 MINS AS NEEDED-CHEST PAIN. CALL 911 AFTER 3 DOSES IF SYMPTOMS PERSIST 25 tablet 3     spacer (OPTICHAMBER KAY) holding chamber Use with albuterol inhaler 1 each 0       ALLERGIES   No Known Allergies    PAST MEDICAL HISTORY:  Past Medical History:   Diagnosis Date     Achilles tendonitis      Ankle edema      Arthritis      Coronary artery disease     cardiac  cath 2019: no intervention; cath 2016:  + PIERRE to OM1, PIERRE to LAd, PIERRE to ramus     Hyperlipidemia      Incidental lung nodule      Osgood-Schlatter/osteochondroses     left     WPW (Joshua-Parkinson-White syndrome)        PAST SURGICAL HISTORY:  Past Surgical History:   Procedure Laterality Date     ARTHROSCOPY KNEE  2012    Procedure:ARTHROSCOPY KNEE; left knee arthroscopy, partial medial and lateral meniscectomy ; Surgeon:MARIA M THOMAS; Location: OR     CV HEART CATHETERIZATION WITH POSSIBLE INTERVENTION N/A 2019    Procedure: Heart Catheterization with Possible Intervention;  Surgeon: Heydi Bro MD;  Location:  HEART CARDIAC CATH LAB     HERNIA REPAIR       ORTHOPEDIC SURGERY      left leg achilles rupture     ORTHOPEDIC SURGERY      right hip replacemnent     SEPTOPLASTY         FAMILY HISTORY:  Family History   Problem Relation Age of Onset     Diabetes Father        SOCIAL HISTORY:  Social History     Socioeconomic History     Marital status:      Spouse name: seperated     Number of children: 2     Years of education: None     Highest education level: None   Occupational History     Occupation: self     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   Social Needs     Financial resource strain: None     Food insecurity     Worry: None     Inability: None     Transportation needs     Medical: None     Non-medical: None   Tobacco Use     Smoking status: Current Every Day Smoker     Packs/day: 0.25     Years: 35.00     Pack years: 8.75     Types: Vaping Device     Start date:      Last attempt to quit: 2019     Years since quittin.8     Smokeless tobacco: Never Used   Substance and Sexual Activity     Alcohol use: Yes     Alcohol/week: 0.0 standard drinks     Comment: once weekly     Drug use: Yes     Types: Marijuana     Sexual activity: Yes     Partners: Female   Lifestyle     Physical activity     Days per week: None     Minutes per session: None     Stress: None   Relationships      "Social connections     Talks on phone: None     Gets together: None     Attends Anabaptist service: None     Active member of club or organization: None     Attends meetings of clubs or organizations: None     Relationship status: None     Intimate partner violence     Fear of current or ex partner: None     Emotionally abused: None     Physically abused: None     Forced sexual activity: None   Other Topics Concern     Parent/sibling w/ CABG, MI or angioplasty before 65F 55M? No      Service Not Asked     Blood Transfusions Not Asked     Caffeine Concern No     Occupational Exposure Not Asked     Hobby Hazards Not Asked     Sleep Concern Yes     Stress Concern Yes     Weight Concern Yes     Comment: weight loss     Special Diet Not Asked     Back Care Not Asked     Exercise Yes     Comment: cardiac rehab 3 days week     Bike Helmet Not Asked     Seat Belt Not Asked     Self-Exams Not Asked   Social History Narrative     None       Review of Systems:  Skin:  Negative       Eyes:  Positive for glasses reading  ENT:  Positive for tinnitus;hearing loss DJ's for last 30 years  Respiratory:  Negative (occasional with talking and need to take a deep breath)     Cardiovascular:  Negative   rare  Gastroenterology: Negative      Genitourinary:  Positive for nocturia    Musculoskeletal:  Positive for joint pain;joint stiffness;arthritis (bilateral knees. right knee replacement in a couple months)  Neurologic:  Positive for numbness or tingling of hands right pinky  Psychiatric:  Negative      Heme/Lymph/Imm:  Negative      Endocrine:  Negative        Physical Exam:  Vitals: /72   Pulse 78   Ht 1.727 m (5' 8\")   Wt 83.5 kg (184 lb)   BMI 27.98 kg/m      Constitutional:  cooperative, alert and oriented, well developed, well nourished, in no acute distress        Skin:  warm and dry to the touch          Head:  normocephalic        Eyes:  sclera white        Lymph:      ENT:           Neck:           Respiratory: "  normal breath sounds, clear to auscultation, normal A-P diameter, normal symmetry, normal respiratory excursion, no use of accessory muscles         Cardiac: regular rhythm, normal S1/S2, no S3 or S4, apical impulse not displaced, no murmurs, gallops or rubs                                                         GI:           Extremities and Muscular Skeletal:                 Neurological:  no gross motor deficits;affect appropriate        Psych:  Alert and Oriented x 3;affect appropriate, oriented to time, person and place          Thank you for allowing me to participate in the care of your patient.    Sincerely,     Prema Lopez MD     [facility]

## 2020-10-15 ENCOUNTER — TRANSFERRED RECORDS (OUTPATIENT)
Dept: HEALTH INFORMATION MANAGEMENT | Facility: CLINIC | Age: 50
End: 2020-10-15

## 2020-10-26 DIAGNOSIS — I25.10 CORONARY ARTERY DISEASE INVOLVING NATIVE CORONARY ARTERY OF NATIVE HEART WITHOUT ANGINA PECTORIS: ICD-10-CM

## 2020-10-27 RX ORDER — NITROGLYCERIN 0.4 MG/1
TABLET SUBLINGUAL
Qty: 25 TABLET | Refills: 3 | Status: SHIPPED | OUTPATIENT
Start: 2020-10-27 | End: 2021-12-03

## 2020-10-29 ENCOUNTER — TRANSFERRED RECORDS (OUTPATIENT)
Dept: HEALTH INFORMATION MANAGEMENT | Facility: CLINIC | Age: 50
End: 2020-10-29

## 2020-11-06 ENCOUNTER — TRANSFERRED RECORDS (OUTPATIENT)
Dept: HEALTH INFORMATION MANAGEMENT | Facility: CLINIC | Age: 50
End: 2020-11-06

## 2020-11-07 ENCOUNTER — HEALTH MAINTENANCE LETTER (OUTPATIENT)
Age: 50
End: 2020-11-07

## 2020-11-09 DIAGNOSIS — I25.2 OLD MYOCARDIAL INFARCTION: ICD-10-CM

## 2020-11-09 DIAGNOSIS — I45.6 WPW (WOLFF-PARKINSON-WHITE SYNDROME): ICD-10-CM

## 2020-11-09 DIAGNOSIS — I25.10 CORONARY ARTERY DISEASE INVOLVING NATIVE CORONARY ARTERY OF NATIVE HEART WITHOUT ANGINA PECTORIS: ICD-10-CM

## 2020-11-09 RX ORDER — ATORVASTATIN CALCIUM 40 MG/1
40 TABLET, FILM COATED ORAL DAILY
Qty: 90 TABLET | Refills: 3 | Status: SHIPPED | OUTPATIENT
Start: 2020-11-09 | End: 2021-08-20

## 2020-11-10 ENCOUNTER — TRANSFERRED RECORDS (OUTPATIENT)
Dept: HEALTH INFORMATION MANAGEMENT | Facility: CLINIC | Age: 50
End: 2020-11-10

## 2020-11-10 DIAGNOSIS — I25.10 CORONARY ARTERY DISEASE INVOLVING NATIVE CORONARY ARTERY OF NATIVE HEART WITHOUT ANGINA PECTORIS: ICD-10-CM

## 2020-11-10 RX ORDER — METOPROLOL SUCCINATE 25 MG/1
25 TABLET, EXTENDED RELEASE ORAL DAILY
Qty: 90 TABLET | Refills: 3 | Status: SHIPPED | OUTPATIENT
Start: 2020-11-10 | End: 2021-11-24

## 2020-11-23 ENCOUNTER — TRANSFERRED RECORDS (OUTPATIENT)
Dept: HEALTH INFORMATION MANAGEMENT | Facility: CLINIC | Age: 50
End: 2020-11-23

## 2020-12-04 ENCOUNTER — OFFICE VISIT (OUTPATIENT)
Dept: FAMILY MEDICINE | Facility: CLINIC | Age: 50
End: 2020-12-04
Payer: COMMERCIAL

## 2020-12-04 ENCOUNTER — TELEPHONE (OUTPATIENT)
Dept: FAMILY MEDICINE | Facility: CLINIC | Age: 50
End: 2020-12-04

## 2020-12-04 VITALS
BODY MASS INDEX: 27 KG/M2 | OXYGEN SATURATION: 99 % | HEART RATE: 80 BPM | SYSTOLIC BLOOD PRESSURE: 108 MMHG | DIASTOLIC BLOOD PRESSURE: 80 MMHG | TEMPERATURE: 97.8 F | WEIGHT: 172 LBS | HEIGHT: 67 IN

## 2020-12-04 DIAGNOSIS — I45.6 WPW (WOLFF-PARKINSON-WHITE SYNDROME): ICD-10-CM

## 2020-12-04 DIAGNOSIS — M16.12 PRIMARY OSTEOARTHRITIS OF LEFT HIP: ICD-10-CM

## 2020-12-04 DIAGNOSIS — R35.1 NOCTURIA: Primary | ICD-10-CM

## 2020-12-04 DIAGNOSIS — I25.10 CORONARY ARTERY DISEASE INVOLVING NATIVE CORONARY ARTERY OF NATIVE HEART WITHOUT ANGINA PECTORIS: Chronic | ICD-10-CM

## 2020-12-04 DIAGNOSIS — Z01.818 PREOP GENERAL PHYSICAL EXAM: Primary | ICD-10-CM

## 2020-12-04 LAB
ANION GAP SERPL CALCULATED.3IONS-SCNC: 3 MMOL/L (ref 3–14)
BUN SERPL-MCNC: 14 MG/DL (ref 7–30)
CALCIUM SERPL-MCNC: 9.8 MG/DL (ref 8.5–10.1)
CHLORIDE SERPL-SCNC: 109 MMOL/L (ref 94–109)
CO2 SERPL-SCNC: 28 MMOL/L (ref 20–32)
CREAT SERPL-MCNC: 0.88 MG/DL (ref 0.66–1.25)
ERYTHROCYTE [DISTWIDTH] IN BLOOD BY AUTOMATED COUNT: 14.4 % (ref 10–15)
GFR SERPL CREATININE-BSD FRML MDRD: >90 ML/MIN/{1.73_M2}
GLUCOSE SERPL-MCNC: 91 MG/DL (ref 70–99)
HCT VFR BLD AUTO: 42.7 % (ref 40–53)
HGB BLD-MCNC: 14.1 G/DL (ref 13.3–17.7)
MCH RBC QN AUTO: 31 PG (ref 26.5–33)
MCHC RBC AUTO-ENTMCNC: 33 G/DL (ref 31.5–36.5)
MCV RBC AUTO: 94 FL (ref 78–100)
PLATELET # BLD AUTO: 349 10E9/L (ref 150–450)
POTASSIUM SERPL-SCNC: 4.4 MMOL/L (ref 3.4–5.3)
RBC # BLD AUTO: 4.55 10E12/L (ref 4.4–5.9)
SODIUM SERPL-SCNC: 140 MMOL/L (ref 133–144)
WBC # BLD AUTO: 8.6 10E9/L (ref 4–11)

## 2020-12-04 PROCEDURE — 80048 BASIC METABOLIC PNL TOTAL CA: CPT | Performed by: NURSE PRACTITIONER

## 2020-12-04 PROCEDURE — 99215 OFFICE O/P EST HI 40 MIN: CPT | Performed by: NURSE PRACTITIONER

## 2020-12-04 PROCEDURE — 85027 COMPLETE CBC AUTOMATED: CPT | Performed by: NURSE PRACTITIONER

## 2020-12-04 PROCEDURE — 36415 COLL VENOUS BLD VENIPUNCTURE: CPT | Performed by: NURSE PRACTITIONER

## 2020-12-04 ASSESSMENT — MIFFLIN-ST. JEOR: SCORE: 1608.82

## 2020-12-04 NOTE — TELEPHONE ENCOUNTER
preop has been faxed to both numbers.    .Giuliana LAKE    [facility] [place]

## 2020-12-04 NOTE — PROGRESS NOTES
[address] [address]  Phone: [phone]  Primary Provider: Ravindra Rowley  Pre-op Performing Provider: RAVINDRA ROWLEY    PREOPERATIVE EVALUATION:  Today's date: 12/4/2020    Dustin Avila is a 50 year old male who presents for a preoperative evaluation.    Surgical Information:  Surgery/Procedure: left hip replacement   Surgery Location: [facility]   Surgeon: Dustin Dawkins   Surgery Date: 12/14/2020  Time of Surgery: unknown   Where patient plans to recover: At home with family  Fax number for surgical facility: patient will call us with fax number     Type of Anesthesia Anticipated: General    Subjective     HPI related to upcoming procedure: Advanced OA in multiple joints. Has had both knees replaced and his right hip. Will have FELICITAS (left) on 12/14.       Preop Questions 12/4/2020   1. Have you ever had a heart attack or stroke? YES - heart attack June 2016   2. Have you ever had surgery on your heart or blood vessels, such as a stent placement, a coronary artery bypass, or surgery on an artery in your head, neck, heart, or legs? YES -    3. Do you have chest pain with activity? No   4. Do you have a history of  heart failure? No   5. Do you currently have a cold, bronchitis or symptoms of other infection? No   6. Do you have a cough, shortness of breath, or wheezing? No   7. Do you or anyone in your family have previous history of blood clots? No   8. Do you or does anyone in your family have a serious bleeding problem such as prolonged bleeding following surgeries or cuts? No   9. Have you ever had problems with anemia or been told to take iron pills? No   10. Have you had any abnormal blood loss such as black, tarry or bloody stools? No   11. Have you ever had a blood transfusion? No   12. Are you willing to have a blood transfusion if it is medically needed before, during, or after your surgery? Yes   13. Have you or any of  your relatives ever had problems with anesthesia? No   14. Do you have sleep apnea, excessive snoring or daytime drowsiness? No   15. Do you have any artifical heart valves or other implanted medical devices like a pacemaker, defibrillator, or continuous glucose monitor? No   16. Do you have artificial joints? YES - right hip and bilateral knees   17. Are you allergic to latex? No     Health Care Directive:  Patient does not have a Health Care Directive or Living Will: Discussed advance care planning with patient; information given to patient to review.    Preoperative Review of :   reviewed - no record of controlled substances prescribed.    Status of Chronic Conditions:  See problem list for active medical problems.  Problems all longstanding and stable, except as noted/documented.  See ROS for pertinent symptoms related to these conditions.      Review of Systems  CONSTITUTIONAL: NEGATIVE for fever, chills, change in weight  INTEGUMENTARY/SKIN: NEGATIVE for worrisome rashes, moles or lesions  EYES: NEGATIVE for vision changes or irritation  ENT/MOUTH: NEGATIVE for ear, mouth and throat problems  RESP: NEGATIVE for significant cough or SOB  BREAST: NEGATIVE for masses, tenderness or discharge  CV: NEGATIVE for chest pain, palpitations or peripheral edema  GI: NEGATIVE for nausea, abdominal pain, heartburn, or change in bowel habits  : NEGATIVE for frequency, dysuria, or hematuria  MUSCULOSKELETAL: NEGATIVE for significant arthralgias or myalgia  NEURO: NEGATIVE for weakness, dizziness or paresthesias  ENDOCRINE: NEGATIVE for temperature intolerance, skin/hair changes  HEME: NEGATIVE for bleeding problems  PSYCHIATRIC: NEGATIVE for changes in mood or affect    Patient Active Problem List    Diagnosis Date Noted     Old myocardial infarction 04/05/2017     Priority: Medium     Anxiety 03/13/2017     Priority: Medium     JOHN (generalized anxiety disorder) 11/15/2016     Priority: Medium     WPW  (Joshua-Parkinson-White syndrome)      Priority: Medium     Coronary artery disease involving native coronary artery of native heart without angina pectoris      Priority: Medium     cardiac cath 11/2019: no intervention; cath 2016:  + PIERRE to OM1, PIERRE to LAD, PIERRE to ramus       Hyperlipidemia      Priority: Medium     Hyperlipidemia LDL goal <70 06/17/2016     Priority: Medium     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     Priority: Medium     As per history        Past Medical History:   Diagnosis Date     Achilles tendonitis      Ankle edema      Arthritis      Coronary artery disease     cardiac cath 11/2019: no intervention; cath 2016:  + PIERRE to OM1, PIERRE to LAd, PIERRE to ramus     Hyperlipidemia      Incidental lung nodule      Osgood-Schlatter/osteochondroses     left     WPW (Joshua-Parkinson-White syndrome)      Past Surgical History:   Procedure Laterality Date     ARTHROSCOPY KNEE  5/8/2012    Procedure:ARTHROSCOPY KNEE; left knee arthroscopy, partial medial and lateral meniscectomy ; Surgeon:MARIA M THOMAS; Location: OR     CV HEART CATHETERIZATION WITH POSSIBLE INTERVENTION N/A 11/19/2019    Procedure: Heart Catheterization with Possible Intervention;  Surgeon: Heydi Bro MD;  Location:  HEART CARDIAC CATH LAB     HERNIA REPAIR       ORTHOPEDIC SURGERY      left leg achilles rupture     ORTHOPEDIC SURGERY      right hip replacemnent     SEPTOPLASTY       Current Outpatient Medications   Medication Sig Dispense Refill     aspirin EC 81 MG EC tablet Take 1 tablet (81 mg) by mouth daily 90 tablet 3     atorvastatin (LIPITOR) 40 MG tablet Take 1 tablet (40 mg) by mouth daily 90 tablet 3     medical cannabis (Patient's own supply) See Admin Instructions (The purpose of this order is to document that the patient reports taking medical cannabis.  This is not a prescription, and is not used to certify that the patient has a qualifying medical condition.)       metoprolol succinate ER  "(TOPROL-XL) 25 MG 24 hr tablet Take 1 tablet (25 mg) by mouth daily 90 tablet 3     cyclobenzaprine (FLEXERIL) 10 MG tablet Take 1 tablet (10 mg) by mouth 3 times daily as needed for muscle spasms (Patient not taking: Reported on 2020) 15 tablet 0     nitroGLYcerin (NITROSTAT) 0.4 MG sublingual tablet PLACE 1 TAB TO TONGUE EVERY 5 MINS AS NEEDED-CHEST PAIN. CALL 911 AFTER 3 DOSES IF SYMPTOMS PERSIST (Patient not taking: Reported on 2020) 25 tablet 3     spacer (OPTICHAMBER KAY) holding chamber Use with albuterol inhaler (Patient not taking: Reported on 2020) 1 each 0       No Known Allergies     Social History     Tobacco Use     Smoking status: Current Every Day Smoker     Packs/day: 0.25     Years: 35.00     Pack years: 8.75     Types: Vaping Device     Start date:      Last attempt to quit: 2019     Years since quittin.0     Smokeless tobacco: Never Used   Substance Use Topics     Alcohol use: Not Currently     Alcohol/week: 0.0 standard drinks     Comment: once weekly     Family History   Problem Relation Age of Onset     Diabetes Father      History   Drug Use     Types: Marijuana         Objective     /80   Pulse 80   Temp 97.8  F (36.6  C) (Tympanic)   Ht 1.702 m (5' 7\")   Wt 78 kg (172 lb)   SpO2 99%   BMI 26.94 kg/m      Physical Exam    GENERAL APPEARANCE: healthy, alert and no distress     EYES: EOMI,  PERRL     HENT: ear canals and TM's normal and nose and mouth without ulcers or lesions     NECK: no adenopathy, no asymmetry, masses, or scars and thyroid normal to palpation     RESP: lungs clear to auscultation - no rales, rhonchi or wheezes     CV: regular rates and rhythm, normal S1 S2, no S3 or S4 and no murmur, click or rub     ABDOMEN:  soft, nontender, no HSM or masses and bowel sounds normal     MS: extremities normal- no gross deformities noted, no evidence of inflammation in joints, FROM in all extremities.     SKIN: no suspicious lesions or rashes     " NEURO: Normal strength and tone, sensory exam grossly normal, mentation intact and speech normal     PSYCH: mentation appears normal. and affect normal/bright     LYMPHATICS: No cervical adenopathy    Recent Labs   Lab Test 09/18/20  1218 05/21/20  1406 03/09/20  1652 02/27/20  0819 02/27/20  0819   HGB 15.1 15.7  --    < > 14.9    258  --    < > 289   INR  --   --  0.96  --  0.91    136  --    < > 139   POTASSIUM 4.3 4.3  --    < > 4.3   CR 0.89 0.83  --    < > 0.72    < > = values in this interval not displayed.        Diagnostics:  Recent Results (from the past 48 hour(s))   CBC with platelets    Collection Time: 12/04/20  7:35 AM   Result Value Ref Range    WBC 8.6 4.0 - 11.0 10e9/L    RBC Count 4.55 4.4 - 5.9 10e12/L    Hemoglobin 14.1 13.3 - 17.7 g/dL    Hematocrit 42.7 40.0 - 53.0 %    MCV 94 78 - 100 fl    MCH 31.0 26.5 - 33.0 pg    MCHC 33.0 31.5 - 36.5 g/dL    RDW 14.4 10.0 - 15.0 %    Platelet Count 349 150 - 450 10e9/L   Basic metabolic panel    Collection Time: 12/04/20  7:35 AM   Result Value Ref Range    Sodium 140 133 - 144 mmol/L    Potassium 4.4 3.4 - 5.3 mmol/L    Chloride 109 94 - 109 mmol/L    Carbon Dioxide 28 20 - 32 mmol/L    Anion Gap 3 3 - 14 mmol/L    Glucose 91 70 - 99 mg/dL    Urea Nitrogen 14 7 - 30 mg/dL    Creatinine 0.88 0.66 - 1.25 mg/dL    GFR Estimate >90 >60 mL/min/[1.73_m2]    GFR Estimate If Black >90 >60 mL/min/[1.73_m2]    Calcium 9.8 8.5 - 10.1 mg/dL      No EKG this visit, completed in the last 90 days.    Revised Cardiac Risk Index (RCRI):  The patient has the following serious cardiovascular risks for perioperative complications:   - Coronary Artery Disease (MI, positive stress test, angina, Qs on EKG) = 1 point     RCRI Interpretation: 1 point: Class II (low risk - 0.9% complication rate)           Assessment & Plan   The proposed surgical procedure is considered INTERMEDIATE risk.    Dustin was seen today for pre-op exam.    Diagnoses and all orders for  this visit:    Preop general physical exam  -     CBC with platelets  -     Basic metabolic panel    Primary osteoarthritis of left hip    Coronary artery disease involving native coronary artery of native heart without angina pectoris    WPW (Joshua-Parkinson-White syndrome)         Risks and Recommendations:  The patient has the following additional risks and recommendations for perioperative complications:   - No identified additional risk factors other than previously addressed    CARDIOVASCULAR:   - Cynthia has known CAD (NSTEMI in 2016). He had stent placement in the mid-LAD, ramus branch, and marginal branch of the circumflex artery. A nuclear medicine stress study was performed as part of pre-op clearance workup for a recent orthopedic surgery (right TKA) and the findings prompted follow up angiography, which revealed patent stents and no further need for angioplasty or stenting. He remained without symptoms and successfully underwent right TKA and left TKA earlier this year. He was cleared by cardiology prior to his left TKA in September. No issues or concerns since then from a cardiac standpoint.     Medication Instructions:   - aspirin: Discontinue aspirin 7-10 days prior to procedure to reduce bleeding risk. It should be resumed postoperatively.    - Beta Blockers: Continue taking on the day of surgery.    RECOMMENDATION:  APPROVAL GIVEN to proceed with proposed procedure, without further diagnostic evaluation.    Signed Electronically by: Wesly Liz NP    Copy of this evaluation report is provided to requesting physician.    [facility] Preop Guidelines    Revised Cardiac Risk Index

## 2020-12-04 NOTE — TELEPHONE ENCOUNTER
General Call:     Who is calling:  Dustin    Reason for Call:  Pt called in to give fax number to send pre op [phone] Babs Márquez    What are your questions or concerns:      Date of last appointment with provider: 12/4    Okay to leave a detailed message:Yes at Home number on file [phone] (home)

## 2020-12-04 NOTE — TELEPHONE ENCOUNTER
Pt called in another fax number [phone]- [facility]. Please send to both numbers    Heidy White  Patient

## 2020-12-13 ENCOUNTER — MEDICAL CORRESPONDENCE (OUTPATIENT)
Dept: HEALTH INFORMATION MANAGEMENT | Facility: CLINIC | Age: 50
End: 2020-12-13

## 2020-12-15 ENCOUNTER — TELEPHONE (OUTPATIENT)
Dept: FAMILY MEDICINE | Facility: CLINIC | Age: 50
End: 2020-12-15

## 2020-12-15 NOTE — TELEPHONE ENCOUNTER
Order/Referral Request:    Who is requesting:  [facility] PT from [facility]    Orders being requested: PT one addt visit this week and then twice a weeks for two weeks for strenghtning after hip replacement.    Reason service is needed/diagnosis: recovery from Hip replacement    When are orders needed by: asap    Has this been discussed with Provider: Yes    Does patient have a preference on a Group/Provider/Facility? [facility]    Does patient have an appointment scheduled: No    Where to send Orders:Verbal                               Okay to leave detailed message? Yes to Latonia at    [phone]  Routing

## 2020-12-15 NOTE — TELEPHONE ENCOUNTER
Routing to provider for review and advise as appropriate.  PCP-are you following patient for home care?    RACQUEL BautistaN, RN  [facility]

## 2020-12-15 NOTE — TELEPHONE ENCOUNTER
Left detailed message given verbal approval given for Home Care orders requested and advised to fax for signature.  Fax number given.    Orders being requested: PT one addt visit this week and then twice a weeks for two weeks for strenghtning after hip replacement.      RACQUEL BautistaN, RN  [facility]

## 2020-12-15 NOTE — TELEPHONE ENCOUNTER
Ok for this protocol. I will sign the paperwork when it is faxed. Please give verbal order for now. Thanks.    Wesly Liz NP on 12/15/2020 at 3:25 PM

## 2020-12-16 ENCOUNTER — MEDICAL CORRESPONDENCE (OUTPATIENT)
Dept: HEALTH INFORMATION MANAGEMENT | Facility: CLINIC | Age: 50
End: 2020-12-16

## 2020-12-18 ENCOUNTER — VIRTUAL VISIT (OUTPATIENT)
Dept: UROLOGY | Facility: CLINIC | Age: 50
End: 2020-12-18
Payer: COMMERCIAL

## 2020-12-18 VITALS — BODY MASS INDEX: 26.22 KG/M2 | HEIGHT: 68 IN | WEIGHT: 173 LBS

## 2020-12-18 DIAGNOSIS — N40.1 BENIGN PROSTATIC HYPERPLASIA WITH NOCTURIA: Primary | ICD-10-CM

## 2020-12-18 DIAGNOSIS — R35.1 BENIGN PROSTATIC HYPERPLASIA WITH NOCTURIA: Primary | ICD-10-CM

## 2020-12-18 PROCEDURE — 99203 OFFICE O/P NEW LOW 30 MIN: CPT | Mod: 95 | Performed by: PHYSICIAN ASSISTANT

## 2020-12-18 RX ORDER — OXYCODONE AND ACETAMINOPHEN 5; 325 MG/1; MG/1
1 TABLET ORAL EVERY 6 HOURS PRN
COMMUNITY
End: 2021-08-20

## 2020-12-18 RX ORDER — CELECOXIB 200 MG/1
200 CAPSULE ORAL 2 TIMES DAILY
COMMUNITY
End: 2021-08-20

## 2020-12-18 RX ORDER — TAMSULOSIN HYDROCHLORIDE 0.4 MG/1
0.4 CAPSULE ORAL DAILY
Qty: 90 CAPSULE | Refills: 4 | Status: SHIPPED | OUTPATIENT
Start: 2020-12-18 | End: 2021-08-20

## 2020-12-18 ASSESSMENT — MIFFLIN-ST. JEOR: SCORE: 1629.22

## 2020-12-18 ASSESSMENT — PAIN SCALES - GENERAL: PAINLEVEL: NO PAIN (0)

## 2020-12-18 NOTE — LETTER
"12/18/2020       RE: Dustin Avila  [id number] [place] [address]     Dear Colleague,    Thank you for referring your patient, Dustin Avila, to the [facility] [place] at [facility]. Please see a copy of my visit note below.    Dustin Avila is a 50 year old male who is being evaluated via a billable video visit.      The patient has been notified of following:     \"This video visit will be conducted via a call between you and your physician/provider. We have found that certain health care needs can be provided without the need for an in-person physical exam.  This service lets us provide the care you need with a video conversation.  If a prescription is necessary we can send it directly to your pharmacy.  If lab work is needed we can place an order for that and you can then stop by our lab to have the test done at a later time.    Video visits are billed at different rates depending on your insurance coverage.  Please reach out to your insurance provider with any questions.    If during the course of the call the physician/provider feels a video visit is not appropriate, you will not be charged for this service.\"    Patient has given verbal consent for Video visit? Yes  How would you like to obtain your AVS? MyChart  If you are dropped from the video visit, the video invite should be resent to: Text to cell phone: [phone]  Will anyone else be joining your video visit? No    Gayatri Glass CMA      Video-Visit Details    Type of service:  Video Visit    Video Start Time: 2:07 PM  Video End Time: 2:24 PM    Originating Location (pt. Location): Home    Distant Location (provider location):  [facility] [place]     Platform used for Video Visit: Doximity    CC: frequency    HPI:  Dustin \"Sarge\" Austin is a pleasant 50 year old male who presents via billable video visit in consultation from Wesly Liz CNP for evaluation " of the above.     Slower stream, post void dribbling, nocturia x4, 2nd void if he waits long enough. No dysuria or gross hematuria. Ongoing for a few months now.      2.26. No family hx of prostate cancer.    Past Medical History:   Diagnosis Date     Achilles tendonitis      Ankle edema      Arthritis      Coronary artery disease     cardiac cath 2019: no intervention; cath 2016:  + PIERRE to OM1, PIERRE to LAd, PIERRE to ramus     Hernia, abdominal      Hyperlipidemia      Incidental lung nodule      Osgood-Schlatter/osteochondroses     left     Parkinsons disease (H)      WPW (Joshua-Parkinson-White syndrome)        Past Surgical History:   Procedure Laterality Date     ARTHROSCOPY KNEE  2012    Procedure:ARTHROSCOPY KNEE; left knee arthroscopy, partial medial and lateral meniscectomy ; Surgeon:MARIA M THOMAS; Location: OR     CV HEART CATHETERIZATION WITH POSSIBLE INTERVENTION N/A 2019    Procedure: Heart Catheterization with Possible Intervention;  Surgeon: Heydi Bro MD;  Location:  HEART CARDIAC CATH LAB     HERNIA REPAIR       ORTHOPEDIC SURGERY      left leg achilles rupture     ORTHOPEDIC SURGERY      right hip replacemnent     PENIS SURGERY       SEPTOPLASTY       VASECTOMY         Social History     Socioeconomic History     Marital status:      Spouse name: seperated     Number of children: 2     Years of education: Not on file     Highest education level: Not on file   Occupational History     Occupation: self     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   Social Needs     Financial resource strain: Not on file     Food insecurity     Worry: Not on file     Inability: Not on file     Transportation needs     Medical: Not on file     Non-medical: Not on file   Tobacco Use     Smoking status: Current Every Day Smoker     Packs/day: 0.25     Years: 35.00     Pack years: 8.75     Types: Vaping Device     Start date:      Last attempt to quit: 2019     Years since quittin.1      Smokeless tobacco: Never Used   Substance and Sexual Activity     Alcohol use: Not Currently     Alcohol/week: 0.0 standard drinks     Comment: once weekly     Drug use: Yes     Types: Marijuana     Sexual activity: Yes     Partners: Female   Lifestyle     Physical activity     Days per week: Not on file     Minutes per session: Not on file     Stress: Not on file   Relationships     Social connections     Talks on phone: Not on file     Gets together: Not on file     Attends Roman Catholic service: Not on file     Active member of club or organization: Not on file     Attends meetings of clubs or organizations: Not on file     Relationship status: Not on file     Intimate partner violence     Fear of current or ex partner: Not on file     Emotionally abused: Not on file     Physically abused: Not on file     Forced sexual activity: Not on file   Other Topics Concern     Parent/sibling w/ CABG, MI or angioplasty before 65F 55M? No      Service Not Asked     Blood Transfusions Not Asked     Caffeine Concern No     Occupational Exposure Not Asked     Hobby Hazards Not Asked     Sleep Concern Yes     Stress Concern Yes     Weight Concern Yes     Comment: weight loss     Special Diet Not Asked     Back Care Not Asked     Exercise Yes     Comment: cardiac rehab 3 days week     Bike Helmet Not Asked     Seat Belt Not Asked     Self-Exams Not Asked   Social History Narrative     Not on file       Family History   Problem Relation Age of Onset     Diabetes Father        ROS:14 point ROS neg other than the symptoms noted above in the HPI.    No Known Allergies    Current Outpatient Medications   Medication     aspirin EC 81 MG EC tablet     atorvastatin (LIPITOR) 40 MG tablet     celecoxib (CELEBREX) 200 MG capsule     medical cannabis (Patient's own supply)     metoprolol succinate ER (TOPROL-XL) 25 MG 24 hr tablet     oxyCODONE-acetaminophen (PERCOCET) 5-325 MG tablet     cyclobenzaprine (FLEXERIL) 10 MG tablet      "nitroGLYcerin (NITROSTAT) 0.4 MG sublingual tablet     spacer (OPTICHAMBER KAY) holding chamber     No current facility-administered medications for this visit.          PEx:   Height 1.727 m (5' 8\"), weight 78.5 kg (173 lb).    PSYCH: NAD  EYES: EOMI  MOUTH: MMM  NECK: Supple, no notable adenopathy  RESP: Unlabored breathing  NEURO: AAO x3      A/P: Dustin Avila is a 50 year old male with BPH w/ LUTS vs polyuria  -Eliminate irritants, limit fluids after 5pm  -Trial of Flomax, SE reviewed.   -May want to avoid meds all together and we discussed this would require a cysto vs urine vol collection vs sleep study.     Ericka Mcdowell PA-C  [facility]      "

## 2020-12-18 NOTE — PROGRESS NOTES
"Dustin Avila is a 50 year old male who is being evaluated via a billable video visit.      The patient has been notified of following:     \"This video visit will be conducted via a call between you and your physician/provider. We have found that certain health care needs can be provided without the need for an in-person physical exam.  This service lets us provide the care you need with a video conversation.  If a prescription is necessary we can send it directly to your pharmacy.  If lab work is needed we can place an order for that and you can then stop by our lab to have the test done at a later time.    Video visits are billed at different rates depending on your insurance coverage.  Please reach out to your insurance provider with any questions.    If during the course of the call the physician/provider feels a video visit is not appropriate, you will not be charged for this service.\"    Patient has given verbal consent for Video visit? Yes  How would you like to obtain your AVS? MyChart  If you are dropped from the video visit, the video invite should be resent to: Text to cell phone: [phone]  Will anyone else be joining your video visit? No    Gayatri Glass CMA      Video-Visit Details    Type of service:  Video Visit    Video Start Time: 2:07 PM  Video End Time: 2:24 PM    Originating Location (pt. Location): Home    Distant Location (provider location):  [facility] [place]     Platform used for Video Visit: maufait    CC: frequency    HPI:  Dustin Avila (Sarge\) is a pleasant 50 year old male who presents via billable video visit in consultation from Wesly Liz CNP for evaluation of the above.     Slower stream, post void dribbling, nocturia x4, 2nd void if he waits long enough. No dysuria or gross hematuria. Ongoing for a few months now.      2.26. No family hx of prostate cancer.    Past Medical History:   Diagnosis Date     Achilles tendonitis      Ankle edema  "     Arthritis      Coronary artery disease     cardiac cath 2019: no intervention; cath 2016:  + PIERRE to OM1, PIERRE to LAd, PIERRE to ramus     Hernia, abdominal      Hyperlipidemia      Incidental lung nodule      Osgood-Schlatter/osteochondroses     left     Parkinsons disease (H)      WPW (Joshua-Parkinson-White syndrome)        Past Surgical History:   Procedure Laterality Date     ARTHROSCOPY KNEE  2012    Procedure:ARTHROSCOPY KNEE; left knee arthroscopy, partial medial and lateral meniscectomy ; Surgeon:MARIA M THOMAS; Location:[facility] OR     CV HEART CATHETERIZATION WITH POSSIBLE INTERVENTION N/A 2019    Procedure: Heart Catheterization with Possible Intervention;  Surgeon: Heydi Bro MD;  Location:  HEART CARDIAC CATH LAB     HERNIA REPAIR       ORTHOPEDIC SURGERY      left leg achilles rupture     ORTHOPEDIC SURGERY      right hip replacemnent     PENIS SURGERY       SEPTOPLASTY       VASECTOMY         Social History     Socioeconomic History     Marital status:      Spouse name: seperated     Number of children: 2     Years of education: Not on file     Highest education level: Not on file   Occupational History     Occupation: self     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   Social Needs     Financial resource strain: Not on file     Food insecurity     Worry: Not on file     Inability: Not on file     Transportation needs     Medical: Not on file     Non-medical: Not on file   Tobacco Use     Smoking status: Current Every Day Smoker     Packs/day: 0.25     Years: 35.00     Pack years: 8.75     Types: Vaping Device     Start date:      Last attempt to quit: 2019     Years since quittin.1     Smokeless tobacco: Never Used   Substance and Sexual Activity     Alcohol use: Not Currently     Alcohol/week: 0.0 standard drinks     Comment: once weekly     Drug use: Yes     Types: Marijuana     Sexual activity: Yes     Partners: Female   Lifestyle     Physical activity     Days per week: Not  "on file     Minutes per session: Not on file     Stress: Not on file   Relationships     Social connections     Talks on phone: Not on file     Gets together: Not on file     Attends Quaker service: Not on file     Active member of club or organization: Not on file     Attends meetings of clubs or organizations: Not on file     Relationship status: Not on file     Intimate partner violence     Fear of current or ex partner: Not on file     Emotionally abused: Not on file     Physically abused: Not on file     Forced sexual activity: Not on file   Other Topics Concern     Parent/sibling w/ CABG, MI or angioplasty before 65F 55M? No      Service Not Asked     Blood Transfusions Not Asked     Caffeine Concern No     Occupational Exposure Not Asked     Hobby Hazards Not Asked     Sleep Concern Yes     Stress Concern Yes     Weight Concern Yes     Comment: weight loss     Special Diet Not Asked     Back Care Not Asked     Exercise Yes     Comment: cardiac rehab 3 days week     Bike Helmet Not Asked     Seat Belt Not Asked     Self-Exams Not Asked   Social History Narrative     Not on file       Family History   Problem Relation Age of Onset     Diabetes Father        ROS:14 point ROS neg other than the symptoms noted above in the HPI.    No Known Allergies    Current Outpatient Medications   Medication     aspirin EC 81 MG EC tablet     atorvastatin (LIPITOR) 40 MG tablet     celecoxib (CELEBREX) 200 MG capsule     medical cannabis (Patient's own supply)     metoprolol succinate ER (TOPROL-XL) 25 MG 24 hr tablet     oxyCODONE-acetaminophen (PERCOCET) 5-325 MG tablet     cyclobenzaprine (FLEXERIL) 10 MG tablet     nitroGLYcerin (NITROSTAT) 0.4 MG sublingual tablet     spacer (OPTICHAMBER KAY) holding chamber     No current facility-administered medications for this visit.          PEx:   Height 1.727 m (5' 8\"), weight 78.5 kg (173 lb).    PSYCH: NAD  EYES: EOMI  MOUTH: MMM  NECK: Supple, no notable " adenopathy  RESP: Unlabored breathing  NEURO: AAO x3      A/P: Dustin Avila is a 50 year old male with BPH w/ LUTS vs polyuria  -Eliminate irritants, limit fluids after 5pm  -Trial of Flomax, SE reviewed.   -May want to avoid meds all together and we discussed this would require a cysto vs urine vol collection vs sleep study.     Ericka Mcdowell PA-C  [facility]

## 2020-12-18 NOTE — PATIENT INSTRUCTIONS
You have been prescribed Flomax (tamsulosin).    Flomax as prescribed for men with difficulty urinating due to a benign prostatic enlargement.  Hopefully, you will see improvement in urinary stream in the next 10-14 days.    Common side effects include:  Weakness or fatigue  Blurred vision  Sinus congestion or runny nose  Dizziness or lightheadedness  Decrease or lack of ejaculation      Below is a list of things that can irritate the bladder and should be eliminated:      Caffeinated soft drinks.    Coffee.    Tea.    Chocolate.    Tomato-based foods.    Acidic juices and fruits. (includes cranberry juice)    Alcohol.    Carbonated drinks.    Aspartame/Nutrasweet.

## 2020-12-21 ENCOUNTER — TELEPHONE (OUTPATIENT)
Dept: UROLOGY | Facility: CLINIC | Age: 50
End: 2020-12-21

## 2020-12-21 DIAGNOSIS — R35.1 BENIGN PROSTATIC HYPERPLASIA WITH NOCTURIA: Primary | ICD-10-CM

## 2020-12-21 DIAGNOSIS — N40.1 BENIGN PROSTATIC HYPERPLASIA WITH NOCTURIA: Primary | ICD-10-CM

## 2020-12-21 NOTE — TELEPHONE ENCOUNTER
----- Message from Veena Amor sent at 12/21/2020  3:24 PM CST -----  Can you put in UA order? Thanks!  ----- Message -----  From: Alize Doe  Sent: 12/21/2020   2:29 PM CST  To: [facility] - Scheduling Pool    0 Return in about 4 weeks (around 1/15/2021). UA/micro w/ his local lab, video in 4 weeks, med check  MAURICE

## 2020-12-28 ENCOUNTER — MEDICAL CORRESPONDENCE (OUTPATIENT)
Dept: HEALTH INFORMATION MANAGEMENT | Facility: CLINIC | Age: 50
End: 2020-12-28

## 2020-12-30 ENCOUNTER — MEDICAL CORRESPONDENCE (OUTPATIENT)
Dept: HEALTH INFORMATION MANAGEMENT | Facility: CLINIC | Age: 50
End: 2020-12-30

## 2020-12-31 ENCOUNTER — MEDICAL CORRESPONDENCE (OUTPATIENT)
Dept: HEALTH INFORMATION MANAGEMENT | Facility: CLINIC | Age: 50
End: 2020-12-31

## 2021-02-01 ENCOUNTER — VIRTUAL VISIT (OUTPATIENT)
Dept: UROLOGY | Facility: CLINIC | Age: 51
End: 2021-02-01
Payer: COMMERCIAL

## 2021-02-01 VITALS — HEIGHT: 68 IN | WEIGHT: 168 LBS | BODY MASS INDEX: 25.46 KG/M2

## 2021-02-01 DIAGNOSIS — R35.0 URINARY FREQUENCY: Primary | ICD-10-CM

## 2021-02-01 PROCEDURE — 99212 OFFICE O/P EST SF 10 MIN: CPT | Mod: 95 | Performed by: PHYSICIAN ASSISTANT

## 2021-02-01 ASSESSMENT — PAIN SCALES - GENERAL: PAINLEVEL: NO PAIN (0)

## 2021-02-01 ASSESSMENT — MIFFLIN-ST. JEOR: SCORE: 1606.54

## 2021-02-01 NOTE — LETTER
"2/1/2021       RE: Dustin Avila  [address] [place] [address]     Dear Colleague,    Thank you for referring your patient, Dustin Avila, to the [facility] [place] at [facility]. Please see a copy of my visit note below.    Pt did not do his urine stuff... but wants to talk.  ==============================    Cynthia is a 50 year old who is being evaluated via a billable video visit.      How would you like to obtain your AVS? MyChart  If the video visit is dropped, the invitation should be resent by: Text to cell phone: [phone]  Will anyone else be joining your video visit? No      Video Start Time: 1:28 PM  Video-Visit Details    Type of service:  Video Visit    Video End Time:1:41 PM    Originating Location (pt. Location): Home    Distant Location (provider location):  [facility] [place]     Platform used for Video Visit: Shark Punch     CC: frequency     HPI:  Dustin \"Cynthia\" Austin is a pleasant 50 year old male who presents via billable video visit in follow up of the above.      Slower stream, post void dribbling, nocturia x4, 2nd void if he waits long enough. No dysuria or gross hematuria. Ongoing for a few months now. Prescribed Flomax in Dec, but was worried about drowsiness while he was on narcs for joint pain (no s/p bilateral knee replacement and a hip replacement). Off narcs. Revisiting urinary issues now that he has recovered,      PSA 2020 2.26. No family hx of prostate cancer.         Past Medical History        Past Medical History:   Diagnosis Date     Achilles tendonitis       Ankle edema       Arthritis       Coronary artery disease       cardiac cath 11/2019: no intervention; cath 2016:  + PIERRE to OM1, PIERRE to LAd, PIERRE to ramus     Hernia, abdominal       Hyperlipidemia       Incidental lung nodule       Osgood-Schlatter/osteochondroses       left     Parkinsons disease (H)       WPW (Joshua-Parkinson-White " syndrome)              Past Surgical History         Past Surgical History:   Procedure Laterality Date     ARTHROSCOPY KNEE   2012     Procedure:ARTHROSCOPY KNEE; left knee arthroscopy, partial medial and lateral meniscectomy ; Surgeon:MARIA M THOMAS; Location:[facility] OR     CV HEART CATHETERIZATION WITH POSSIBLE INTERVENTION N/A 2019     Procedure: Heart Catheterization with Possible Intervention;  Surgeon: Heydi Bro MD;  Location:  HEART CARDIAC CATH LAB     HERNIA REPAIR         ORTHOPEDIC SURGERY         left leg achilles rupture     ORTHOPEDIC SURGERY         right hip replacemnent     PENIS SURGERY         SEPTOPLASTY         VASECTOMY                Social History   Social History            Socioeconomic History     Marital status:        Spouse name: seperated     Number of children: 2     Years of education: Not on file     Highest education level: Not on file   Occupational History     Occupation: self     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   Social Needs     Financial resource strain: Not on file     Food insecurity       Worry: Not on file       Inability: Not on file     Transportation needs       Medical: Not on file       Non-medical: Not on file   Tobacco Use     Smoking status: Current Every Day Smoker       Packs/day: 0.25       Years: 35.00       Pack years: 8.75       Types: Vaping Device       Start date:        Last attempt to quit: 2019       Years since quittin.1     Smokeless tobacco: Never Used   Substance and Sexual Activity     Alcohol use: Not Currently       Alcohol/week: 0.0 standard drinks       Comment: once weekly     Drug use: Yes       Types: Marijuana     Sexual activity: Yes       Partners: Female   Lifestyle     Physical activity       Days per week: Not on file       Minutes per session: Not on file     Stress: Not on file   Relationships     Social connections       Talks on phone: Not on file       Gets together: Not on file       Attends Latter day  service: Not on file       Active member of club or organization: Not on file       Attends meetings of clubs or organizations: Not on file       Relationship status: Not on file     Intimate partner violence       Fear of current or ex partner: Not on file       Emotionally abused: Not on file       Physically abused: Not on file       Forced sexual activity: Not on file   Other Topics Concern     Parent/sibling w/ CABG, MI or angioplasty before 65F 55M? No      Service Not Asked     Blood Transfusions Not Asked     Caffeine Concern No     Occupational Exposure Not Asked     Hobby Hazards Not Asked     Sleep Concern Yes     Stress Concern Yes     Weight Concern Yes       Comment: weight loss     Special Diet Not Asked     Back Care Not Asked     Exercise Yes       Comment: cardiac rehab 3 days week     Bike Helmet Not Asked     Seat Belt Not Asked     Self-Exams Not Asked   Social History Narrative     Not on file            Family History         Family History   Problem Relation Age of Onset     Diabetes Father              ROS:14 point ROS neg other than the symptoms noted above in the HPI.     No Known Allergies         Current Outpatient Medications   Medication     aspirin EC 81 MG EC tablet     atorvastatin (LIPITOR) 40 MG tablet     celecoxib (CELEBREX) 200 MG capsule     medical cannabis (Patient's own supply)     metoprolol succinate ER (TOPROL-XL) 25 MG 24 hr tablet     oxyCODONE-acetaminophen (PERCOCET) 5-325 MG tablet     cyclobenzaprine (FLEXERIL) 10 MG tablet     nitroGLYcerin (NITROSTAT) 0.4 MG sublingual tablet     spacer (OPTICHAMBER KAY) holding chamber      No current facility-administered medications for this visit.             PEx:   PSYCH: NAD, pleasant  EYES: EOMI  MOUTH: MMM  NECK: Supple, no notable adenopathy  RESP: Unlabored breathing  NEURO: AAO x3        A/P: Dustin Avila is a 50 year old male with BPH w/ LUTS vs polyuria  -Eliminate irritants, limit fluids after  5pm  -Hoping to avoid meds all together and we discussed this would require a cysto to start.      Ericka Mcdowell PA-C  [facility]

## 2021-02-01 NOTE — PROGRESS NOTES
"Pt did not do his urine stuff... but wants to talk.  ==============================    Cynthia is a 50 year old who is being evaluated via a billable video visit.      How would you like to obtain your AVS? MyChart  If the video visit is dropped, the invitation should be resent by: Text to cell phone: [phone]  Will anyone else be joining your video visit? No      Video Start Time: 1:28 PM  Video-Visit Details    Type of service:  Video Visit    Video End Time:1:41 PM    Originating Location (pt. Location): Home    Distant Location (provider location):  [facility]     Platform used for Video Visit: Periscape     CC: frequency     HPI:  Dustin \"Cynthia\" Austin is a pleasant 50 year old male who presents via billable video visit in follow up of the above.      Slower stream, post void dribbling, nocturia x4, 2nd void if he waits long enough. No dysuria or gross hematuria. Ongoing for a few months now. Prescribed Flomax in Dec, but was worried about drowsiness while he was on narcs for joint pain (no s/p bilateral knee replacement and a hip replacement). Off narcs. Revisiting urinary issues now that he has recovered,      PSA 2020 2.26. No family hx of prostate cancer.         Past Medical History        Past Medical History:   Diagnosis Date     Achilles tendonitis       Ankle edema       Arthritis       Coronary artery disease       cardiac cath 11/2019: no intervention; cath 2016:  + PIERRE to OM1, PIERRE to LAd, PIERRE to ramus     Hernia, abdominal       Hyperlipidemia       Incidental lung nodule       Osgood-Schlatter/osteochondroses       left     Parkinsons disease (H)       WPW (Joshua-Parkinson-White syndrome)              Past Surgical History         Past Surgical History:   Procedure Laterality Date     ARTHROSCOPY KNEE   5/8/2012     Procedure:ARTHROSCOPY KNEE; left knee arthroscopy, partial medial and lateral meniscectomy ; Surgeon:MARIA M THOMAS; Location: OR     CV HEART " CATHETERIZATION WITH POSSIBLE INTERVENTION N/A 2019     Procedure: Heart Catheterization with Possible Intervention;  Surgeon: Heydi Bro MD;  Location:  HEART CARDIAC CATH LAB     HERNIA REPAIR         ORTHOPEDIC SURGERY         left leg achilles rupture     ORTHOPEDIC SURGERY         right hip replacemnent     PENIS SURGERY         SEPTOPLASTY         VASECTOMY                Social History   Social History      Socioeconomic History     Marital status:        Spouse name: seperated     Number of children: 2     Years of education: Not on file     Highest education level: Not on file   Occupational History     Occupation: self     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   Social Needs     Financial resource strain: Not on file     Food insecurity       Worry: Not on file       Inability: Not on file     Transportation needs       Medical: Not on file       Non-medical: Not on file   Tobacco Use     Smoking status: Current Every Day Smoker       Packs/day: 0.25       Years: 35.00       Pack years: 8.75       Types: Vaping Device       Start date:        Last attempt to quit: 2019       Years since quittin.1     Smokeless tobacco: Never Used   Substance and Sexual Activity     Alcohol use: Not Currently       Alcohol/week: 0.0 standard drinks       Comment: once weekly     Drug use: Yes       Types: Marijuana     Sexual activity: Yes       Partners: Female   Lifestyle     Physical activity       Days per week: Not on file       Minutes per session: Not on file     Stress: Not on file   Relationships     Social connections       Talks on phone: Not on file       Gets together: Not on file       Attends Alevism service: Not on file       Active member of club or organization: Not on file       Attends meetings of clubs or organizations: Not on file       Relationship status: Not on file     Intimate partner violence       Fear of current or ex partner: Not on file       Emotionally abused: Not on  file       Physically abused: Not on file       Forced sexual activity: Not on file   Other Topics Concern     Parent/sibling w/ CABG, MI or angioplasty before 65F 55M? No      Service Not Asked     Blood Transfusions Not Asked     Caffeine Concern No     Occupational Exposure Not Asked     Hobby Hazards Not Asked     Sleep Concern Yes     Stress Concern Yes     Weight Concern Yes       Comment: weight loss     Special Diet Not Asked     Back Care Not Asked     Exercise Yes       Comment: cardiac rehab 3 days week     Bike Helmet Not Asked     Seat Belt Not Asked     Self-Exams Not Asked   Social History Narrative     Not on file            Family History         Family History   Problem Relation Age of Onset     Diabetes Father              ROS:14 point ROS neg other than the symptoms noted above in the HPI.     No Known Allergies         Current Outpatient Medications   Medication     aspirin EC 81 MG EC tablet     atorvastatin (LIPITOR) 40 MG tablet     celecoxib (CELEBREX) 200 MG capsule     medical cannabis (Patient's own supply)     metoprolol succinate ER (TOPROL-XL) 25 MG 24 hr tablet     oxyCODONE-acetaminophen (PERCOCET) 5-325 MG tablet     cyclobenzaprine (FLEXERIL) 10 MG tablet     nitroGLYcerin (NITROSTAT) 0.4 MG sublingual tablet     spacer (OPTICHAMBER KAY) holding chamber      No current facility-administered medications for this visit.             PEx:   PSYCH: NAD, pleasant  EYES: EOMI  MOUTH: MMM  NECK: Supple, no notable adenopathy  RESP: Unlabored breathing  NEURO: AAO x3        A/P: Dustin Avila is a 50 year old male with BPH w/ LUTS vs polyuria  -Eliminate irritants, limit fluids after 5pm  -Hoping to avoid meds all together and we discussed this would require a cysto to start.      Ericka Mcdowell PA-C  [facility]

## 2021-02-09 ENCOUNTER — TELEPHONE (OUTPATIENT)
Dept: UROLOGY | Facility: CLINIC | Age: 51
End: 2021-02-09

## 2021-02-09 NOTE — TELEPHONE ENCOUNTER
----- Message from Alize Doe sent at 2/1/2021  4:14 PM CST -----  Cysto, eval for bladder outlet obstruction, nocturia q 2 hours, dribbling    MAURICE

## 2021-04-13 DIAGNOSIS — I25.10 CORONARY ARTERY DISEASE INVOLVING NATIVE CORONARY ARTERY OF NATIVE HEART WITHOUT ANGINA PECTORIS: Primary | Chronic | ICD-10-CM

## 2021-04-13 DIAGNOSIS — E78.5 HYPERLIPIDEMIA LDL GOAL <70: ICD-10-CM

## 2021-06-15 ENCOUNTER — TRANSFERRED RECORDS (OUTPATIENT)
Dept: HEALTH INFORMATION MANAGEMENT | Facility: CLINIC | Age: 51
End: 2021-06-15

## 2021-07-19 ENCOUNTER — TRANSFERRED RECORDS (OUTPATIENT)
Dept: HEALTH INFORMATION MANAGEMENT | Facility: CLINIC | Age: 51
End: 2021-07-19

## 2021-08-20 ENCOUNTER — OFFICE VISIT (OUTPATIENT)
Dept: FAMILY MEDICINE | Facility: CLINIC | Age: 51
End: 2021-08-20
Payer: COMMERCIAL

## 2021-08-20 VITALS
DIASTOLIC BLOOD PRESSURE: 66 MMHG | HEART RATE: 86 BPM | SYSTOLIC BLOOD PRESSURE: 97 MMHG | OXYGEN SATURATION: 97 % | TEMPERATURE: 98.4 F | BODY MASS INDEX: 26.67 KG/M2 | WEIGHT: 176 LBS | HEIGHT: 68 IN

## 2021-08-20 DIAGNOSIS — Z01.818 PREOP GENERAL PHYSICAL EXAM: Primary | ICD-10-CM

## 2021-08-20 DIAGNOSIS — I25.2 OLD MYOCARDIAL INFARCTION: ICD-10-CM

## 2021-08-20 DIAGNOSIS — K40.90 LEFT INGUINAL HERNIA: ICD-10-CM

## 2021-08-20 LAB
CHOLEST SERPL-MCNC: 224 MG/DL
FASTING STATUS PATIENT QL REPORTED: YES
HDLC SERPL-MCNC: 45 MG/DL
LDLC SERPL CALC-MCNC: 160 MG/DL
NONHDLC SERPL-MCNC: 179 MG/DL
SARS-COV-2 RNA RESP QL NAA+PROBE: NEGATIVE
TRIGL SERPL-MCNC: 95 MG/DL

## 2021-08-20 PROCEDURE — U0005 INFEC AGEN DETEC AMPLI PROBE: HCPCS | Performed by: NURSE PRACTITIONER

## 2021-08-20 PROCEDURE — 99214 OFFICE O/P EST MOD 30 MIN: CPT | Performed by: NURSE PRACTITIONER

## 2021-08-20 PROCEDURE — U0003 INFECTIOUS AGENT DETECTION BY NUCLEIC ACID (DNA OR RNA); SEVERE ACUTE RESPIRATORY SYNDROME CORONAVIRUS 2 (SARS-COV-2) (CORONAVIRUS DISEASE [COVID-19]), AMPLIFIED PROBE TECHNIQUE, MAKING USE OF HIGH THROUGHPUT TECHNOLOGIES AS DESCRIBED BY CMS-2020-01-R: HCPCS | Performed by: NURSE PRACTITIONER

## 2021-08-20 PROCEDURE — 93000 ELECTROCARDIOGRAM COMPLETE: CPT | Performed by: NURSE PRACTITIONER

## 2021-08-20 PROCEDURE — 36415 COLL VENOUS BLD VENIPUNCTURE: CPT | Performed by: NURSE PRACTITIONER

## 2021-08-20 PROCEDURE — 80061 LIPID PANEL: CPT | Performed by: NURSE PRACTITIONER

## 2021-08-20 ASSESSMENT — MIFFLIN-ST. JEOR: SCORE: 1642.83

## 2021-08-20 NOTE — PATIENT INSTRUCTIONS
Preparing for Your Surgery  Getting started  A nurse will call you to review your health history and instructions. They will give you an arrival time based on your scheduled surgery time.  Please be ready to share the following:    Your doctor's clinic name and phone number    Your medical, surgical and anesthesia history    A list of allergies and sensitivities    A list of medicines, including herbal treatments and over-the-counter drugs    Whether the patient has a legal guardian (ask how to send us the papers in advance)  If you have a child who's having surgery, please ask for a copy of Preparing for Your Child's Surgery.    Preparing for surgery    Within 30 days of surgery: Have a pre-op exam (sometimes called an H&P, or History and Physical). This can be done at a clinic or pre-operative center.  ? If you're having a , you may not need this exam. Talk to your care team    At your pre-op exam, talk to your care team about all medicines you take. If you need to stop any medicines before surgery, ask when to start taking them again.  ? We do this for your safety. Many medicines can make you bleed too much during surgery. Some change how well surgery (anesthesia) drugs work.    Call your insurance company to let them know you're having surgery. (If you don't have insurance, call [phone].)    Call your clinic if there's any change in your health. This includes signs of a cold or flu (sore throat, runny nose, cough, rash, fever). It also includes a scrape or scratch near the surgery site.    If you have questions on the day of surgery, call your hospital or surgery center.  Eating and drinking guidelines  For your safety: Unless your surgeon tells you otherwise, follow the guidelines below.    Eat and drink as usual until 8 hours before surgery. After that, no food or milk.    Drink clear liquids until 2 hours before surgery. These are liquids you can see through, like water, Gatorade and Propel  Water. You may also have black coffee and tea (no cream or milk).    Nothing by mouth within 2 hours of surgery. This includes gum, candy and breath mints.    If you drink, stop drinking alcohol the night before surgery.    If your care team tells you to take medicine on the morning of surgery, it's okay to take it with a sip of water.  Preventing infection    Shower or bathe the night before and morning of your surgery. Follow the instructions your clinic gave you. (If no instructions, use regular soap.)    Don't shave or clip hair near your surgery site. We'll remove the hair if needed.    Don't smoke or vape the morning of surgery. You may chew nicotine gum up to 2 hours before surgery. A nicotine patch is okay.  ? Note: Some surgeries require you to completely quit smoking and nicotine. Check with your surgeon.    Your care team will make every effort to keep you safe from infection. We will:  ? Clean our hands often with soap and water (or an alcohol-based hand rub).  ? Clean the skin at your surgery site with a special soap that kills germs.  ? Give you a special gown to keep you warm. (Cold raises the risk of infection.)  ? Wear special hair covers, masks, gowns and gloves during surgery.  ? Give antibiotic medicine, if prescribed. Not all surgeries need antibiotics.  What to bring on the day of surgery    Photo ID and insurance card    Copy of your health care directive, if you have one    Glasses and hearing aides (bring cases)  ? You can't wear contacts during surgery    Inhaler and eye drops, if you use them (tell us about these when you arrive)    CPAP machine or breathing device, if you use them    A few personal items, if spending the night    If you have . . .  ? A pacemaker or ICD (cardiac defibrillator): Bring the ID card.  ? An implanted stimulator: Bring the remote control.  ? A legal guardian: Bring a copy of the certified (court-stamped) guardianship papers.  Please remove any jewelry, including  body piercings. Leave jewelry and other valuables at home.  If you're going home the day of surgery  Important: If you don't follow the rules below, we must cancel your surgery.     Arrange for someone to drive you home after surgery. You may not drive, take a taxi or take public transportation by yourself (unless you'll have local anesthesia only).    Arrange for a responsible adult to stay with you overnight. If you don't, we may keep you in the hospital overnight, and you may need to pay the costs yourself.  Questions?   If you have any questions for your care team, list them here: _________________________________________________________________________________________________________________________________________________________________________________________________________________________________________________________________________________________________________________________  For informational purposes only. Not to replace the advice of your health care provider. Copyright   2003, 2019 [employer]. All rights reserved. Clinically reviewed by Tanya Moreno MD. SMARTworks 898948 - REV 4/20.    Before Your Procedure or Hospital Admission  Testing for COVID-19 (Coronavirus)  Thank you for choosing [facility] for your health care needs. This is a very challenging time for everyone. The World Health Organization and the [facility] have declared the COVID-19 virus a pandemic.   Our goal is to keep you and our team here at [facility] safe and healthy. We've taken several steps to make this happen. For example:    We screen our staff, care teams and patients for COVID-19.    Everyone at [facility] must wear a mask and stay 6 feet apart.    We are limiting hospital and clinic visitors.  Before you come in  All patients must get tested for COVID-19. Your test needs to happen 2 to 4 days before you check in to the hospital or surgery site.   A clinic scheduler will call  "about a week in advance to set up a testing time at one of our labs where we'll take a swab of your nose or throat.  Note: If you go to a clinic or pharmacy like [facility] or [facility] for your test, make sure it's a \"RT-PCR\" test, not a \"rapid\" COVID-19 test. (See Questions and Answers below.)  After the test, please stay at home and stay out of contact with other people. It will be harder for you to recover if you get COVID-19 before your treatment.  Please follow all current safety guidelines, including:    Limit trips outside your home.    Limit the number of people you see.    Always wear a mask outside your home.    Use social distancing. (Stay 6 feet away from others whenever you can.)    Wash your hands often.  If your test shows you have COVID-19  If your test is positive, we'll let you know. A positive test means that you have the virus.   We'll probably have to postpone your admission, surgery or procedure. Your doctor will discuss this with you. After that, we'll let you know what to do and when you can reschedule.   We may need to cancel your treatment on short notice for other reasons, too.  If your test shows you DON'T have COVID-19  Even if your test is negative, you may still get COVID-19. It's rare but, sometimes, the test result is wrong. You could also catch the virus after taking the test.   There's a very small chance that you could catch COVID-19 in the hospital or surgery center. [facility] has taken many steps to prevent this from happening.   Day of your surgery or procedure    Please come wearing a mask or something else that covers both your nose and mouth.    When you arrive, we'll ask you some questions to find out if you have any signs or symptoms of COVID-19.    Ask your care team if you can have visitors. All visitors must wear masks and will be screened for signs of COVID-19.  ? Even if no visitors are allowed, you can still have with you:    Your legal guardian or legal decision " "maker    A parent and one other visitor, if you are younger than 18 years old    A partner and a , if you are in labor  ? We might need to teach you about taking care of yourself after surgery. If so, a visitor can come into the hospital to learn about it, too.  ? The rules for visitors change often, depending on how much the virus is spreading. To learn more, see Visiting a Loved One in the Hospital during the COVID-19 Outbreak.  Please call your care team, hospital or surgery center if you have any questions. We thank you for your understanding and for choosing [facility] for your care.   Questions and Answers  Does it matter where I get tested for COVID-19?  Yes. We urge you to get tested at one of our [facility] COVID-19 testing sites. We process these tests in our lab and can get the results quickly. Your [facility] care team needs to get your results before you check in.  What should I do if I can't get tested at [facility]?  You can get tested somewhere else, but you'll need to take these extra steps:  1. Contact your family doctor or clinic to arrange your test.  2. Take the test within 4 days of your surgery or procedure. We can't accept tests older than 4 days.  3. Make sure your doctor or clinic faxes your results to [facility] at [phone].  If we don't get your results in time, we may have to postpone or cancel your treatment.  Ask if you're getting a \"RT-PCR\" COVID-19 test. It should NOT be a \"rapid\" COVID-19 test. Many drug stores use \"rapid\" tests, but they may not be as accurate. We don't accept the results of \"rapid\" tests.  For informational purposes only. Not to replace the advice of your health care provider. Copyright   2020 [facility]. All rights reserved. Clinically reviewed by Infection [facility] and the [facility] COVID-19 Clinical Team. [employer] [id number] - REV 10/20.      STOP vitamins and aspirin now.     Still take your " metoprolol as normal

## 2021-08-20 NOTE — PROGRESS NOTES
[address], [address]  [facility] [address]  Phone: [phone]  Primary Provider: Wesly Liz  Pre-op Performing Provider: RICHARDSON BURCIAGA      PREOPERATIVE EVALUATION:  Today's date: 8/20/2021    Dustin Avila is a 50 year old male who presents for a preoperative evaluation.    Surgical Information:  Surgery/Procedure: Robotic assist laparoscopic repair of left inguinal hernia with mesh, possible right  Surgery Location: [facility]  Surgeon: Pieter  Surgery Date: 8/23/21  Time of Surgery: 10:30  Where patient plans to recover: At home with family  Fax number for surgical facility: [phone]    Type of Anesthesia Anticipated: General    Assessment & Plan     The proposed surgical procedure is considered INTERMEDIATE risk.    Problem List Items Addressed This Visit     None      Visit Diagnoses     Preop general physical exam    -  Primary    Relevant Orders    Lipid Profile    Asymptomatic COVID-19 Virus (Coronavirus) by PCR    EKG 12-lead complete w/read - Clinics (Completed)    Left inguinal hernia                   Risks and Recommendations:  The patient has the following additional risks and recommendations for perioperative complications:  Cardiovascular:   - pt is very active. No recent chest symptoms     Medication Instructions:  Patient is to take all scheduled medications on the day of surgery EXCEPT for modifications listed below:  hold vitamins and asa starting now. only take metoprolol as usual    RECOMMENDATION:  APPROVAL GIVEN to proceed with proposed procedure, without further diagnostic evaluation.        Subjective     HPI related to upcoming procedure: going for left inguinal hernia repair possible R and umbilical   Has been feeling well lately   Thinks he had covid last year. Has not had the covid vaccine   Has lost 40 lbs in the last year and is very active   Stopped lipitor 6 months ago   Is taking a lot of vitamins,  about 7 of them. Ositoca: cardio-omega, MTV          Preop Questions 8/20/2021   1. Have you ever had a heart attack or stroke? YES - DESx3 2016    2. Have you ever had surgery on your heart or blood vessels, such as a stent placement, a coronary artery bypass, or surgery on an artery in your head, neck, heart, or legs? YES -    3. Do you have chest pain with activity? No   4. Do you have a history of  heart failure? No   5. Do you currently have a cold, bronchitis or symptoms of other infection? No   6. Do you have a cough, shortness of breath, or wheezing? No   7. Do you or anyone in your family have previous history of blood clots? No   8. Do you or does anyone in your family have a serious bleeding problem such as prolonged bleeding following surgeries or cuts? No   9. Have you ever had problems with anemia or been told to take iron pills? No   10. Have you had any abnormal blood loss such as black, tarry or bloody stools? No   11. Have you ever had a blood transfusion? No   12. Are you willing to have a blood transfusion if it is medically needed before, during, or after your surgery? Yes   13. Have you or any of your relatives ever had problems with anesthesia? No   14. Do you have sleep apnea, excessive snoring or daytime drowsiness? No   15. Do you have any artifical heart valves or other implanted medical devices like a pacemaker, defibrillator, or continuous glucose monitor? No   16. Do you have artificial joints? YES - bilat knees and hips   17. Are you allergic to latex? No       Health Care Directive:  Patient does not have a Health Care Directive or Living Will: Discussed advance care planning with patient; however, patient declined at this time.    Preoperative Review of :   reviewed - history of opioids      Status of Chronic Conditions:  See problem list for active medical problems.  Problems all longstanding and stable, except as noted/documented.  See ROS for pertinent symptoms related to  these conditions.      Review of Systems  Constitutional, neuro, ENT, endocrine, pulmonary, cardiac, gastrointestinal, genitourinary, musculoskeletal, integument and psychiatric systems are negative, except as otherwise noted.    Patient Active Problem List    Diagnosis Date Noted     Old myocardial infarction 04/05/2017     Priority: Medium     Anxiety 03/13/2017     Priority: Medium     JOHN (generalized anxiety disorder) 11/15/2016     Priority: Medium     WPW (Joshua-Parkinson-White syndrome)      Priority: Medium     Coronary artery disease involving native coronary artery of native heart without angina pectoris      Priority: Medium     cardiac cath 11/2019: no intervention; cath 2016:  + PIERRE to OM1, PIERRE to LAD, PIERRE to ramus       Hyperlipidemia      Priority: Medium     Hyperlipidemia LDL goal <70 06/17/2016     Priority: Medium     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     Priority: Medium     As per history        Past Medical History:   Diagnosis Date     Achilles tendonitis      Ankle edema      Arthritis      Coronary artery disease     cardiac cath 11/2019: no intervention; cath 2016:  + PIERRE to OM1, PIERRE to LAd, PIERRE to ramus     Hernia, abdominal      Hyperlipidemia      Incidental lung nodule      Osgood-Schlatter/osteochondroses     left     Parkinsons disease (H)      WPW (Joshua-Parkinson-White syndrome)      Past Surgical History:   Procedure Laterality Date     ARTHROSCOPY KNEE  5/8/2012    Procedure:ARTHROSCOPY KNEE; left knee arthroscopy, partial medial and lateral meniscectomy ; Surgeon:MARIA M THOMAS; Location: OR     CV HEART CATHETERIZATION WITH POSSIBLE INTERVENTION N/A 11/19/2019    Procedure: Heart Catheterization with Possible Intervention;  Surgeon: Heydi Bro MD;  Location:  HEART CARDIAC CATH LAB     HERNIA REPAIR       ORTHOPEDIC SURGERY      left leg achilles rupture     ORTHOPEDIC SURGERY      right hip replacemnent     PENIS SURGERY       SEPTOPLASTY   "     VASECTOMY       Current Outpatient Medications   Medication Sig Dispense Refill     aspirin EC 81 MG EC tablet Take 1 tablet (81 mg) by mouth daily 90 tablet 3     medical cannabis (Patient's own supply) See Admin Instructions (The purpose of this order is to document that the patient reports taking medical cannabis.  This is not a prescription, and is not used to certify that the patient has a qualifying medical condition.)       metoprolol succinate ER (TOPROL-XL) 25 MG 24 hr tablet Take 1 tablet (25 mg) by mouth daily 90 tablet 3     nitroGLYcerin (NITROSTAT) 0.4 MG sublingual tablet PLACE 1 TAB TO TONGUE EVERY 5 MINS AS NEEDED-CHEST PAIN. CALL 911 AFTER 3 DOSES IF SYMPTOMS PERSIST 25 tablet 3   Multiple vitamins       No Known Allergies     Social History     Tobacco Use     Smoking status: Current Every Day Smoker     Packs/day: 0.25     Years: 35.00     Pack years: 8.75     Types: Vaping Device     Start date:      Last attempt to quit: 2019     Years since quittin.8     Smokeless tobacco: Never Used   Substance Use Topics     Alcohol use: Not Currently     Alcohol/week: 0.0 standard drinks     Comment: once weekly     Family History   Problem Relation Age of Onset     Diabetes Father      History   Drug Use     Types: Marijuana         Objective     BP 97/66 (BP Location: Left arm, Patient Position: Chair, Cuff Size: Adult Regular)   Pulse 86   Temp 98.4  F (36.9  C) (Temporal)   Ht 1.727 m (5' 8\")   Wt 79.8 kg (176 lb)   SpO2 97%   BMI 26.76 kg/m      Physical Exam    GENERAL APPEARANCE: healthy, alert and no distress     EYES: EOMI     RESP: lungs clear to auscultation - no rales, rhonchi or wheezes     CV: regular rates and rhythm, normal S1 S2, no S3 or S4 and no murmur, click or rub     MS: extremities normal- no gross deformities noted, no evidence of inflammation in joints, FROM in all extremities.     SKIN: no suspicious lesions or rashes     NEURO: Normal strength and tone, " sensory exam grossly normal, mentation intact and speech normal     PSYCH: mentation appears normal. and affect normal/bright    Recent Labs   Lab Test 12/04/20  0735 09/18/20  1218 03/09/20  1652 02/27/20  0819   HGB 14.1 15.1  --  14.9    316  --  289   INR  --   --  0.96 0.91    137  --  139   POTASSIUM 4.4 4.3  --  4.3   CR 0.88 0.89  --  0.72        Diagnostics:  No labs were ordered during this visit.   EKG: Normal Sinus Rhythm, normal axis, normal intervals, no acute ST/T changes c/w ischemia, no LVH by voltage criteria, unchanged morphology from previous tracings other than J-point elevation of V1, V2    Revised Cardiac Risk Index (RCRI):  The patient has the following serious cardiovascular risks for perioperative complications:   - Coronary Artery Disease (MI, positive stress test, angina, Qs on EKG) = 1 point     RCRI Interpretation: 1 point: Class II (low risk - 0.9% complication rate)           Signed Electronically by: ANTELMO Bai CNP  Copy of this evaluation report is provided to requesting physician.

## 2021-08-21 NOTE — RESULT ENCOUNTER NOTE
Perez Marie, your covid is negative  Your cholesterol is a little high. We want to see your LDL lower than 100 and you are at 160. I would consider going back on a statin and talking to your primary about that.   Dante

## 2021-08-24 DIAGNOSIS — J20.9 ACUTE BRONCHITIS WITH SYMPTOMS > 10 DAYS: ICD-10-CM

## 2021-08-25 RX ORDER — ALBUTEROL SULFATE 90 UG/1
AEROSOL, METERED RESPIRATORY (INHALATION)
Refills: 1 | OUTPATIENT
Start: 2021-08-25

## 2021-09-05 ENCOUNTER — HEALTH MAINTENANCE LETTER (OUTPATIENT)
Age: 51
End: 2021-09-05

## 2021-11-24 DIAGNOSIS — I25.10 CORONARY ARTERY DISEASE INVOLVING NATIVE CORONARY ARTERY OF NATIVE HEART WITHOUT ANGINA PECTORIS: ICD-10-CM

## 2021-11-24 RX ORDER — METOPROLOL SUCCINATE 25 MG/1
25 TABLET, EXTENDED RELEASE ORAL DAILY
Qty: 90 TABLET | Refills: 0 | Status: SHIPPED | OUTPATIENT
Start: 2021-11-24 | End: 2021-12-03

## 2021-12-03 DIAGNOSIS — I25.10 CORONARY ARTERY DISEASE INVOLVING NATIVE CORONARY ARTERY OF NATIVE HEART WITHOUT ANGINA PECTORIS: ICD-10-CM

## 2021-12-03 RX ORDER — METOPROLOL SUCCINATE 25 MG/1
25 TABLET, EXTENDED RELEASE ORAL DAILY
Qty: 90 TABLET | Refills: 0 | Status: SHIPPED | OUTPATIENT
Start: 2021-12-03 | End: 2022-02-15

## 2021-12-03 RX ORDER — NITROGLYCERIN 0.4 MG/1
TABLET SUBLINGUAL
Qty: 25 TABLET | Refills: 3 | Status: SHIPPED | OUTPATIENT
Start: 2021-12-03 | End: 2022-02-15

## 2021-12-03 NOTE — TELEPHONE ENCOUNTER
Call from patient, he arranged for a new cardiologist appointment in February since Dr. Lopez has retired. He needs a refill of toprol XL and NTG to last through that visit. Refills escripted. Patient states he tends to wash his NTG bottle in the laundry. Reviewed with patient to keep only 3 tabs in his daily bottle and put the rest in his freezer. He will try that out.   Patient states he stopped his statin in the past and will discuss restarting at his February visit. Flp/alt and bmp ordered for NEW RETURN visit with Dr. Kinney.

## 2021-12-26 ENCOUNTER — HEALTH MAINTENANCE LETTER (OUTPATIENT)
Age: 51
End: 2021-12-26

## 2022-01-18 ENCOUNTER — PRE VISIT (OUTPATIENT)
Dept: CARDIOLOGY | Facility: CLINIC | Age: 52
End: 2022-01-18
Payer: COMMERCIAL

## 2022-02-15 ENCOUNTER — OFFICE VISIT (OUTPATIENT)
Dept: CARDIOLOGY | Facility: CLINIC | Age: 52
End: 2022-02-15
Payer: COMMERCIAL

## 2022-02-15 ENCOUNTER — LAB (OUTPATIENT)
Dept: LAB | Facility: CLINIC | Age: 52
End: 2022-02-15
Payer: COMMERCIAL

## 2022-02-15 VITALS
HEART RATE: 97 BPM | WEIGHT: 199.1 LBS | HEIGHT: 68 IN | SYSTOLIC BLOOD PRESSURE: 119 MMHG | BODY MASS INDEX: 30.17 KG/M2 | DIASTOLIC BLOOD PRESSURE: 73 MMHG

## 2022-02-15 DIAGNOSIS — E78.5 HYPERLIPIDEMIA LDL GOAL <70: ICD-10-CM

## 2022-02-15 DIAGNOSIS — Z72.0 TOBACCO ABUSE DISORDER: ICD-10-CM

## 2022-02-15 DIAGNOSIS — I25.10 CORONARY ARTERY DISEASE INVOLVING NATIVE CORONARY ARTERY OF NATIVE HEART WITHOUT ANGINA PECTORIS: ICD-10-CM

## 2022-02-15 DIAGNOSIS — E66.811 CLASS 1 OBESITY DUE TO EXCESS CALORIES WITHOUT SERIOUS COMORBIDITY WITH BODY MASS INDEX (BMI) OF 30.0 TO 30.9 IN ADULT: ICD-10-CM

## 2022-02-15 DIAGNOSIS — I25.10 CORONARY ARTERY DISEASE INVOLVING NATIVE CORONARY ARTERY OF NATIVE HEART WITHOUT ANGINA PECTORIS: Primary | ICD-10-CM

## 2022-02-15 DIAGNOSIS — I87.2 VENOUS (PERIPHERAL) INSUFFICIENCY: ICD-10-CM

## 2022-02-15 DIAGNOSIS — R60.0 PERIPHERAL EDEMA: ICD-10-CM

## 2022-02-15 DIAGNOSIS — I45.6 SHORT PR-NORMAL QRS COMPLEX SYNDROME: ICD-10-CM

## 2022-02-15 DIAGNOSIS — E66.09 CLASS 1 OBESITY DUE TO EXCESS CALORIES WITHOUT SERIOUS COMORBIDITY WITH BODY MASS INDEX (BMI) OF 30.0 TO 30.9 IN ADULT: ICD-10-CM

## 2022-02-15 LAB
ALT SERPL W P-5'-P-CCNC: 29 U/L (ref 0–70)
ANION GAP SERPL CALCULATED.3IONS-SCNC: 5 MMOL/L (ref 3–14)
BUN SERPL-MCNC: 17 MG/DL (ref 7–30)
CALCIUM SERPL-MCNC: 8.8 MG/DL (ref 8.5–10.1)
CHLORIDE BLD-SCNC: 107 MMOL/L (ref 94–109)
CHOLEST SERPL-MCNC: 202 MG/DL
CO2 SERPL-SCNC: 26 MMOL/L (ref 20–32)
CREAT SERPL-MCNC: 0.82 MG/DL (ref 0.66–1.25)
FASTING STATUS PATIENT QL REPORTED: YES
GFR SERPL CREATININE-BSD FRML MDRD: >90 ML/MIN/1.73M2
GLUCOSE BLD-MCNC: 105 MG/DL (ref 70–99)
HDLC SERPL-MCNC: 44 MG/DL
LDLC SERPL CALC-MCNC: 141 MG/DL
NONHDLC SERPL-MCNC: 158 MG/DL
POTASSIUM BLD-SCNC: 4.2 MMOL/L (ref 3.4–5.3)
SODIUM SERPL-SCNC: 138 MMOL/L (ref 133–144)
TRIGL SERPL-MCNC: 87 MG/DL

## 2022-02-15 PROCEDURE — 80061 LIPID PANEL: CPT | Performed by: INTERNAL MEDICINE

## 2022-02-15 PROCEDURE — 99215 OFFICE O/P EST HI 40 MIN: CPT | Performed by: INTERNAL MEDICINE

## 2022-02-15 PROCEDURE — 36415 COLL VENOUS BLD VENIPUNCTURE: CPT | Performed by: INTERNAL MEDICINE

## 2022-02-15 PROCEDURE — 80048 BASIC METABOLIC PNL TOTAL CA: CPT | Performed by: INTERNAL MEDICINE

## 2022-02-15 PROCEDURE — 84460 ALANINE AMINO (ALT) (SGPT): CPT | Performed by: INTERNAL MEDICINE

## 2022-02-15 RX ORDER — METOPROLOL SUCCINATE 25 MG/1
25 TABLET, EXTENDED RELEASE ORAL DAILY
Qty: 90 TABLET | Refills: 3 | Status: SHIPPED | OUTPATIENT
Start: 2022-02-15 | End: 2023-03-30

## 2022-02-15 RX ORDER — NITROGLYCERIN 0.4 MG/1
TABLET SUBLINGUAL
Qty: 25 TABLET | Refills: 3 | Status: SHIPPED | OUTPATIENT
Start: 2022-02-15 | End: 2023-03-30

## 2022-02-15 RX ORDER — ROSUVASTATIN CALCIUM 40 MG/1
40 TABLET, COATED ORAL DAILY
Qty: 90 TABLET | Refills: 3 | Status: SHIPPED | OUTPATIENT
Start: 2022-02-15 | End: 2023-03-07

## 2022-02-15 ASSESSMENT — MIFFLIN-ST. JEOR: SCORE: 1742.61

## 2022-02-15 NOTE — PROGRESS NOTES
HPI and Plan:   See dictation    Today's clinic visit entailed:  Review of the result(s) of each unique test - Coronary angiogram, EKG, echocardiogram, lab work  The following tests were independently interpreted by me as noted in my documentation: EKG, coronary angiogram  Ordering of each unique test  Prescription drug management  45 minutes spent on the date of the encounter doing chart review, history and exam, documentation and further activities per the note  Provider  Link to MDM Help Grid     The level of medical decision making during this visit was of moderate complexity.      Orders Placed This Encounter   Procedures     Lipid Profile     ALT     Lipid Profile     ALT     Follow-Up with Cardiology RADHA     Follow-Up with Cardiology       Orders Placed This Encounter   Medications     Multiple Vitamin (MULTIVITAMIN ADULT PO)     metoprolol succinate ER (TOPROL-XL) 25 MG 24 hr tablet     Sig: Take 1 tablet (25 mg) by mouth daily     Dispense:  90 tablet     Refill:  3     rosuvastatin (CRESTOR) 40 MG tablet     Sig: Take 1 tablet (40 mg) by mouth daily     Dispense:  90 tablet     Refill:  3     nitroGLYcerin (NITROSTAT) 0.4 MG sublingual tablet     Sig: PLACE 1 TAB TO TONGUE EVERY 5 MINS AS NEEDED-CHEST PAIN. CALL 911 AFTER 3 DOSES IF SYMPTOMS PERSIST     Dispense:  25 tablet     Refill:  3       Medications Discontinued During This Encounter   Medication Reason     metoprolol succinate ER (TOPROL-XL) 25 MG 24 hr tablet Reorder     nitroGLYcerin (NITROSTAT) 0.4 MG sublingual tablet Reorder         Encounter Diagnoses   Name Primary?     Coronary artery disease involving native coronary artery of native heart without angina pectoris Yes     Hyperlipidemia LDL goal <70      Short DC-normal QRS complex syndrome      Peripheral edema      Venous (peripheral) insufficiency      Tobacco abuse disorder      Class 1 obesity due to excess calories without serious comorbidity with body mass index (BMI) of 30.0 to  30.9 in adult        CURRENT MEDICATIONS:  Current Outpatient Medications   Medication Sig Dispense Refill     aspirin EC 81 MG EC tablet Take 1 tablet (81 mg) by mouth daily 90 tablet 3     medical cannabis (Patient's own supply) See Admin Instructions (The purpose of this order is to document that the patient reports taking medical cannabis.  This is not a prescription, and is not used to certify that the patient has a qualifying medical condition.)       metoprolol succinate ER (TOPROL-XL) 25 MG 24 hr tablet Take 1 tablet (25 mg) by mouth daily 90 tablet 3     Multiple Vitamin (MULTIVITAMIN ADULT PO)        nitroGLYcerin (NITROSTAT) 0.4 MG sublingual tablet PLACE 1 TAB TO TONGUE EVERY 5 MINS AS NEEDED-CHEST PAIN. CALL 911 AFTER 3 DOSES IF SYMPTOMS PERSIST 25 tablet 3     rosuvastatin (CRESTOR) 40 MG tablet Take 1 tablet (40 mg) by mouth daily 90 tablet 3       ALLERGIES     Allergies   Allergen Reactions     Adhesive Tape Rash     dermabond  dermabond         PAST MEDICAL HISTORY:  Past Medical History:   Diagnosis Date     Achilles tendonitis      Ankle edema      Arthritis      Coronary artery disease     cardiac cath 11/2019: no intervention; cath 2016:  + PIERRE to OM1, PIERRE to LAd, PIERRE to ramus     Hernia, abdominal      Hyperlipidemia      Incidental lung nodule      Osgood-Schlatter/osteochondroses     left     Parkinsons disease (H)      WPW (Joshua-Parkinson-White syndrome)        PAST SURGICAL HISTORY:  Past Surgical History:   Procedure Laterality Date     ARTHROSCOPY KNEE  5/8/2012    Procedure:ARTHROSCOPY KNEE; left knee arthroscopy, partial medial and lateral meniscectomy ; Surgeon:MARIA M THOMAS; Location: OR     CV HEART CATHETERIZATION WITH POSSIBLE INTERVENTION N/A 11/19/2019    Procedure: Heart Catheterization with Possible Intervention;  Surgeon: Heydi Bro MD;  Location:  HEART CARDIAC CATH LAB     HERNIA REPAIR       ORTHOPEDIC SURGERY      left leg achilles rupture      ORTHOPEDIC SURGERY      right hip replacemnent     PENIS SURGERY       SEPTOPLASTY       VASECTOMY         FAMILY HISTORY:  Family History   Problem Relation Age of Onset     Diabetes Father        SOCIAL HISTORY:  Social History     Socioeconomic History     Marital status:      Spouse name: seperated     Number of children: 2     Years of education: None     Highest education level: None   Occupational History     Occupation: self     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   Tobacco Use     Smoking status: Current Every Day Smoker     Packs/day: 0.25     Years: 35.00     Pack years: 8.75     Types: Vaping Device     Start date:      Last attempt to quit: 2019     Years since quittin.2     Smokeless tobacco: Never Used   Substance and Sexual Activity     Alcohol use: Not Currently     Alcohol/week: 0.0 standard drinks     Comment: once weekly     Drug use: Yes     Types: Marijuana     Sexual activity: Yes     Partners: Female   Other Topics Concern     Parent/sibling w/ CABG, MI or angioplasty before 65F 55M? No      Service Not Asked     Blood Transfusions Not Asked     Caffeine Concern No     Occupational Exposure Not Asked     Hobby Hazards Not Asked     Sleep Concern Yes     Stress Concern Yes     Weight Concern Yes     Comment: weight loss     Special Diet Not Asked     Back Care Not Asked     Exercise Yes     Comment: cardiac rehab 3 days week     Bike Helmet Not Asked     Seat Belt Not Asked     Self-Exams Not Asked   Social History Narrative     None     Social Determinants of Health     Financial Resource Strain: Not on file   Food Insecurity: Not on file   Transportation Needs: Not on file   Physical Activity: Not on file   Stress: Not on file   Social Connections: Not on file   Intimate Partner Violence: Not on file   Housing Stability: Not on file       Review of Systems:  Skin:  Negative       Eyes:  Negative      ENT:  Negative      Respiratory:  Negative       Cardiovascular:  chest  "pain;Negative;edema;lightheadedness;dizziness Positive for;palpitations palpitations: feels like a flutter that lasts for a few seconds, happens rarely  Gastroenterology: Negative      Genitourinary:  Negative      Musculoskeletal:  Negative back pain;neck pain    Neurologic:  Negative headaches    Psychiatric:  Negative      Heme/Lymph/Imm:  Positive for allergies    Endocrine:  Negative        Physical Exam:  Vitals: /73 (BP Location: Left arm, Patient Position: Sitting)   Pulse 97   Ht 1.727 m (5' 8\")   Wt 90.3 kg (199 lb 1.6 oz)   BMI 30.27 kg/m      Constitutional:  cooperative, alert and oriented, well developed, well nourished, in no acute distress overweight      Skin:  warm and dry to the touch, no apparent skin lesions or masses noted          Head:  normocephalic, no masses or lesions        Eyes:  pupils equal and round, conjunctivae and lids unremarkable, sclera white, no xanthalasma, EOMS intact, no nystagmus        Lymph:      ENT:  no pallor or cyanosis, dentition good        Neck:           Respiratory:  normal breath sounds, clear to auscultation, normal A-P diameter, normal symmetry, normal respiratory excursion, no use of accessory muscles         Cardiac: regular rhythm;no murmurs, gallops or rubs detected                pulses full and equal                                        GI:           Extremities and Muscular Skeletal:  no spinal abnormalities noted;normal muscle strength and tone   bilateral LE edema;trace;varicose vein     left arm in a sling    Neurological:  no gross motor deficits        Psych:  affect appropriate, oriented to time, person and place        CC  Prema Lopez MD  [address]  [place] [id number]              "

## 2022-02-15 NOTE — PROGRESS NOTES
Service Date: 02/15/2022    CLINIC VISIT    HISTORY OF PRESENT ILLNESS:  Cynthia is a very nice 51-year-old gentleman who I saw once in the past in 2016.  He is a very nice gentleman with past medical history significant for ongoing tobacco abuse, hypercholesterolemia (off medications), a non-ST segment elevation myocardial infarction in 06/2016 at which time Dr. Wiley placed drug-eluting stents to his left anterior descending artery, ramus intermedius branch and first obtuse marginal.  This left him with an occluded continuation of right coronary artery with posterolateral branches filling by left-to-right collaterals.  Ejection fraction was 50%-55%.    Of note, he carries a diagnosis of Joshua-Parkinson-White, first described at [facility] in 2011.  This was asymptomatic.  He was seen in consultation by Dr. Damion Toledo who recommended outpatient Holter and stress testing.    More recently, in 2019, as part of a preoperative evaluation, he underwent a stress nuclear scan which demonstrated a moderate-sized, partially reversible inferior, inferolateral segment and a small basilar anterior area of reversibility.  Ejection fraction was 45%.  Followup echocardiogram estimated his ejection fraction to be 50%-55% without focal wall motion abnormalities or significant valvular disease.  An angiogram also in 2019 demonstrated all stents to be widely patent.  However, he had scattered mild-to-moderate disease including distal left main, multiple lesions in his right coronary artery, left anterior descending artery and circumflex coronary artery.  None appeared to be flow limiting.  He was cleared for surgery and underwent hip surgeries, bilateral knee surgeries, all without events.    Unfortunately, he stopped exercising for a while, was trying to quit smoking by switching to vaping and has gained 23 pounds of weight since last August.  Of note, he also underwent a herniorrhaphy.    Cynthia returns to clinic stating he  thinks he is doing well from a cardiac standpoint.  He is back working out 2-3 times a week, doing a combination of 45 minutes of cardiac and then some resistance activity.  None of this causes him any symptoms.  He asks about whether the [facility] Program is restarting again.    He notes no side effects with his current medical regimen, although he has an aversion to medications and had stopped his statin; previously, on atorvastatin 40.  He also asks about his nitroglycerin.  He states he carries them around.  He states some have turned to dust.  He has had them for years.  He has not opened any of them.    ASSESSMENT AND PLAN:  Dustin appears to be doing well from a cardiac standpoint without clinical evidence of ischemia, heart failure or significant arrhythmia.  I did look at his angiogram.  He does have diffuse scattered disease in some high-risk locations and we need to be very aggressive with his risk factor modification and I have emphasized that to him.    First and foremost, he has to get off the cigarettes altogether.  He states he is only smoking 1-2 cigarettes a day.  I have told him, in the short run, we will accept vaping as an alternative but he needs to get rid of the cigarettes altogether first and foremost and then after that, we will try to get rid of vaping.    I have asked him to try to increase his exercise to 4-5 times a week for aerobic activity.    I have emphasized the importance of getting on a statin.  We pulled up his old lab, compared to 09/2020 when cholesterol was 125, HDL was 43, LDL 66, triglycerides 80.  Currently, cholesterol 202, HDL 44, , triglycerides 87.  We talked about the fact that being on a statin cuts his risk of a heart attack in half.  I will put him on rosuvastatin 40, as I think it is the best, most powerful statin.  He has had problems with HDL deficiency in the past and its side effect profile is amongst the best.  We will check a fasting lipid profile and ALT  in 1 month with my RADHA.  We will also follow up on his cigarette smoking at that time.  I did reassure him that when he was on the statin before, he was having no liver problems, as I showed him normal ALTs.    He does have some peripheral edema and it looks like some venous insufficiency.  This is only trace bilaterally.  We did not talk about this or focus on this much.  We can talk about it more down the road.  Again, with his bilateral hip and knee problems, it is not unexpected to have some peripheral edema.    We will continue him on his aspirin and metoprolol.  I sent in refills today in addition to a new prescription for rosuvastatin.    Creatinine is normal.  Normal electrolytes.    Over 50 minutes was spent with Tammi today.  We will plan on annual followups and have him call me if he should develop any symptoms.    Adalid Kinney MD, Located within [facility]        D: 02/15/2022   T: 02/15/2022   MT: vanita    Name:     TAMMI WEBSTERKatey  MRN:      [id number]        Account:      [id number]   :      1970           Service Date: 02/15/2022       Document: [id number]

## 2022-02-15 NOTE — LETTER
2/15/2022       RE: Dustin Avila  [address] [place] [address]     Dear Colleague,    Thank you for referring your patient, Dustin Avila, to the [facility] at [facility]. Please see a copy of my visit note below.    HPI and Plan:   See dictation    Today's clinic visit entailed:  Review of the result(s) of each unique test - Coronary angiogram, EKG, echocardiogram, lab work  The following tests were independently interpreted by me as noted in my documentation: EKG, coronary angiogram  Ordering of each unique test  Prescription drug management  45 minutes spent on the date of the encounter doing chart review, history and exam, documentation and further activities per the note  Provider  Link to MDM Help Grid     The level of medical decision making during this visit was of moderate complexity.      Orders Placed This Encounter   Procedures     Lipid Profile     ALT     Lipid Profile     ALT     Follow-Up with Cardiology RADHA     Follow-Up with Cardiology       Orders Placed This Encounter   Medications     Multiple Vitamin (MULTIVITAMIN ADULT PO)     metoprolol succinate ER (TOPROL-XL) 25 MG 24 hr tablet     Sig: Take 1 tablet (25 mg) by mouth daily     Dispense:  90 tablet     Refill:  3     rosuvastatin (CRESTOR) 40 MG tablet     Sig: Take 1 tablet (40 mg) by mouth daily     Dispense:  90 tablet     Refill:  3     nitroGLYcerin (NITROSTAT) 0.4 MG sublingual tablet     Sig: PLACE 1 TAB TO TONGUE EVERY 5 MINS AS NEEDED-CHEST PAIN. CALL 911 AFTER 3 DOSES IF SYMPTOMS PERSIST     Dispense:  25 tablet     Refill:  3       Medications Discontinued During This Encounter   Medication Reason     metoprolol succinate ER (TOPROL-XL) 25 MG 24 hr tablet Reorder     nitroGLYcerin (NITROSTAT) 0.4 MG sublingual tablet Reorder         Encounter Diagnoses   Name Primary?     Coronary artery disease involving native coronary artery of native heart  without angina pectoris Yes     Hyperlipidemia LDL goal <70      Short ID-normal QRS complex syndrome      Peripheral edema      Venous (peripheral) insufficiency      Tobacco abuse disorder      Class 1 obesity due to excess calories without serious comorbidity with body mass index (BMI) of 30.0 to 30.9 in adult        CURRENT MEDICATIONS:  Current Outpatient Medications   Medication Sig Dispense Refill     aspirin EC 81 MG EC tablet Take 1 tablet (81 mg) by mouth daily 90 tablet 3     medical cannabis (Patient's own supply) See Admin Instructions (The purpose of this order is to document that the patient reports taking medical cannabis.  This is not a prescription, and is not used to certify that the patient has a qualifying medical condition.)       metoprolol succinate ER (TOPROL-XL) 25 MG 24 hr tablet Take 1 tablet (25 mg) by mouth daily 90 tablet 3     Multiple Vitamin (MULTIVITAMIN ADULT PO)        nitroGLYcerin (NITROSTAT) 0.4 MG sublingual tablet PLACE 1 TAB TO TONGUE EVERY 5 MINS AS NEEDED-CHEST PAIN. CALL 911 AFTER 3 DOSES IF SYMPTOMS PERSIST 25 tablet 3     rosuvastatin (CRESTOR) 40 MG tablet Take 1 tablet (40 mg) by mouth daily 90 tablet 3       ALLERGIES     Allergies   Allergen Reactions     Adhesive Tape Rash     dermabond  dermabond         PAST MEDICAL HISTORY:  Past Medical History:   Diagnosis Date     Achilles tendonitis      Ankle edema      Arthritis      Coronary artery disease     cardiac cath 11/2019: no intervention; cath 2016:  + PIERRE to OM1, PIERRE to LAd, PIERRE to ramus     Hernia, abdominal      Hyperlipidemia      Incidental lung nodule      Osgood-Schlatter/osteochondroses     left     Parkinsons disease (H)      WPW (Joshua-Parkinson-White syndrome)        PAST SURGICAL HISTORY:  Past Surgical History:   Procedure Laterality Date     ARTHROSCOPY KNEE  5/8/2012    Procedure:ARTHROSCOPY KNEE; left knee arthroscopy, partial medial and lateral meniscectomy ; Surgeon:MARIA M THOMAS;  Location:[facility] OR     CV HEART CATHETERIZATION WITH POSSIBLE INTERVENTION N/A 2019    Procedure: Heart Catheterization with Possible Intervention;  Surgeon: Heydi Bro MD;  Location:  HEART CARDIAC CATH LAB     HERNIA REPAIR       ORTHOPEDIC SURGERY      left leg achilles rupture     ORTHOPEDIC SURGERY      right hip replacemnent     PENIS SURGERY       SEPTOPLASTY       VASECTOMY         FAMILY HISTORY:  Family History   Problem Relation Age of Onset     Diabetes Father        SOCIAL HISTORY:  Social History     Socioeconomic History     Marital status:      Spouse name: seperated     Number of children: 2     Years of education: None     Highest education level: None   Occupational History     Occupation: self     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   Tobacco Use     Smoking status: Current Every Day Smoker     Packs/day: 0.25     Years: 35.00     Pack years: 8.75     Types: Vaping Device     Start date:      Last attempt to quit: 2019     Years since quittin.2     Smokeless tobacco: Never Used   Substance and Sexual Activity     Alcohol use: Not Currently     Alcohol/week: 0.0 standard drinks     Comment: once weekly     Drug use: Yes     Types: Marijuana     Sexual activity: Yes     Partners: Female   Other Topics Concern     Parent/sibling w/ CABG, MI or angioplasty before 65F 55M? No      Service Not Asked     Blood Transfusions Not Asked     Caffeine Concern No     Occupational Exposure Not Asked     Hobby Hazards Not Asked     Sleep Concern Yes     Stress Concern Yes     Weight Concern Yes     Comment: weight loss     Special Diet Not Asked     Back Care Not Asked     Exercise Yes     Comment: cardiac rehab 3 days week     Bike Helmet Not Asked     Seat Belt Not Asked     Self-Exams Not Asked   Social History Narrative     None     Social Determinants of Health     Financial Resource Strain: Not on file   Food Insecurity: Not on file   Transportation Needs: Not on file   Physical  "Activity: Not on file   Stress: Not on file   Social Connections: Not on file   Intimate Partner Violence: Not on file   Housing Stability: Not on file       Review of Systems:  Skin:  Negative       Eyes:  Negative      ENT:  Negative      Respiratory:  Negative       Cardiovascular:  chest pain;Negative;edema;lightheadedness;dizziness Positive for;palpitations palpitations: feels like a flutter that lasts for a few seconds, happens rarely  Gastroenterology: Negative      Genitourinary:  Negative      Musculoskeletal:  Negative back pain;neck pain    Neurologic:  Negative headaches    Psychiatric:  Negative      Heme/Lymph/Imm:  Positive for allergies    Endocrine:  Negative        Physical Exam:  Vitals: /73 (BP Location: Left arm, Patient Position: Sitting)   Pulse 97   Ht 1.727 m (5' 8\")   Wt 90.3 kg (199 lb 1.6 oz)   BMI 30.27 kg/m      Constitutional:  cooperative, alert and oriented, well developed, well nourished, in no acute distress overweight      Skin:  warm and dry to the touch, no apparent skin lesions or masses noted          Head:  normocephalic, no masses or lesions        Eyes:  pupils equal and round, conjunctivae and lids unremarkable, sclera white, no xanthalasma, EOMS intact, no nystagmus        Lymph:      ENT:  no pallor or cyanosis, dentition good        Neck:           Respiratory:  normal breath sounds, clear to auscultation, normal A-P diameter, normal symmetry, normal respiratory excursion, no use of accessory muscles         Cardiac: regular rhythm;no murmurs, gallops or rubs detected                pulses full and equal                                        GI:           Extremities and Muscular Skeletal:  no spinal abnormalities noted;normal muscle strength and tone   bilateral LE edema;trace;varicose vein     left arm in a sling    Neurological:  no gross motor deficits        Psych:  affect appropriate, oriented to time, person and place        CC  Prema Lopez, " MD  [address]  [place] [address]      Service Date: 02/15/2022    CLINIC VISIT    HISTORY OF PRESENT ILLNESS:  Cynthia is a very nice 51-year-old gentleman who I saw once in the past in 2016.  He is a very nice gentleman with past medical history significant for ongoing tobacco abuse, hypercholesterolemia (off medications), a non-ST segment elevation myocardial infarction in 06/2016 at which time Dr. Wiley placed drug-eluting stents to his left anterior descending artery, ramus intermedius branch and first obtuse marginal.  This left him with an occluded continuation of right coronary artery with posterolateral branches filling by left-to-right collaterals.  Ejection fraction was 50%-55%.    Of note, he carries a diagnosis of Joshua-Parkinson-White, first described at [facility] in 2011.  This was asymptomatic.  He was seen in consultation by Dr. Damion Toledo who recommended outpatient Holter and stress testing.    More recently, in 2019, as part of a preoperative evaluation, he underwent a stress nuclear scan which demonstrated a moderate-sized, partially reversible inferior, inferolateral segment and a small basilar anterior area of reversibility.  Ejection fraction was 45%.  Followup echocardiogram estimated his ejection fraction to be 50%-55% without focal wall motion abnormalities or significant valvular disease.  An angiogram also in 2019 demonstrated all stents to be widely patent.  However, he had scattered mild-to-moderate disease including distal left main, multiple lesions in his right coronary artery, left anterior descending artery and circumflex coronary artery.  None appeared to be flow limiting.  He was cleared for surgery and underwent hip surgeries, bilateral knee surgeries, all without events.    Unfortunately, he stopped exercising for a while, was trying to quit smoking by switching to vaping and has gained 23 pounds of weight since last August.  Of note, he also underwent a  herniorrhaphy.    Cynthia returns to clinic stating he thinks he is doing well from a cardiac standpoint.  He is back working out 2-3 times a week, doing a combination of 45 minutes of cardiac and then some resistance activity.  None of this causes him any symptoms.  He asks about whether the [facility] Program is restarting again.    He notes no side effects with his current medical regimen, although he has an aversion to medications and had stopped his statin; previously, on atorvastatin 40.  He also asks about his nitroglycerin.  He states he carries them around.  He states some have turned to dust.  He has had them for years.  He has not opened any of them.    ASSESSMENT AND PLAN:  Dustin appears to be doing well from a cardiac standpoint without clinical evidence of ischemia, heart failure or significant arrhythmia.  I did look at his angiogram.  He does have diffuse scattered disease in some high-risk locations and we need to be very aggressive with his risk factor modification and I have emphasized that to him.    First and foremost, he has to get off the cigarettes altogether.  He states he is only smoking 1-2 cigarettes a day.  I have told him, in the short run, we will accept vaping as an alternative but he needs to get rid of the cigarettes altogether first and foremost and then after that, we will try to get rid of vaping.    I have asked him to try to increase his exercise to 4-5 times a week for aerobic activity.    I have emphasized the importance of getting on a statin.  We pulled up his old lab, compared to 09/2020 when cholesterol was 125, HDL was 43, LDL 66, triglycerides 80.  Currently, cholesterol 202, HDL 44, , triglycerides 87.  We talked about the fact that being on a statin cuts his risk of a heart attack in half.  I will put him on rosuvastatin 40, as I think it is the best, most powerful statin.  He has had problems with HDL deficiency in the past and its side effect profile is amongst the  best.  We will check a fasting lipid profile and ALT in 1 month with my RADHA.  We will also follow up on his cigarette smoking at that time.  I did reassure him that when he was on the statin before, he was having no liver problems, as I showed him normal ALTs.    He does have some peripheral edema and it looks like some venous insufficiency.  This is only trace bilaterally.  We did not talk about this or focus on this much.  We can talk about it more down the road.  Again, with his bilateral hip and knee problems, it is not unexpected to have some peripheral edema.    We will continue him on his aspirin and metoprolol.  I sent in refills today in addition to a new prescription for rosuvastatin.    Creatinine is normal.  Normal electrolytes.    Over 50 minutes was spent with Tammi today.  We will plan on annual followups and have him call me if he should develop any symptoms.      Adalid Kinney MD, [facility]        D: 02/15/2022   T: 02/15/2022   MT: vanita    Name:     TAMMI WEBSTERKatey  MRN:      [id number]        Account:      [id number]   :      1970           Service Date: 02/15/2022     Document: [id number]

## 2022-02-15 NOTE — LETTER
2/15/2022    Wesly Liz, NP  [address]  [place] [address]    RE: Dustin Avila       Dear Colleague,     I had the pleasure of seeing Dustin Avila in the [facility].  HPI and Plan:   See dictation    Today's clinic visit entailed:  Review of the result(s) of each unique test - Coronary angiogram, EKG, echocardiogram, lab work  The following tests were independently interpreted by me as noted in my documentation: EKG, coronary angiogram  Ordering of each unique test  Prescription drug management  45 minutes spent on the date of the encounter doing chart review, history and exam, documentation and further activities per the note  Provider  Link to MDM Help Grid     The level of medical decision making during this visit was of moderate complexity.      Orders Placed This Encounter   Procedures     Lipid Profile     ALT     Lipid Profile     ALT     Follow-Up with Cardiology RADHA     Follow-Up with Cardiology       Orders Placed This Encounter   Medications     Multiple Vitamin (MULTIVITAMIN ADULT PO)     metoprolol succinate ER (TOPROL-XL) 25 MG 24 hr tablet     Sig: Take 1 tablet (25 mg) by mouth daily     Dispense:  90 tablet     Refill:  3     rosuvastatin (CRESTOR) 40 MG tablet     Sig: Take 1 tablet (40 mg) by mouth daily     Dispense:  90 tablet     Refill:  3     nitroGLYcerin (NITROSTAT) 0.4 MG sublingual tablet     Sig: PLACE 1 TAB TO TONGUE EVERY 5 MINS AS NEEDED-CHEST PAIN. CALL 911 AFTER 3 DOSES IF SYMPTOMS PERSIST     Dispense:  25 tablet     Refill:  3       Medications Discontinued During This Encounter   Medication Reason     metoprolol succinate ER (TOPROL-XL) 25 MG 24 hr tablet Reorder     nitroGLYcerin (NITROSTAT) 0.4 MG sublingual tablet Reorder         Encounter Diagnoses   Name Primary?     Coronary artery disease involving native coronary artery of native heart without angina pectoris Yes     Hyperlipidemia LDL goal <70      Short OR-normal QRS complex  syndrome      Peripheral edema      Venous (peripheral) insufficiency      Tobacco abuse disorder      Class 1 obesity due to excess calories without serious comorbidity with body mass index (BMI) of 30.0 to 30.9 in adult        CURRENT MEDICATIONS:  Current Outpatient Medications   Medication Sig Dispense Refill     aspirin EC 81 MG EC tablet Take 1 tablet (81 mg) by mouth daily 90 tablet 3     medical cannabis (Patient's own supply) See Admin Instructions (The purpose of this order is to document that the patient reports taking medical cannabis.  This is not a prescription, and is not used to certify that the patient has a qualifying medical condition.)       metoprolol succinate ER (TOPROL-XL) 25 MG 24 hr tablet Take 1 tablet (25 mg) by mouth daily 90 tablet 3     Multiple Vitamin (MULTIVITAMIN ADULT PO)        nitroGLYcerin (NITROSTAT) 0.4 MG sublingual tablet PLACE 1 TAB TO TONGUE EVERY 5 MINS AS NEEDED-CHEST PAIN. CALL 911 AFTER 3 DOSES IF SYMPTOMS PERSIST 25 tablet 3     rosuvastatin (CRESTOR) 40 MG tablet Take 1 tablet (40 mg) by mouth daily 90 tablet 3       ALLERGIES     Allergies   Allergen Reactions     Adhesive Tape Rash     dermabond  dermabond         PAST MEDICAL HISTORY:  Past Medical History:   Diagnosis Date     Achilles tendonitis      Ankle edema      Arthritis      Coronary artery disease     cardiac cath 11/2019: no intervention; cath 2016:  + PIERRE to OM1, PIERRE to LAd, PIERRE to ramus     Hernia, abdominal      Hyperlipidemia      Incidental lung nodule      Osgood-Schlatter/osteochondroses     left     Parkinsons disease (H)      WPW (Joshua-Parkinson-White syndrome)        PAST SURGICAL HISTORY:  Past Surgical History:   Procedure Laterality Date     ARTHROSCOPY KNEE  5/8/2012    Procedure:ARTHROSCOPY KNEE; left knee arthroscopy, partial medial and lateral meniscectomy ; Surgeon:MARIA M THOMAS; Location:[facility] OR     CV HEART CATHETERIZATION WITH POSSIBLE INTERVENTION N/A 11/19/2019    Procedure:  Heart Catheterization with Possible Intervention;  Surgeon: Heydi Bro MD;  Location:  HEART CARDIAC CATH LAB     HERNIA REPAIR       ORTHOPEDIC SURGERY      left leg achilles rupture     ORTHOPEDIC SURGERY      right hip replacemnent     PENIS SURGERY       SEPTOPLASTY       VASECTOMY         FAMILY HISTORY:  Family History   Problem Relation Age of Onset     Diabetes Father        SOCIAL HISTORY:  Social History     Socioeconomic History     Marital status:      Spouse name: seperated     Number of children: 2     Years of education: None     Highest education level: None   Occupational History     Occupation: self     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   Tobacco Use     Smoking status: Current Every Day Smoker     Packs/day: 0.25     Years: 35.00     Pack years: 8.75     Types: Vaping Device     Start date:      Last attempt to quit: 2019     Years since quittin.2     Smokeless tobacco: Never Used   Substance and Sexual Activity     Alcohol use: Not Currently     Alcohol/week: 0.0 standard drinks     Comment: once weekly     Drug use: Yes     Types: Marijuana     Sexual activity: Yes     Partners: Female   Other Topics Concern     Parent/sibling w/ CABG, MI or angioplasty before 65F 55M? No      Service Not Asked     Blood Transfusions Not Asked     Caffeine Concern No     Occupational Exposure Not Asked     Hobby Hazards Not Asked     Sleep Concern Yes     Stress Concern Yes     Weight Concern Yes     Comment: weight loss     Special Diet Not Asked     Back Care Not Asked     Exercise Yes     Comment: cardiac rehab 3 days week     Bike Helmet Not Asked     Seat Belt Not Asked     Self-Exams Not Asked   Social History Narrative     None     Social Determinants of Health     Financial Resource Strain: Not on file   Food Insecurity: Not on file   Transportation Needs: Not on file   Physical Activity: Not on file   Stress: Not on file   Social Connections: Not on file   Intimate Partner  "Violence: Not on file   Housing Stability: Not on file       Review of Systems:  Skin:  Negative       Eyes:  Negative      ENT:  Negative      Respiratory:  Negative       Cardiovascular:  chest pain;Negative;edema;lightheadedness;dizziness Positive for;palpitations palpitations: feels like a flutter that lasts for a few seconds, happens rarely  Gastroenterology: Negative      Genitourinary:  Negative      Musculoskeletal:  Negative back pain;neck pain    Neurologic:  Negative headaches    Psychiatric:  Negative      Heme/Lymph/Imm:  Positive for allergies    Endocrine:  Negative        Physical Exam:  Vitals: /73 (BP Location: Left arm, Patient Position: Sitting)   Pulse 97   Ht 1.727 m (5' 8\")   Wt 90.3 kg (199 lb 1.6 oz)   BMI 30.27 kg/m      Constitutional:  cooperative, alert and oriented, well developed, well nourished, in no acute distress overweight      Skin:  warm and dry to the touch, no apparent skin lesions or masses noted          Head:  normocephalic, no masses or lesions        Eyes:  pupils equal and round, conjunctivae and lids unremarkable, sclera white, no xanthalasma, EOMS intact, no nystagmus        Lymph:      ENT:  no pallor or cyanosis, dentition good        Neck:           Respiratory:  normal breath sounds, clear to auscultation, normal A-P diameter, normal symmetry, normal respiratory excursion, no use of accessory muscles         Cardiac: regular rhythm;no murmurs, gallops or rubs detected                pulses full and equal                                        GI:           Extremities and Muscular Skeletal:  no spinal abnormalities noted;normal muscle strength and tone   bilateral LE edema;trace;varicose vein     left arm in a sling    Neurological:  no gross motor deficits        Psych:  affect appropriate, oriented to time, person and place        CC  Prema Lopez MD  [address]  [place] [address]                  Thank you for allowing me to participate " in the care of your patient.      Sincerely,     Adalid Kinney MD     [facility]  cc:   Prema Lopez MD  [address] [place] [address]  [place] [address]

## 2022-03-02 ENCOUNTER — TELEPHONE (OUTPATIENT)
Dept: FAMILY MEDICINE | Facility: CLINIC | Age: 52
End: 2022-03-02
Payer: COMMERCIAL

## 2022-03-02 DIAGNOSIS — T78.40XS ALLERGIC REACTION, SEQUELA: Primary | ICD-10-CM

## 2022-03-02 NOTE — TELEPHONE ENCOUNTER
Patient called, stated he has had a bad reaction to surgery glue back on 9/2/21 and went into the Urgent Care in [place] to get it checked out.     Patient then got billed under a different department instead of urgent care so now his insurance is telling him that if he gets a Retro referral to [facility] in [place], his insurance will be billed correctly and patient will not have to pay out of pocket.     So patient would like a Retro referral to [facility] in [place] as soon as possible so that he does not have to pay out of pocket.     Patient would like a call back to know if this is possible. Requests call too [phone], may leave a detailed voice message.     Please advise,   Thank you!

## 2022-03-03 NOTE — TELEPHONE ENCOUNTER
Patient Contact    Attempt # 1    Was call answered?  No.  Left message on voicemail with information to call triage back.    On call back:     - see Alex's message below.    Karon GU RN  EP Triage

## 2022-03-04 NOTE — TELEPHONE ENCOUNTER
I cannot find an Urgent care referral so I placed a primary care referral and put urgent care in the notes.  Routing to Joanie Reynolds.  Please fax to [facility]

## 2022-03-04 NOTE — TELEPHONE ENCOUNTER
Patient states that this is a referral for UC.   Please fax referral to [facility] in [place]. Senait Lewis RN

## 2022-03-10 ENCOUNTER — TELEPHONE (OUTPATIENT)
Dept: CARDIOLOGY | Facility: CLINIC | Age: 52
End: 2022-03-10
Payer: COMMERCIAL

## 2022-03-10 NOTE — TELEPHONE ENCOUNTER
----- Message from Diane Gonzalez sent at 3/10/2022  2:09 PM CST -----  Regarding: No BMP order

## 2022-03-10 NOTE — TELEPHONE ENCOUNTER
BMP done at Dr. Kinney visit 2-15-22. Does not need another one.   Rosuvastatin 40 mg started, repeat FLP/ALT in 1 month.     Message sent back to lab.

## 2022-03-14 NOTE — TELEPHONE ENCOUNTER
Patient Contact     Attempt to call pt and left a detailed vm with information below from pt referral rep. Also left clinic phone number if pt has any further questions or concerns.     Alla SANTOS RN  [facility]    Detail Level: Simple Otc Regimen: Aquaphor QD

## 2022-03-14 NOTE — TELEPHONE ENCOUNTER
Alex- We cannot do referrals primary care to primary care. We also cannot any longer do  retro referrals. Also this patient has an insurance that does not require referrals for payment. This is not a referral issue. He will have to settle this with [facility].    Joanie PABON-referral rep

## 2022-03-15 ENCOUNTER — LAB (OUTPATIENT)
Dept: LAB | Facility: CLINIC | Age: 52
End: 2022-03-15
Payer: COMMERCIAL

## 2022-03-15 ENCOUNTER — OFFICE VISIT (OUTPATIENT)
Dept: CARDIOLOGY | Facility: CLINIC | Age: 52
End: 2022-03-15
Attending: INTERNAL MEDICINE
Payer: COMMERCIAL

## 2022-03-15 VITALS
WEIGHT: 199 LBS | HEIGHT: 68 IN | DIASTOLIC BLOOD PRESSURE: 71 MMHG | HEART RATE: 65 BPM | SYSTOLIC BLOOD PRESSURE: 110 MMHG | BODY MASS INDEX: 30.16 KG/M2

## 2022-03-15 DIAGNOSIS — I25.10 CORONARY ARTERY DISEASE INVOLVING NATIVE CORONARY ARTERY OF NATIVE HEART WITHOUT ANGINA PECTORIS: ICD-10-CM

## 2022-03-15 LAB
ALT SERPL W P-5'-P-CCNC: 31 U/L (ref 0–70)
CHOLEST SERPL-MCNC: 108 MG/DL
FASTING STATUS PATIENT QL REPORTED: YES
HDLC SERPL-MCNC: 46 MG/DL
LDLC SERPL CALC-MCNC: 51 MG/DL
NONHDLC SERPL-MCNC: 62 MG/DL
TRIGL SERPL-MCNC: 54 MG/DL

## 2022-03-15 PROCEDURE — 80061 LIPID PANEL: CPT | Performed by: INTERNAL MEDICINE

## 2022-03-15 PROCEDURE — 84460 ALANINE AMINO (ALT) (SGPT): CPT | Performed by: INTERNAL MEDICINE

## 2022-03-15 PROCEDURE — 36415 COLL VENOUS BLD VENIPUNCTURE: CPT | Performed by: INTERNAL MEDICINE

## 2022-03-15 PROCEDURE — 99214 OFFICE O/P EST MOD 30 MIN: CPT | Performed by: PHYSICIAN ASSISTANT

## 2022-03-15 NOTE — PROGRESS NOTES
Cardiology Clinic Progress Note    Dustin Avila MRN# [id number]   YOB: 1970 Age: 51 year old   Primary cardiologist: Dr. Kinney         Assessment and Plan:     In summary, Dustin Avila presents today for a 1 month follow-up visit, after the initiation of Crestor.    1. CAD s/p PCI with multivessel disease per 2019 angiogram. Denies angina.   2. Hypercholesterolemia with statin aversion, recently started on Crestor.  Lipid panel is now well controlled.  3. Longstanding tobacco use with recent cessation, currently vaping.    Plan:  - Congratulated on smoking cessation, encouraged to stop vaping when able.  -Continue Crestor.  -Continue cardiovascular exercise, monitor for the development of anginal symptoms, and alert us in this case.    Follow-up:  -With Dr. Kinney in 6 months.        History of Presenting Illness:      Dustin Avila is a pleasant 51 year old patient who presents today for a one month follow up on hypercholesterolemia.    The patient has a history of the following -   1.  CAD, NSTEMI in 2016 status post PIERRE to LAD, ramus intermedius, and OM1.  Known occluded continuation of RCA with PLB filling by left-to-right collaterals.  Patent stents with scattered, mild to moderate disease including the distal left main, RCA, LAD and circumflex per angiogram in 2019, performed for preop clearance.  2.  Low-normal ejection fraction of 50%, per 2019 TTE.  3.  Joshua-Parkinson-White, first described at [facility] in 2011, asymptomatic.  Followed by EP Dr. Toledo.  4.  Hypercholesterolemia.  Aversion to statin therapy.  5.  Ongoing tobacco abuse.  6.  Hemorrhoidal bleeding, status post herniorrhaphy.  7.  Chronic mild peripheral edema, probable venous insufficiency.    In brief, this patient recently met with Dr. Kinney to establish care after Dr. Lopez retired.  He had stopped exercising, and was trying to quit smoking, and subsequently had gained about 25 pounds over the prior 6  "months.  He had recently gotten back into exercise, working out 2-3 times a week, doing a combination of 45 minutes of cardiac and then some resistance activity, without problem.  He had stopped taking his statin due to a general aversion to it, not side effects.  LDL was 141.  The importance of aggressive risk factor modification was discussed, and rosuvastatin 40 mg nightly was initiated.    Today, he returns to clinic stating he is feeling very well from a cardiac standpoint. Patient denies chest pain, shortness of breath, PND, orthopnea, edema, claudication, palpitations, near syncope or syncope.  He stopped buying cigarettes after he met with Dr. Kinney, and still bums 1 from a friend occasionally, but does not crave it.  He is vaping, but plans to cut back and ultimately stop that as well.  His lipid panel shows significant improvement, with an LDL down to 51, HDL 46, triglycerides of 54, total cholesterol 108. ALT is WNL. BP is well-controlled.  He has noted no side effects of statin therapy.         Review of Systems:     12-pt ROS is negative except for as noted in the HPI.          Physical Exam:     Vitals: /71 (BP Location: Right arm, Cuff Size: Adult Regular)   Pulse 65   Ht 1.727 m (5' 8\")   Wt 90.3 kg (199 lb)   BMI 30.26 kg/m    Wt Readings from Last 10 Encounters:   03/15/22 90.3 kg (199 lb)   02/15/22 90.3 kg (199 lb 1.6 oz)   08/20/21 79.8 kg (176 lb)   02/01/21 76.2 kg (168 lb)   12/18/20 78.5 kg (173 lb)   12/04/20 78 kg (172 lb)   09/24/20 83.5 kg (184 lb)   09/18/20 83.9 kg (185 lb)   06/24/20 90.7 kg (200 lb)   05/21/20 91.6 kg (202 lb)       Constitutional:  Patient is pleasant, alert, cooperative, and in NAD.  HEENT:  NCAT. PERRLA. EOM's intact.   Neck:  CVP appears normal. No carotid bruits.   Pulmonary: Normal respiratory effort. CTAB.   Cardiac: RRR, normal S1/S2, no S3/S4, no murmur or rub.   Abdomen:  Non-tender abdomen, no hepatosplenomegaly appreciated.   Vascular: Pulses " Price (Do Not Change): 0.00 in the upper and lower extremities are 2+ and equal bilaterally.  Extremities: No edema, erythema, cyanosis or tenderness appreciated.  Skin:  No rashes or lesions appreciated.   Neurological:  No gross motor or sensory deficits.   Psych: Appropriate affect.        Data:     Labs reviewed:  Recent Labs   Lab Test 02/15/22  0931 08/20/21  0945 09/18/20  1218   * 160* 66   HDL 44 45 43   NHDL 158* 179* 82   CHOL 202* 224* 125   TRIG 87 95 80       Lab Results   Component Value Date    WBC 8.6 12/04/2020    RBC 4.55 12/04/2020    HGB 14.1 12/04/2020    HCT 42.7 12/04/2020    MCV 94 12/04/2020    MCH 31.0 12/04/2020    MCHC 33.0 12/04/2020    RDW 14.4 12/04/2020     12/04/2020       Lab Results   Component Value Date     02/15/2022     12/04/2020    POTASSIUM 4.2 02/15/2022    POTASSIUM 4.4 12/04/2020    CHLORIDE 107 02/15/2022    CHLORIDE 109 12/04/2020    CO2 26 02/15/2022    CO2 28 12/04/2020    ANIONGAP 5 02/15/2022    ANIONGAP 3 12/04/2020     (H) 02/15/2022    GLC 91 12/04/2020    BUN 17 02/15/2022    BUN 14 12/04/2020    CR 0.82 02/15/2022    CR 0.88 12/04/2020    GFRESTIMATED >90 02/15/2022    GFRESTIMATED >90 12/04/2020    GFRESTBLACK >90 12/04/2020    GABRIELA 8.8 02/15/2022    GABRIELA 9.8 12/04/2020      Lab Results   Component Value Date    AST 17 03/09/2020    ALT 31 03/15/2022    ALT 33 03/09/2020       Lab Results   Component Value Date    A1C 4.8 06/05/2016       Lab Results   Component Value Date    INR 0.96 03/09/2020    INR 0.91 02/27/2020           Problem List:     Patient Active Problem List   Diagnosis     Hyperlipidemia LDL goal <70     Anomalous atrioventricular excitation     Short NC-normal QRS complex syndrome     Coronary artery disease involving native coronary artery of native heart without angina pectoris     Hyperlipidemia     JOHN (generalized anxiety disorder)     Anxiety     Old myocardial infarction     Peripheral edema     Venous (peripheral) insufficiency      Detail Level: Simple Tobacco abuse disorder     Class 1 obesity due to excess calories without serious comorbidity with body mass index (BMI) of 30.0 to 30.9 in adult           Medications:     Current Outpatient Medications   Medication Sig Dispense Refill     aspirin EC 81 MG EC tablet Take 1 tablet (81 mg) by mouth daily 90 tablet 3     medical cannabis (Patient's own supply) See Admin Instructions (The purpose of this order is to document that the patient reports taking medical cannabis.  This is not a prescription, and is not used to certify that the patient has a qualifying medical condition.)       metoprolol succinate ER (TOPROL-XL) 25 MG 24 hr tablet Take 1 tablet (25 mg) by mouth daily 90 tablet 3     Multiple Vitamin (MULTIVITAMIN ADULT PO)        nitroGLYcerin (NITROSTAT) 0.4 MG sublingual tablet PLACE 1 TAB TO TONGUE EVERY 5 MINS AS NEEDED-CHEST PAIN. CALL 911 AFTER 3 DOSES IF SYMPTOMS PERSIST 25 tablet 3     rosuvastatin (CRESTOR) 40 MG tablet Take 1 tablet (40 mg) by mouth daily 90 tablet 3           Past Medical History:     Past Medical History:   Diagnosis Date     Achilles tendonitis      Ankle edema      Arthritis      Coronary artery disease     cardiac cath 11/2019: no intervention; cath 2016:  + PIERRE to OM1, PIERRE to LAd, PIERRE to ramus     Hernia, abdominal      Hyperlipidemia      Incidental lung nodule      Osgood-Schlatter/osteochondroses     left     Parkinsons disease (H)      WPW (Joshua-Parkinson-White syndrome)      Past Surgical History:   Procedure Laterality Date     ARTHROSCOPY KNEE  5/8/2012    Procedure:ARTHROSCOPY KNEE; left knee arthroscopy, partial medial and lateral meniscectomy ; Surgeon:MARIA M THOMAS; Location: OR     CV HEART CATHETERIZATION WITH POSSIBLE INTERVENTION N/A 11/19/2019    Procedure: Heart Catheterization with Possible Intervention;  Surgeon: Heydi Bro MD;  Location:  HEART CARDIAC CATH LAB     HERNIA REPAIR       ORTHOPEDIC SURGERY      left leg achilles rupture      ORTHOPEDIC SURGERY      right hip replacemnent     PENIS SURGERY       SEPTOPLASTY       VASECTOMY       Family History   Problem Relation Age of Onset     Diabetes Father      Social History     Socioeconomic History     Marital status:      Spouse name: seperated     Number of children: 2     Years of education: Not on file     Highest education level: Not on file   Occupational History     Occupation: self     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   Tobacco Use     Smoking status: Former Smoker     Packs/day: 0.25     Years: 35.00     Pack years: 8.75     Types: Vaping Device     Start date:      Quit date: 2019     Years since quittin.3     Smokeless tobacco: Never Used   Substance and Sexual Activity     Alcohol use: Not Currently     Alcohol/week: 0.0 standard drinks     Comment: once weekly     Drug use: Yes     Types: Marijuana     Sexual activity: Yes     Partners: Female   Other Topics Concern     Parent/sibling w/ CABG, MI or angioplasty before 65F 55M? No      Service Not Asked     Blood Transfusions Not Asked     Caffeine Concern No     Occupational Exposure Not Asked     Hobby Hazards Not Asked     Sleep Concern Yes     Stress Concern Yes     Weight Concern Yes     Comment: weight loss     Special Diet Not Asked     Back Care Not Asked     Exercise Yes     Comment: cardiac rehab 3 days week     Bike Helmet Not Asked     Seat Belt Not Asked     Self-Exams Not Asked   Social History Narrative     Not on file     Social Determinants of Health     Financial Resource Strain: Not on file   Food Insecurity: Not on file   Transportation Needs: Not on file   Physical Activity: Not on file   Stress: Not on file   Social Connections: Not on file   Intimate Partner Violence: Not on file   Housing Stability: Not on file           Allergies:   Adhesive tape      RAI Sorensen [facility]  Pager: [phone]    Today's clinic visit entailed:  Review of the result(s) of each  Instructions: This plan will send the code FBSD to the PM system.  DO NOT or CHANGE the price. unique test - lipid panel, ALT  I spent a total of 30 minutes on the day of the visit.   Time spent doing chart review, history and exam, documentation and further activities per the note  Provider  Link to MDM Help Grid     The level of medical decision making during this visit was of moderate complexity.

## 2022-03-15 NOTE — LETTER
3/15/2022    Wesly Liz, NP  [address]  [place] [address]    RE: Dustin Avila       Dear Colleague,     I had the pleasure of seeing Dustin Avila in the [facility].    Cardiology Clinic Progress Note    Dustin Avila MRN# [id number]   YOB: 1970 Age: 51 year old   Primary cardiologist: Dr. Kinney         Assessment and Plan:     In summary, Dustin Avila presents today for a 1 month follow-up visit, after the initiation of Crestor.    1. CAD s/p PCI with multivessel disease per 2019 angiogram. Denies angina.   2. Hypercholesterolemia with statin aversion, recently started on Crestor.  Lipid panel is now well controlled.  3. Longstanding tobacco use with recent cessation, currently vaping.    Plan:  - Congratulated on smoking cessation, encouraged to stop vaping when able.  -Continue Crestor.  -Continue cardiovascular exercise, monitor for the development of anginal symptoms, and alert us in this case.    Follow-up:  -With Dr. Kinney in 6 months.        History of Presenting Illness:      Dustin Avila is a pleasant 51 year old patient who presents today for a one month follow up on hypercholesterolemia.    The patient has a history of the following -   1.  CAD, NSTEMI in 2016 status post PIERRE to LAD, ramus intermedius, and OM1.  Known occluded continuation of RCA with PLB filling by left-to-right collaterals.  Patent stents with scattered, mild to moderate disease including the distal left main, RCA, LAD and circumflex per angiogram in 2019, performed for preop clearance.  2.  Low-normal ejection fraction of 50%, per 2019 TTE.  3.  Joshua-Parkinson-White, first described at [facility] in 2011, asymptomatic.  Followed by EP Dr. Toledo.  4.  Hypercholesterolemia.  Aversion to statin therapy.  5.  Ongoing tobacco abuse.  6.  Hemorrhoidal bleeding, status post herniorrhaphy.  7.  Chronic mild peripheral edema, probable venous insufficiency.    In  "brief, this patient recently met with Dr. Kinney to establish care after Dr. Lopez retired.  He had stopped exercising, and was trying to quit smoking, and subsequently had gained about 25 pounds over the prior 6 months.  He had recently gotten back into exercise, working out 2-3 times a week, doing a combination of 45 minutes of cardiac and then some resistance activity, without problem.  He had stopped taking his statin due to a general aversion to it, not side effects.  LDL was 141.  The importance of aggressive risk factor modification was discussed, and rosuvastatin 40 mg nightly was initiated.    Today, he returns to clinic stating he is feeling very well from a cardiac standpoint. Patient denies chest pain, shortness of breath, PND, orthopnea, edema, claudication, palpitations, near syncope or syncope.  He stopped buying cigarettes after he met with Dr. Kinney, and still bums 1 from a friend occasionally, but does not crave it.  He is vaping, but plans to cut back and ultimately stop that as well.  His lipid panel shows significant improvement, with an LDL down to 51, HDL 46, triglycerides of 54, total cholesterol 108. ALT is WNL. BP is well-controlled.  He has noted no side effects of statin therapy.         Review of Systems:     12-pt ROS is negative except for as noted in the HPI.          Physical Exam:     Vitals: /71 (BP Location: Right arm, Cuff Size: Adult Regular)   Pulse 65   Ht 1.727 m (5' 8\")   Wt 90.3 kg (199 lb)   BMI 30.26 kg/m    Wt Readings from Last 10 Encounters:   03/15/22 90.3 kg (199 lb)   02/15/22 90.3 kg (199 lb 1.6 oz)   08/20/21 79.8 kg (176 lb)   02/01/21 76.2 kg (168 lb)   12/18/20 78.5 kg (173 lb)   12/04/20 78 kg (172 lb)   09/24/20 83.5 kg (184 lb)   09/18/20 83.9 kg (185 lb)   06/24/20 90.7 kg (200 lb)   05/21/20 91.6 kg (202 lb)       Constitutional:  Patient is pleasant, alert, cooperative, and in NAD.  HEENT:  NCAT. PERRLA. EOM's intact.   Neck:  CVP appears " normal. No carotid bruits.   Pulmonary: Normal respiratory effort. CTAB.   Cardiac: RRR, normal S1/S2, no S3/S4, no murmur or rub.   Abdomen:  Non-tender abdomen, no hepatosplenomegaly appreciated.   Vascular: Pulses in the upper and lower extremities are 2+ and equal bilaterally.  Extremities: No edema, erythema, cyanosis or tenderness appreciated.  Skin:  No rashes or lesions appreciated.   Neurological:  No gross motor or sensory deficits.   Psych: Appropriate affect.        Data:     Labs reviewed:  Recent Labs   Lab Test 02/15/22  0931 08/20/21  0945 09/18/20  1218   * 160* 66   HDL 44 45 43   NHDL 158* 179* 82   CHOL 202* 224* 125   TRIG 87 95 80       Lab Results   Component Value Date    WBC 8.6 12/04/2020    RBC 4.55 12/04/2020    HGB 14.1 12/04/2020    HCT 42.7 12/04/2020    MCV 94 12/04/2020    MCH 31.0 12/04/2020    MCHC 33.0 12/04/2020    RDW 14.4 12/04/2020     12/04/2020       Lab Results   Component Value Date     02/15/2022     12/04/2020    POTASSIUM 4.2 02/15/2022    POTASSIUM 4.4 12/04/2020    CHLORIDE 107 02/15/2022    CHLORIDE 109 12/04/2020    CO2 26 02/15/2022    CO2 28 12/04/2020    ANIONGAP 5 02/15/2022    ANIONGAP 3 12/04/2020     (H) 02/15/2022    GLC 91 12/04/2020    BUN 17 02/15/2022    BUN 14 12/04/2020    CR 0.82 02/15/2022    CR 0.88 12/04/2020    GFRESTIMATED >90 02/15/2022    GFRESTIMATED >90 12/04/2020    GFRESTBLACK >90 12/04/2020    GABRIELA 8.8 02/15/2022    GABRIELA 9.8 12/04/2020      Lab Results   Component Value Date    AST 17 03/09/2020    ALT 31 03/15/2022    ALT 33 03/09/2020       Lab Results   Component Value Date    A1C 4.8 06/05/2016       Lab Results   Component Value Date    INR 0.96 03/09/2020    INR 0.91 02/27/2020           Problem List:     Patient Active Problem List   Diagnosis     Hyperlipidemia LDL goal <70     Anomalous atrioventricular excitation     Short AK-normal QRS complex syndrome     Coronary artery disease involving native  coronary artery of native heart without angina pectoris     Hyperlipidemia     JOHN (generalized anxiety disorder)     Anxiety     Old myocardial infarction     Peripheral edema     Venous (peripheral) insufficiency     Tobacco abuse disorder     Class 1 obesity due to excess calories without serious comorbidity with body mass index (BMI) of 30.0 to 30.9 in adult           Medications:     Current Outpatient Medications   Medication Sig Dispense Refill     aspirin EC 81 MG EC tablet Take 1 tablet (81 mg) by mouth daily 90 tablet 3     medical cannabis (Patient's own supply) See Admin Instructions (The purpose of this order is to document that the patient reports taking medical cannabis.  This is not a prescription, and is not used to certify that the patient has a qualifying medical condition.)       metoprolol succinate ER (TOPROL-XL) 25 MG 24 hr tablet Take 1 tablet (25 mg) by mouth daily 90 tablet 3     Multiple Vitamin (MULTIVITAMIN ADULT PO)        nitroGLYcerin (NITROSTAT) 0.4 MG sublingual tablet PLACE 1 TAB TO TONGUE EVERY 5 MINS AS NEEDED-CHEST PAIN. CALL 911 AFTER 3 DOSES IF SYMPTOMS PERSIST 25 tablet 3     rosuvastatin (CRESTOR) 40 MG tablet Take 1 tablet (40 mg) by mouth daily 90 tablet 3           Past Medical History:     Past Medical History:   Diagnosis Date     Achilles tendonitis      Ankle edema      Arthritis      Coronary artery disease     cardiac cath 11/2019: no intervention; cath 2016:  + PIERRE to OM1, PIERRE to LAd, PIERRE to ramus     Hernia, abdominal      Hyperlipidemia      Incidental lung nodule      Osgood-Schlatter/osteochondroses     left     Parkinsons disease (H)      WPW (Joshua-Parkinson-White syndrome)      Past Surgical History:   Procedure Laterality Date     ARTHROSCOPY KNEE  5/8/2012    Procedure:ARTHROSCOPY KNEE; left knee arthroscopy, partial medial and lateral meniscectomy ; Surgeon:MARIA M THOMAS; Location:[facility] OR     CV HEART CATHETERIZATION WITH POSSIBLE INTERVENTION N/A  2019    Procedure: Heart Catheterization with Possible Intervention;  Surgeon: Heydi Bro MD;  Location:  HEART CARDIAC CATH LAB     HERNIA REPAIR       ORTHOPEDIC SURGERY      left leg achilles rupture     ORTHOPEDIC SURGERY      right hip replacemnent     PENIS SURGERY       SEPTOPLASTY       VASECTOMY       Family History   Problem Relation Age of Onset     Diabetes Father      Social History     Socioeconomic History     Marital status:      Spouse name: seperated     Number of children: 2     Years of education: Not on file     Highest education level: Not on file   Occupational History     Occupation: self     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   Tobacco Use     Smoking status: Former Smoker     Packs/day: 0.25     Years: 35.00     Pack years: 8.75     Types: Vaping Device     Start date:      Quit date: 2019     Years since quittin.3     Smokeless tobacco: Never Used   Substance and Sexual Activity     Alcohol use: Not Currently     Alcohol/week: 0.0 standard drinks     Comment: once weekly     Drug use: Yes     Types: Marijuana     Sexual activity: Yes     Partners: Female   Other Topics Concern     Parent/sibling w/ CABG, MI or angioplasty before 65F 55M? No      Service Not Asked     Blood Transfusions Not Asked     Caffeine Concern No     Occupational Exposure Not Asked     Hobby Hazards Not Asked     Sleep Concern Yes     Stress Concern Yes     Weight Concern Yes     Comment: weight loss     Special Diet Not Asked     Back Care Not Asked     Exercise Yes     Comment: cardiac rehab 3 days week     Bike Helmet Not Asked     Seat Belt Not Asked     Self-Exams Not Asked   Social History Narrative     Not on file     Social Determinants of Health     Financial Resource Strain: Not on file   Food Insecurity: Not on file   Transportation Needs: Not on file   Physical Activity: Not on file   Stress: Not on file   Social Connections: Not on file   Intimate Partner Violence: Not on  file   Housing Stability: Not on file           Allergies:   Adhesive tape      RAI Sorensen [facility] - [facility]  Pager: [phone]    Today's clinic visit entailed:  Review of the result(s) of each unique test - lipid panel, ALT  I spent a total of 30 minutes on the day of the visit.   Time spent doing chart review, history and exam, documentation and further activities per the note  Provider  Link to [facility] Help Grid     The level of medical decision making during this visit was of moderate complexity.    Thank you for allowing me to participate in the care of your patient.      Sincerely,   RAI Sorensen [facility]  cc:   Adalid Kinney MD  [address] [address]

## 2022-03-15 NOTE — PATIENT INSTRUCTIONS
Today's Plan:   - Your lipid panel today looks excellent on Crestor.  - Please return to see Dr. Kinney in six months, with an echocardiogram prior.     If you have questions or concerns please call my nurse team at [phone].   Scheduling phone number: [phone]  For after hours urgent concerns call [phone] option 2.   Reminder: Please bring in all current medications, over the counter supplements and vitamin bottles to your next appointment.    It was a pleasure seeing you today!     Veena Gibson PA-C    Component      Latest Ref Rng & Units 2/15/2022 3/15/2022   Cholesterol      <200 mg/dL 202 (H) 108   Triglycerides      <150 mg/dL 87 54   HDL Cholesterol      >=40 mg/dL 44 46   LDL Cholesterol Calculated      <=100 mg/dL 141 (H) 51   Non HDL Cholesterol      <130 mg/dL 158 (H) 62   Patient Fasting > 8hrs?       Yes Yes   ALT      0 - 70 U/L 29 31

## 2022-03-21 ENCOUNTER — APPOINTMENT (OUTPATIENT)
Dept: GENERAL RADIOLOGY | Facility: CLINIC | Age: 52
End: 2022-03-21
Attending: PHYSICIAN ASSISTANT
Payer: COMMERCIAL

## 2022-03-21 ENCOUNTER — APPOINTMENT (OUTPATIENT)
Dept: CARDIOLOGY | Facility: CLINIC | Age: 52
End: 2022-03-21
Attending: HOSPITALIST
Payer: COMMERCIAL

## 2022-03-21 ENCOUNTER — HOSPITAL ENCOUNTER (OUTPATIENT)
Facility: CLINIC | Age: 52
Setting detail: OBSERVATION
Discharge: HOME | End: 2022-03-22
Attending: PHYSICIAN ASSISTANT | Admitting: PHYSICIAN ASSISTANT
Payer: COMMERCIAL

## 2022-03-21 DIAGNOSIS — R07.9 CHEST PAIN, UNSPECIFIED TYPE: ICD-10-CM

## 2022-03-21 DIAGNOSIS — I25.2 OLD MYOCARDIAL INFARCTION: Primary | ICD-10-CM

## 2022-03-21 LAB
ANION GAP SERPL CALCULATED.3IONS-SCNC: 6 MMOL/L (ref 3–14)
ATRIAL RATE - MUSE: 68 BPM
BASOPHILS # BLD AUTO: 0.1 10E3/UL (ref 0–0.2)
BASOPHILS NFR BLD AUTO: 1 %
BUN SERPL-MCNC: 13 MG/DL (ref 7–30)
CALCIUM SERPL-MCNC: 8.9 MG/DL (ref 8.5–10.1)
CHLORIDE BLD-SCNC: 108 MMOL/L (ref 94–109)
CO2 SERPL-SCNC: 23 MMOL/L (ref 20–32)
CREAT SERPL-MCNC: 0.85 MG/DL (ref 0.66–1.25)
D DIMER PPP FEU-MCNC: 0.32 UG/ML FEU (ref 0–0.5)
DIASTOLIC BLOOD PRESSURE - MUSE: NORMAL MMHG
EOSINOPHIL # BLD AUTO: 0.3 10E3/UL (ref 0–0.7)
EOSINOPHIL NFR BLD AUTO: 4 %
ERYTHROCYTE [DISTWIDTH] IN BLOOD BY AUTOMATED COUNT: 13.4 % (ref 10–15)
GFR SERPL CREATININE-BSD FRML MDRD: >90 ML/MIN/1.73M2
GLUCOSE BLD-MCNC: 132 MG/DL (ref 70–99)
HBA1C MFR BLD: 5.2 % (ref 0–5.6)
HCT VFR BLD AUTO: 43.9 % (ref 40–53)
HGB BLD-MCNC: 14 G/DL (ref 13.3–17.7)
IMM GRANULOCYTES # BLD: 0.1 10E3/UL
IMM GRANULOCYTES NFR BLD: 1 %
INTERPRETATION ECG - MUSE: NORMAL
LVEF ECHO: NORMAL
LYMPHOCYTES # BLD AUTO: 1.8 10E3/UL (ref 0.8–5.3)
LYMPHOCYTES NFR BLD AUTO: 22 %
MCH RBC QN AUTO: 29.9 PG (ref 26.5–33)
MCHC RBC AUTO-ENTMCNC: 31.9 G/DL (ref 31.5–36.5)
MCV RBC AUTO: 94 FL (ref 78–100)
MONOCYTES # BLD AUTO: 0.6 10E3/UL (ref 0–1.3)
MONOCYTES NFR BLD AUTO: 7 %
NEUTROPHILS # BLD AUTO: 5.6 10E3/UL (ref 1.6–8.3)
NEUTROPHILS NFR BLD AUTO: 65 %
NRBC # BLD AUTO: 0 10E3/UL
NRBC BLD AUTO-RTO: 0 /100
NT-PROBNP SERPL-MCNC: 72 PG/ML (ref 0–450)
P AXIS - MUSE: 54 DEGREES
PLATELET # BLD AUTO: 270 10E3/UL (ref 150–450)
POTASSIUM BLD-SCNC: 4 MMOL/L (ref 3.4–5.3)
PR INTERVAL - MUSE: 116 MS
QRS DURATION - MUSE: 88 MS
QT - MUSE: 374 MS
QTC - MUSE: 397 MS
R AXIS - MUSE: 55 DEGREES
RBC # BLD AUTO: 4.69 10E6/UL (ref 4.4–5.9)
SARS-COV-2 RNA RESP QL NAA+PROBE: NEGATIVE
SODIUM SERPL-SCNC: 137 MMOL/L (ref 133–144)
SYSTOLIC BLOOD PRESSURE - MUSE: NORMAL MMHG
T AXIS - MUSE: 30 DEGREES
TROPONIN I SERPL HS-MCNC: 4 NG/L
TROPONIN I SERPL HS-MCNC: 4 NG/L
TROPONIN I SERPL HS-MCNC: 5 NG/L
TROPONIN I SERPL HS-MCNC: 6 NG/L
VENTRICULAR RATE- MUSE: 68 BPM
WBC # BLD AUTO: 8.4 10E3/UL (ref 4–11)

## 2022-03-21 PROCEDURE — G0378 HOSPITAL OBSERVATION PER HR: HCPCS

## 2022-03-21 PROCEDURE — 96361 HYDRATE IV INFUSION ADD-ON: CPT

## 2022-03-21 PROCEDURE — 71046 X-RAY EXAM CHEST 2 VIEWS: CPT

## 2022-03-21 PROCEDURE — 99220 PR INITIAL OBSERVATION CARE,LEVEL III: CPT | Performed by: HOSPITALIST

## 2022-03-21 PROCEDURE — C9803 HOPD COVID-19 SPEC COLLECT: HCPCS

## 2022-03-21 PROCEDURE — 250N000009 HC RX 250: Performed by: PHYSICIAN ASSISTANT

## 2022-03-21 PROCEDURE — 83036 HEMOGLOBIN GLYCOSYLATED A1C: CPT | Performed by: HOSPITALIST

## 2022-03-21 PROCEDURE — 80048 BASIC METABOLIC PNL TOTAL CA: CPT | Performed by: PHYSICIAN ASSISTANT

## 2022-03-21 PROCEDURE — 96374 THER/PROPH/DIAG INJ IV PUSH: CPT | Mod: 59

## 2022-03-21 PROCEDURE — 250N000011 HC RX IP 250 OP 636: Performed by: PHYSICIAN ASSISTANT

## 2022-03-21 PROCEDURE — 93005 ELECTROCARDIOGRAM TRACING: CPT

## 2022-03-21 PROCEDURE — 258N000003 HC RX IP 258 OP 636: Performed by: PHYSICIAN ASSISTANT

## 2022-03-21 PROCEDURE — 93306 TTE W/DOPPLER COMPLETE: CPT | Mod: 26 | Performed by: INTERNAL MEDICINE

## 2022-03-21 PROCEDURE — 36415 COLL VENOUS BLD VENIPUNCTURE: CPT | Performed by: HOSPITALIST

## 2022-03-21 PROCEDURE — 99285 EMERGENCY DEPT VISIT HI MDM: CPT | Mod: 25

## 2022-03-21 PROCEDURE — 85025 COMPLETE CBC W/AUTO DIFF WBC: CPT | Performed by: PHYSICIAN ASSISTANT

## 2022-03-21 PROCEDURE — 36415 COLL VENOUS BLD VENIPUNCTURE: CPT | Performed by: PHYSICIAN ASSISTANT

## 2022-03-21 PROCEDURE — U0003 INFECTIOUS AGENT DETECTION BY NUCLEIC ACID (DNA OR RNA); SEVERE ACUTE RESPIRATORY SYNDROME CORONAVIRUS 2 (SARS-COV-2) (CORONAVIRUS DISEASE [COVID-19]), AMPLIFIED PROBE TECHNIQUE, MAKING USE OF HIGH THROUGHPUT TECHNOLOGIES AS DESCRIBED BY CMS-2020-01-R: HCPCS | Performed by: PHYSICIAN ASSISTANT

## 2022-03-21 PROCEDURE — 84484 ASSAY OF TROPONIN QUANT: CPT | Mod: 91 | Performed by: PHYSICIAN ASSISTANT

## 2022-03-21 PROCEDURE — 99214 OFFICE O/P EST MOD 30 MIN: CPT | Mod: 25 | Performed by: INTERNAL MEDICINE

## 2022-03-21 PROCEDURE — 83880 ASSAY OF NATRIURETIC PEPTIDE: CPT | Performed by: HOSPITALIST

## 2022-03-21 PROCEDURE — 93306 TTE W/DOPPLER COMPLETE: CPT

## 2022-03-21 PROCEDURE — 85379 FIBRIN DEGRADATION QUANT: CPT | Performed by: PHYSICIAN ASSISTANT

## 2022-03-21 PROCEDURE — 250N000013 HC RX MED GY IP 250 OP 250 PS 637: Performed by: PHYSICIAN ASSISTANT

## 2022-03-21 PROCEDURE — 84484 ASSAY OF TROPONIN QUANT: CPT | Performed by: HOSPITALIST

## 2022-03-21 RX ORDER — METOPROLOL SUCCINATE 25 MG/1
25 TABLET, EXTENDED RELEASE ORAL DAILY
Status: DISCONTINUED | OUTPATIENT
Start: 2022-03-22 | End: 2022-03-22 | Stop reason: HOSPADM

## 2022-03-21 RX ORDER — NITROGLYCERIN 0.4 MG/1
0.4 TABLET SUBLINGUAL EVERY 5 MIN PRN
Status: COMPLETED | OUTPATIENT
Start: 2022-03-21 | End: 2022-03-21

## 2022-03-21 RX ORDER — NITROGLYCERIN 0.4 MG/1
0.4 TABLET SUBLINGUAL EVERY 5 MIN PRN
Status: DISCONTINUED | OUTPATIENT
Start: 2022-03-21 | End: 2022-03-22 | Stop reason: HOSPADM

## 2022-03-21 RX ORDER — ACETAMINOPHEN 325 MG/1
650 TABLET ORAL EVERY 6 HOURS PRN
Status: DISCONTINUED | OUTPATIENT
Start: 2022-03-21 | End: 2022-03-22 | Stop reason: HOSPADM

## 2022-03-21 RX ORDER — ASPIRIN 81 MG/1
81 TABLET ORAL DAILY
Status: DISCONTINUED | OUTPATIENT
Start: 2022-03-22 | End: 2022-03-22 | Stop reason: HOSPADM

## 2022-03-21 RX ORDER — ROSUVASTATIN CALCIUM 20 MG/1
40 TABLET, COATED ORAL DAILY
Status: DISCONTINUED | OUTPATIENT
Start: 2022-03-22 | End: 2022-03-22 | Stop reason: HOSPADM

## 2022-03-21 RX ORDER — KETOROLAC TROMETHAMINE 15 MG/ML
15 INJECTION, SOLUTION INTRAMUSCULAR; INTRAVENOUS ONCE
Status: COMPLETED | OUTPATIENT
Start: 2022-03-21 | End: 2022-03-21

## 2022-03-21 RX ORDER — ASPIRIN 81 MG/1
243 TABLET, CHEWABLE ORAL ONCE
Status: COMPLETED | OUTPATIENT
Start: 2022-03-21 | End: 2022-03-21

## 2022-03-21 RX ORDER — MAGNESIUM HYDROXIDE/ALUMINUM HYDROXICE/SIMETHICONE 120; 1200; 1200 MG/30ML; MG/30ML; MG/30ML
30 SUSPENSION ORAL EVERY 4 HOURS PRN
Status: DISCONTINUED | OUTPATIENT
Start: 2022-03-21 | End: 2022-03-22 | Stop reason: HOSPADM

## 2022-03-21 RX ORDER — ACETAMINOPHEN 650 MG/1
650 SUPPOSITORY RECTAL EVERY 6 HOURS PRN
Status: DISCONTINUED | OUTPATIENT
Start: 2022-03-21 | End: 2022-03-22 | Stop reason: HOSPADM

## 2022-03-21 RX ADMIN — SODIUM CHLORIDE 1000 ML: 9 INJECTION, SOLUTION INTRAVENOUS at 11:12

## 2022-03-21 RX ADMIN — NITROGLYCERIN 0.4 MG: 0.4 TABLET SUBLINGUAL at 11:26

## 2022-03-21 RX ADMIN — KETOROLAC TROMETHAMINE 15 MG: 15 INJECTION, SOLUTION INTRAMUSCULAR; INTRAVENOUS at 13:35

## 2022-03-21 RX ADMIN — NITROGLYCERIN 0.4 MG: 0.4 TABLET SUBLINGUAL at 11:18

## 2022-03-21 RX ADMIN — LIDOCAINE HYDROCHLORIDE 30 ML: 20 SOLUTION ORAL; TOPICAL at 11:51

## 2022-03-21 RX ADMIN — NITROGLYCERIN 0.4 MG: 0.4 TABLET SUBLINGUAL at 11:12

## 2022-03-21 RX ADMIN — ASPIRIN 81 MG CHEWABLE TABLET 243 MG: 81 TABLET CHEWABLE at 11:08

## 2022-03-21 ASSESSMENT — ENCOUNTER SYMPTOMS
HEADACHES: 0
ABDOMINAL PAIN: 0
SHORTNESS OF BREATH: 0
VOMITING: 0
NAUSEA: 0
COUGH: 0
CHEST TIGHTNESS: 1

## 2022-03-21 NOTE — H&P
[facility]    History and Physical  Hospitalist       Date of Admission:  3/21/2022    Assessment & Plan   Dustin Avila is a 51 year old male who presents with chest pain reminiscent of the chest pain he had during his 2016 heart attack.    Chest pain, atypical  CAD s/p PCI 2016 (PIERRE to LAD, ramus intermedius, OM1) with mild-mod multivessel disease 2019 angiogram  Hypercholesterolemia  Hypertension  Hx WPW 2011  Chest pain started 7am 3/21 while sitting at desk, did not resolve with nitroglycerin at home and he presented to ED. D-dimer 0.32. Trop x2 was 4. Pain unrelieved by nitroglycerin, ASA, GI cocktail in ED. Pain not reproducible with palpation. Minor increase in discomfort with deep breaths. Had spicy meal last night, but no relief with GI cocktail. No recent strenuous upper body activity/exercise. EKG: sinus rhythm. CXR: no effusion, infiltrate or pulm edema.  *10/2019 Lexiscan stress test: medium sized partially reversible inferior and inferoseptal defect with mid-basal inferior scar with small-mod area of mild mark-infarct ischemia in distal inferior, mid-basal inferoseptal and basal inferolateral portion of LV. Mild ischemia seen in basal anterior portion of LV. Mild inferior hypokinesis.  * 3/21/22 Echo: EF 50-55%, no regional WMA. RV normal. Mildly dilated aortic root. No significant valvular pathology  *PTA rosuvastatin 40mg, ASA 81mg, toprol-xl 25mg daily.  - observation admit  - echocardiogram  - cardiology consult  - PRN nitroglycerin  - patient deferred CT PE study due to severe claustrophobia, if pain ongoing and high suspicion and stress test negative could consider with medication/sedation?  - NPO at midnight, plan nuclear stress test (treadmill) in AM  - trend trop further  - telemetry  - PRN EKG for pain  - continue PTA statin, ASA and toprol XL (hold BB prior to test)    Tobacco use   Stopped smoking cigarettes recently, but now vapes  - continue to encourage  "ongoing cessation    Medical marijuana  osteoarthritis  Continue medical marijuana for osteoarthritis    Obesity  He is planning to start exercise plan, but has not yet started. Completed various surgeries in the past 2 years which have improved his joint pain which had been limiting prior.    COVID-19 screening, pending    Clinically Significant Risk Factors Present on Admission                # Platelet Defect: home medication list includes an antiplatelet medication   # Obesity: Estimated body mass index is 30.41 kg/m  as calculated from the following:    Height as of this encounter: 1.727 m (5' 8\").    Weight as of this encounter: 90.7 kg (200 lb).        DVT Prophylaxis: Low Risk/Ambulatory with no VTE prophylaxis indicated  Code Status: Full Code  Expected Discharge:  1-2 days   Anticipated discharge location:  Awaiting care coordination huddle  Delays:     cardiac work-up      Elodia Oconnor DO    Primary Care Physician   Wesly Liz    Chief Complaint   Chest pain    History is obtained from the patient, prior record review    History of Present Illness   Dustin Avila is a 51 year old male who presents with acute onset of chest pain at 7am while sitting at his desk. This pain feels like a pressure on his anterior chest and radiates straight into his back. He had no nausea, diaphoresis with this. Took a nitroglycerin and ASA at home, without relief and he came into ED. This pain reminded him of the substernal chest pressure/pain that he felt with his MI in 2016 (which he apparently ignored for 8 hours at the time), thus he presented earlier for evaluation. He reports minimal increase in pain with deep breath, but otherwise had no chest pain change with nitroglycerin x3, toradol, GI cocktail while in ED. He has not been exercising lately, but has plans to start. He denies any chest pain in the past few years at all. The only change that he can note is that he smoked 2 cigarettes yesterday, after " having quit 1mo prior. He did not feel any immediate difference after the cigarettes. Only felt the pain that came on suddenly this morning after being awake for 2 hours.    Past Medical History    I have reviewed this patient's medical history and updated it with pertinent information if needed.   Past Medical History:   Diagnosis Date     Achilles tendonitis      Ankle edema      Arthritis      Coronary artery disease     cardiac cath 11/2019: no intervention; cath 2016:  + PIERRE to OM1, PIERRE to LAd, PIERRE to ramus     Hernia, abdominal      Hyperlipidemia      Incidental lung nodule      Osgood-Schlatter/osteochondroses     left     Parkinsons disease (H)      WPW (Joshua-Parkinson-White syndrome)        Past Surgical History   I have reviewed this patient's surgical history and updated it with pertinent information if needed.  Past Surgical History:   Procedure Laterality Date     ARTHROSCOPY KNEE  5/8/2012    Procedure:ARTHROSCOPY KNEE; left knee arthroscopy, partial medial and lateral meniscectomy ; Surgeon:MARIA M THOMAS; Location: OR     CV HEART CATHETERIZATION WITH POSSIBLE INTERVENTION N/A 11/19/2019    Procedure: Heart Catheterization with Possible Intervention;  Surgeon: Heydi Bro MD;  Location:  HEART CARDIAC CATH LAB     HERNIA REPAIR       ORTHOPEDIC SURGERY      left leg achilles rupture     ORTHOPEDIC SURGERY      right hip replacemnent     PENIS SURGERY       SEPTOPLASTY       VASECTOMY         Prior to Admission Medications   Prior to Admission Medications   Prescriptions Last Dose Informant Patient Reported? Taking?   aspirin EC 81 MG EC tablet 3/21/2022 at Unknown time  No Yes   Sig: Take 1 tablet (81 mg) by mouth daily   medical cannabis (Patient's own supply)   Yes Yes   Sig: See Admin Instructions (The purpose of this order is to document that the patient reports taking medical cannabis.  This is not a prescription, and is not used to certify that the patient has a qualifying  medical condition.)   metoprolol succinate ER (TOPROL-XL) 25 MG 24 hr tablet 3/21/2022 at Unknown time  No Yes   Sig: Take 1 tablet (25 mg) by mouth daily   nitroGLYcerin (NITROSTAT) 0.4 MG sublingual tablet PRN  No Yes   Sig: PLACE 1 TAB TO TONGUE EVERY 5 MINS AS NEEDED-CHEST PAIN. CALL 911 AFTER 3 DOSES IF SYMPTOMS PERSIST   rosuvastatin (CRESTOR) 40 MG tablet 3/21/2022 at Unknown time  No Yes   Sig: Take 1 tablet (40 mg) by mouth daily      Facility-Administered Medications: None     Allergies   Allergies   Allergen Reactions     Adhesive Tape Rash     dermabond  dermabond         Social History   I have reviewed this patient's social history and updated it with pertinent information if needed. Dustin Avila  reports that he quit smoking about 2 years ago. His smoking use included vaping device. He started smoking about 36 years ago. He has a 8.75 pack-year smoking history. He has never used smokeless tobacco. He reports previous alcohol use. He reports current drug use. Drug: Marijuana.  *stopped smoking cigarettes 1mo ago. Still bums a cigarette occasionally, mostly vapes nicotine and medical marijuana.    Family History   I have reviewed this patient's family history and updated it with pertinent information if needed.   Family History   Problem Relation Age of Onset     Diabetes Father        Review of Systems   The 10 point Review of Systems is negative other than noted in the HPI    Physical Exam   Temp: 98  F (36.7  C) Temp src: Oral BP: 108/80 Pulse: 66   Resp: 11 SpO2: 91 %      Vital Signs with Ranges  Temp:  [98  F (36.7  C)] 98  F (36.7  C)  Pulse:  [61-73] 66  Resp:  [10-18] 11  BP: ()/(76-87) 108/80  SpO2:  [91 %-97 %] 91 %  200 lbs 0 oz    Constitutional: Awake, alert, cooperative, no apparent distress.  Eyes: Conjunctiva and pupils examined and normal.  HEENT: Moist mucous membranes, normal dentition.  Respiratory: Clear to auscultation bilaterally, no crackles or wheezing. No reproducible  chest pain  Cardiovascular: Regular rhythm, rate mid 50s, normal S1 and S2, and no murmur noted.  GI: Soft, non-distended, non-tender, normal bowel sounds.  Lymph/Hematologic: No anterior cervical or supraclavicular adenopathy.  Skin: No rashes, no cyanosis, no edema.  Musculoskeletal: No joint swelling, erythema or tenderness.  Neurologic: Cranial nerves 2-12 intact, normal strength and sensation.  Psychiatric: Alert, oriented to person, place and time, no obvious anxiety or depression.    Data   Data reviewed today:  I personally reviewed EKG: sinus rhythm. CXR: no effusion, infiltrate or pulm edema.  Recent Labs   Lab 03/21/22  1018 03/15/22  0911   WBC 8.4  --    HGB 14.0  --    MCV 94  --      --      --    POTASSIUM 4.0  --    CHLORIDE 108  --    CO2 23  --    BUN 13  --    CR 0.85  --    ANIONGAP 6  --    GABRIELA 8.9  --    *  --    ALT  --  31       Recent Results (from the past 24 hour(s))   Chest XR,  PA & LAT    Narrative    CHEST TWO VIEWS  3/21/2022 12:27 PM     HISTORY:  Chest pain.    COMPARISON: 1/18/2017.      Impression    IMPRESSION: Chest is negative and unchanged. Lungs clear. No pleural  effusions.    RUSSELL BASILIO MD         SYSTEM ID:  [id number]

## 2022-03-21 NOTE — PHARMACY-ADMISSION MEDICATION HISTORY
Pharmacy Medication History  Admission medication history interview status for the 3/21/2022  admission is complete. See EPIC admission navigator for prior to admission medications     Location of Interview: Patient room  Medication history sources: Patient    Significant changes made to the medication list:  Removed multivitamin     In the past week, patient estimated taking medication this percent of the time: greater than 90%    Additional medication history information:   None    Medication reconciliation completed by provider prior to medication history? No    Time spent in this activity: 5 minutes    Prior to Admission medications    Medication Sig Last Dose Taking? Auth Provider   aspirin EC 81 MG EC tablet Take 1 tablet (81 mg) by mouth daily 3/21/2022 at Unknown time Yes Elver Chamberlain MD   medical cannabis (Patient's own supply) See Admin Instructions (The purpose of this order is to document that the patient reports taking medical cannabis.  This is not a prescription, and is not used to certify that the patient has a qualifying medical condition.)  Yes Reported, Patient   metoprolol succinate ER (TOPROL-XL) 25 MG 24 hr tablet Take 1 tablet (25 mg) by mouth daily 3/21/2022 at Unknown time Yes Adalid Kinney MD   nitroGLYcerin (NITROSTAT) 0.4 MG sublingual tablet PLACE 1 TAB TO TONGUE EVERY 5 MINS AS NEEDED-CHEST PAIN. CALL 911 AFTER 3 DOSES IF SYMPTOMS PERSIST PRN Yes Adalid Kinney MD   rosuvastatin (CRESTOR) 40 MG tablet Take 1 tablet (40 mg) by mouth daily 3/21/2022 at Unknown time Yes Adalid Kinney MD       The information provided in this note is only as accurate as the sources available at the time of update(s)

## 2022-03-21 NOTE — PROGRESS NOTES
Medical Cannabis: Registration in the [facility] Medical Cannabis Registry is confirmed.     Medical Cannabis visually inspected and does NOT appear obviously adulterated.

## 2022-03-21 NOTE — ED PROVIDER NOTES
History     Chief Complaint:    Chest Pain      HPI   Dustin Avila is a 51 year old male with a history of coronary artery disease, NSTEMI, presents emergency room today for evaluation of chest pain that started at 7 AM while sitting in his desk.  He says that it is a pressure in the middle of his chest, that radiates to his back, that was associated with feeling warm however no other symptoms such as diaphoresis, nausea or other.  Nothing seems to make the pain better or worse.  He took a baby aspirin and old nitroglycerin with little improvement.  This feels somewhat similar to the last time he had his heart attack.    Review of Systems   Respiratory: Positive for chest tightness. Negative for cough and shortness of breath.    Cardiovascular: Positive for chest pain. Negative for leg swelling.   Gastrointestinal: Negative for abdominal pain, nausea and vomiting.   Neurological: Negative for headaches.   All other systems reviewed and are negative.       Allergies:  Adhesive Tape      Medications:    aspirin EC 81 MG EC tablet  medical cannabis (Patient's own supply)  metoprolol succinate ER (TOPROL-XL) 25 MG 24 hr tablet  nitroGLYcerin (NITROSTAT) 0.4 MG sublingual tablet  rosuvastatin (CRESTOR) 40 MG tablet        Past Medical History:      Past Medical History:   Diagnosis Date     Achilles tendonitis      Ankle edema      Arthritis      Coronary artery disease      Hernia, abdominal      Hyperlipidemia      Incidental lung nodule      Osgood-Schlatter/osteochondroses      Parkinsons disease (H)      WPW (Joshua-Parkinson-White syndrome)      Patient Active Problem List    Diagnosis Date Noted     Chest pain, unspecified type 03/21/2022     Priority: Medium     Peripheral edema 02/15/2022     Priority: Medium     Venous (peripheral) insufficiency 02/15/2022     Priority: Medium     Tobacco abuse disorder 02/15/2022     Priority: Medium     Class 1 obesity due to excess calories without serious comorbidity  with body mass index (BMI) of 30.0 to 30.9 in adult 02/15/2022     Priority: Medium     Old myocardial infarction 04/05/2017     Priority: Medium     Anxiety 03/13/2017     Priority: Medium     JOHN (generalized anxiety disorder) 11/15/2016     Priority: Medium     Short CA-normal QRS complex syndrome      Priority: Medium     Coronary artery disease involving native coronary artery of native heart without angina pectoris      Priority: Medium     cardiac cath 11/2019: no intervention; cath 2016:  + PIERRE to OM1, PIERRE to LAD, PIERRE to ramus       Hyperlipidemia      Priority: Medium     Hyperlipidemia LDL goal <70 06/17/2016     Priority: Medium     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     Priority: Medium     As per history          Past Surgical History:      Past Surgical History:   Procedure Laterality Date     ARTHROSCOPY KNEE  5/8/2012    Procedure:ARTHROSCOPY KNEE; left knee arthroscopy, partial medial and lateral meniscectomy ; Surgeon:MARIA M THOMAS; Location: OR     CV HEART CATHETERIZATION WITH POSSIBLE INTERVENTION N/A 11/19/2019    Procedure: Heart Catheterization with Possible Intervention;  Surgeon: Heydi Bro MD;  Location:  HEART CARDIAC CATH LAB     HERNIA REPAIR       ORTHOPEDIC SURGERY      left leg achilles rupture     ORTHOPEDIC SURGERY      right hip replacemnent     PENIS SURGERY       SEPTOPLASTY       VASECTOMY         Family History:      Family History   Problem Relation Age of Onset     Diabetes Father        Social History:  Pt is alone today    Physical Exam     Patient Vitals for the past 24 hrs:   BP Temp Temp src Pulse Resp SpO2 Height Weight   03/21/22 1215 108/80 -- -- 66 11 91 % -- --   03/21/22 1200 110/76 -- -- 66 13 97 % -- --   03/21/22 1130 99/81 -- -- 64 11 95 % -- --   03/21/22 1125 109/81 -- -- 61 14 95 % -- --   03/21/22 1117 112/76 -- -- 69 12 94 % -- --   03/21/22 1110 122/84 -- -- 70 16 97 % -- --   03/21/22 1030 112/79 -- -- 63 12 94 % -- --  "  03/21/22 1019 118/77 -- -- 63 10 95 % -- --   03/21/22 0944 119/87 98  F (36.7  C) Oral 73 18 97 % 1.727 m (5' 8\") 90.7 kg (200 lb)       Physical Exam  General: Well appearing, pleasant male, resting on exam bed  HEENT: No evidence of trauma.  Conjunctive are clear.  Extraocular eye movements intact.  Neck range of motion intact.  Nose and throat clear.  Respiratory: Good breath sounds bilaterally  Cardiovascular: Normal rate and rhythm   Gastrointestinal: Soft, nontender.   Musculoskeletal: Atraumatic  Skin: Exposed skin clear.  Neurologic: Alert.  Psych:  Patient is cooperative, with normal affect.      Emergency Department Course   ECG:  ECG taken at 0947, ECG read at 1030  Normal sinus rhythm. Normal ECG.    Rate 68 bpm. CA interval 116 ms. QRS duration 88 ms. QT/QTc 374/397 ms. P-R-T axes 54 55 30.     Imaging:  Chest XR,  PA & LAT   Final Result   IMPRESSION: Chest is negative and unchanged. Lungs clear. No pleural   effusions.      RUSSELL BASILIO MD            SYSTEM ID:  [id number]      Echocardiogram Complete    (Results Pending)       Laboratory:  Labs Ordered and Resulted from Time of ED Arrival to Time of ED Departure   BASIC METABOLIC PANEL - Abnormal       Result Value    Sodium 137      Potassium 4.0      Chloride 108      Carbon Dioxide (CO2) 23      Anion Gap 6      Urea Nitrogen 13      Creatinine 0.85      Calcium 8.9      Glucose 132 (*)     GFR Estimate >90     TROPONIN I - Normal    Troponin I High Sensitivity 4     D DIMER QUANTITATIVE - Normal    D-Dimer Quantitative 0.32     TROPONIN I - Normal    Troponin I High Sensitivity 4     CBC WITH PLATELETS AND DIFFERENTIAL    WBC Count 8.4      RBC Count 4.69      Hemoglobin 14.0      Hematocrit 43.9      MCV 94      MCH 29.9      MCHC 31.9      RDW 13.4      Platelet Count 270      % Neutrophils 65      % Lymphocytes 22      % Monocytes 7      % Eosinophils 4      % Basophils 1      % Immature Granulocytes 1      NRBCs per 100 WBC 0      " Absolute Neutrophils 5.6      Absolute Lymphocytes 1.8      Absolute Monocytes 0.6      Absolute Eosinophils 0.3      Absolute Basophils 0.1      Absolute Immature Granulocytes 0.1      Absolute NRBCs 0.0     COVID-19 VIRUS (CORONAVIRUS) BY PCR       Procedures:    Medications   aspirin (ASA) chewable tablet 243 mg (243 mg Oral Given 3/21/22 1108)   nitroGLYcerin (NITROSTAT) sublingual tablet 0.4 mg (0.4 mg Sublingual Given 3/21/22 1126)   0.9% sodium chloride BOLUS (1,000 mLs Intravenous New Bag 3/21/22 1112)   lidocaine (viscous) (XYLOCAINE) 2 % 15 mL, alum & mag hydroxide-simethicone (MAALOX) 15 mL GI Cocktail (30 mLs Oral Given 3/21/22 1151)   ketorolac (TORADOL) injection 15 mg (15 mg Intravenous Given 3/21/22 1335)       Impression & Plan      Medical Decision Making:  Dustin Avila is a 51 year old male presents emergency room today for evaluation of chest pain that he woke up with this morning.  See HPI.  His vitals are unremarkable.  He has a reassuring exam and is in no acute distress.  EKG unremarkable.  CBC, BMP, troponin, D-dimer, serial troponin, chest radiograph are unremarkable.  He remains clinically and vitally stable for about 4 hours.  I staffed the case with Dr. Ibrahim.  Patient reports some changes when he came moves in bed.  He also notes that his symptoms are similar to the last time he had cardiac related chest pain.  We will admit for further observation and work-up.  I talked with the hospitalist service who agreed to admit the patient for further cares.  Patient was in understanding of plan and has no further questions.  He was given the interventions above with little improvement.  I ordered Toradol at the time of this dictation for possible musculoskeletal pain.    Diagnosis:    ICD-10-CM    1. Chest pain, unspecified type  R07.9        Discharge Medications:  New Prescriptions    No medications on file         Scribe Disclosure:  Olaf SHEFFIELD, am serving as a scribe at 10:33 AM on  3/21/2022 to document services personally performed by Wesly Husain PA-C based on my observations and the provider's statements to me.      Wesly Husain PA-C  03/21/22 1427

## 2022-03-21 NOTE — PROGRESS NOTES
Medical Cannabis placed in black lock box by , lock box secured to patients bed frame. Patient to call security to self administer cannabis.

## 2022-03-21 NOTE — CONSULTS
[facility]    Cardiology Consultation     Date of Admission:  3/21/2022    Assessment & Plan      This is a pleasant 51 yr old patient of Dr. Kinney with follow medical history:     1.  CAD, NSTEMI in 2016 status post PIERRE to LAD, ramus intermedius, and OM1.  Known occluded continuation of RCA with PLB filling by left-to-right collaterals.  Patent stents with scattered, mild to moderate disease including the distal left main, RCA, LAD and circumflex per angiogram in 2019, performed for preop clearance.  2.  Low-normal ejection fraction of 50%, per 2019 TTE.  3.  Joshua-Parkinson-White, first described at [facility] in 2011, asymptomatic.  Followed by EP Dr. Toledo.  4.  Hypercholesterolemia.  Aversion to statin therapy.  5.  Ongoing tobacco abuse.  6.  Hemorrhoidal bleeding, status post herniorrhaphy.  7.  Chronic mild peripheral edema, probable venous insufficiency.    Admitted with chest pain and EKG without ischemic changes and TN negative x 2. Similar quality to prior event prompting his PCI. However in discussing with him the pain has been constant with radiation to the back so there are some atypical features. Preliminary view of echo imaging (final read pending) is that there may be a basal inferior wall motion abnormality.     Also consider PE/aortic disease on the differential given quality of the pain, or GERD. He reported spicy meal last night.    Recommendations:     1. Continue aspirin 81 mg daily  2. Continue statin  3. Echocardiogram pending  4. Admit for observation and obtain a nuclear stress test (can do treadmill nuc)  5. Continue metoprolol   6. If nuclear stress is negative then can likely discharge from cardiac standpoint. Discussed CT however patient has severe claustrophobia.     Lyssa Barnes MD    Code Status    Prior    Reason for Consult   Chest pain       Primary Care Physician   *Wesly Liz    Chief Complaint     Chest pain     History of Present  Illness     This is a pleasant 51 yr old patient of Dr. Kinney with follow medical history:     1.  CAD, NSTEMI in 2016 status post PIERRE to LAD, ramus intermedius, and OM1.  Known occluded continuation of RCA with PLB filling by left-to-right collaterals.  Patent stents with scattered, mild to moderate disease including the distal left main, RCA, LAD and circumflex per angiogram in 2019, performed for preop clearance.  2.  Low-normal ejection fraction of 50%, per 2019 TTE.  3.  Joshua-Parkinson-White, first described at [facility] in 2011, asymptomatic.  Followed by EP Dr. Toledo.  4.  Hypercholesterolemia.   5.  Ongoing tobacco abuse.  6.  Hemorrhoidal bleeding, status post herniorrhaphy.  7.  Chronic mild peripheral edema, probable venous insufficiency.     He recently saw Veena in clinic and was doing very well from a cardiac standpoint. Patient denied chest pain, shortness of breath, PND, orthopnea, edema, claudication, palpitations, near syncope or syncope.     However he came to ER with chest pain that started today at 7 am while sitting at his desk. It was pressure, radiating to the back and associated with diaphoresis. He took aspirin and SL nitroglycerin without improvement and this pain was thought to be similar to prior to when he had heart attack prompting his PCI. At time of consult echocardiogram pending, EKG with no ischemic changes. TN negative x 2.       Past Medical History   I have reviewed this patient's medical history and updated it with pertinent information if needed.   Past Medical History:   Diagnosis Date     Achilles tendonitis      Ankle edema      Arthritis      Coronary artery disease     cardiac cath 11/2019: no intervention; cath 2016:  + PIERRE to OM1, PIERRE to LAd, PIERRE to ramus     Hernia, abdominal      Hyperlipidemia      Incidental lung nodule      Osgood-Schlatter/osteochondroses     left     Parkinsons disease (H)      WPW (Joshua-Parkinson-White syndrome)        Past  Surgical History   I have reviewed this patient's surgical history and updated it with pertinent information if needed.  Past Surgical History:   Procedure Laterality Date     ARTHROSCOPY KNEE  5/8/2012    Procedure:ARTHROSCOPY KNEE; left knee arthroscopy, partial medial and lateral meniscectomy ; Surgeon:MARIA M THOMAS; Location: OR     CV HEART CATHETERIZATION WITH POSSIBLE INTERVENTION N/A 11/19/2019    Procedure: Heart Catheterization with Possible Intervention;  Surgeon: Heydi Bro MD;  Location:  HEART CARDIAC CATH LAB     HERNIA REPAIR       ORTHOPEDIC SURGERY      left leg achilles rupture     ORTHOPEDIC SURGERY      right hip replacemnent     PENIS SURGERY       SEPTOPLASTY       VASECTOMY         Prior to Admission Medications   Prior to Admission Medications   Prescriptions Last Dose Informant Patient Reported? Taking?   aspirin EC 81 MG EC tablet 3/21/2022 at Unknown time  No Yes   Sig: Take 1 tablet (81 mg) by mouth daily   medical cannabis (Patient's own supply)   Yes Yes   Sig: See Admin Instructions (The purpose of this order is to document that the patient reports taking medical cannabis.  This is not a prescription, and is not used to certify that the patient has a qualifying medical condition.)   metoprolol succinate ER (TOPROL-XL) 25 MG 24 hr tablet 3/21/2022 at Unknown time  No Yes   Sig: Take 1 tablet (25 mg) by mouth daily   nitroGLYcerin (NITROSTAT) 0.4 MG sublingual tablet PRN  No Yes   Sig: PLACE 1 TAB TO TONGUE EVERY 5 MINS AS NEEDED-CHEST PAIN. CALL 911 AFTER 3 DOSES IF SYMPTOMS PERSIST   rosuvastatin (CRESTOR) 40 MG tablet 3/21/2022 at Unknown time  No Yes   Sig: Take 1 tablet (40 mg) by mouth daily      Facility-Administered Medications: None     Allergies   Allergies   Allergen Reactions     Adhesive Tape Rash     dermabond  dermabond         Social History   I have reviewed this patient's social history and updated it with pertinent information if needed. Dustin OLVERA  [place]  reports that he quit smoking about 2 years ago. His smoking use included vaping device. He started smoking about 36 years ago. He has a 8.75 pack-year smoking history. He has never used smokeless tobacco. He reports previous alcohol use. He reports current drug use. Drug: Marijuana.    Family History   I have reviewed this patient's family history and updated it with pertinent information if needed.   Family History   Problem Relation Age of Onset     Diabetes Father        Review of Systems   The 10 point Review of Systems is negative other than noted in the HPI or here.     Physical Exam   Temp: 98  F (36.7  C) Temp src: Oral BP: 108/80 Pulse: 66   Resp: 11 SpO2: 91 %      Vital Signs with Ranges  Temp:  [98  F (36.7  C)] 98  F (36.7  C)  Pulse:  [61-73] 66  Resp:  [10-18] 11  BP: ()/(76-87) 108/80  SpO2:  [91 %-97 %] 91 %  200 lbs 0 oz    Constitutional: Awake, alert, cooperative, no apparent distress.  Eyes: Conjunctiva and pupils examined and normal.  Respiratory: Clear to auscultation bilaterally, no crackles or wheezing.  Cardiovascular: Regular rate and rhythm, normal S1 and S2, and no murmur noted.  GI: Soft, non-distended, non-tender, normal bowel sounds.  Skin: No rashes, no cyanosis, no edema.  Musculoskeletal: No joint swelling, erythema or tenderness.        Data   Results for orders placed or performed during the hospital encounter of 03/21/22 (from the past 24 hour(s))   EKG 12-lead, tracing only   Result Value Ref Range    Systolic Blood Pressure  mmHg    Diastolic Blood Pressure  mmHg    Ventricular Rate 68 BPM    Atrial Rate 68 BPM    NM Interval 116 ms    QRS Duration 88 ms     ms    QTc 397 ms    P Axis 54 degrees    R AXIS 55 degrees    T Axis 30 degrees    Interpretation ECG       Sinus rhythm  Normal ECG  When compared with ECG of 19-NOV-2019 08:48,  No significant change was found  Confirmed by GENERATED REPORT, COMPUTER ([id number]),  LIGIA MICHELLE ([id number]) on 3/21/2022  10:35:50 AM     CBC with platelets differential    Narrative    The following orders were created for panel order CBC with platelets differential.  Procedure                               Abnormality         Status                     ---------                               -----------         ------                     CBC with platelets and d...[[id number]]                      Final result                 Please view results for these tests on the individual orders.   Basic metabolic panel   Result Value Ref Range    Sodium 137 133 - 144 mmol/L    Potassium 4.0 3.4 - 5.3 mmol/L    Chloride 108 94 - 109 mmol/L    Carbon Dioxide (CO2) 23 20 - 32 mmol/L    Anion Gap 6 3 - 14 mmol/L    Urea Nitrogen 13 7 - 30 mg/dL    Creatinine 0.85 0.66 - 1.25 mg/dL    Calcium 8.9 8.5 - 10.1 mg/dL    Glucose 132 (H) 70 - 99 mg/dL    GFR Estimate >90 >60 mL/min/1.73m2   Troponin I   Result Value Ref Range    Troponin I High Sensitivity 4 <79 ng/L   CBC with platelets and differential   Result Value Ref Range    WBC Count 8.4 4.0 - 11.0 10e3/uL    RBC Count 4.69 4.40 - 5.90 10e6/uL    Hemoglobin 14.0 13.3 - 17.7 g/dL    Hematocrit 43.9 40.0 - 53.0 %    MCV 94 78 - 100 fL    MCH 29.9 26.5 - 33.0 pg    MCHC 31.9 31.5 - 36.5 g/dL    RDW 13.4 10.0 - 15.0 %    Platelet Count 270 150 - 450 10e3/uL    % Neutrophils 65 %    % Lymphocytes 22 %    % Monocytes 7 %    % Eosinophils 4 %    % Basophils 1 %    % Immature Granulocytes 1 %    NRBCs per 100 WBC 0 <1 /100    Absolute Neutrophils 5.6 1.6 - 8.3 10e3/uL    Absolute Lymphocytes 1.8 0.8 - 5.3 10e3/uL    Absolute Monocytes 0.6 0.0 - 1.3 10e3/uL    Absolute Eosinophils 0.3 0.0 - 0.7 10e3/uL    Absolute Basophils 0.1 0.0 - 0.2 10e3/uL    Absolute Immature Granulocytes 0.1 <=0.4 10e3/uL    Absolute NRBCs 0.0 10e3/uL   D dimer quantitative   Result Value Ref Range    D-Dimer Quantitative 0.32 0.00 - 0.50 ug/mL FEU    Narrative    This D-dimer assay is intended for use in conjunction with a  clinical pretest probability assessment model to exclude pulmonary embolism (PE) and deep venous thrombosis (DVT) in outpatients suspected of PE or DVT. The cut-off value is 0.50 ug/mL FEU.   Troponin I (now)   Result Value Ref Range    Troponin I High Sensitivity 4 <79 ng/L   Chest XR,  PA & LAT    Narrative    CHEST TWO VIEWS  3/21/2022 12:27 PM     HISTORY:  Chest pain.    COMPARISON: 1/18/2017.      Impression    IMPRESSION: Chest is negative and unchanged. Lungs clear. No pleural  effusions.    RUSSELL BASILIO MD         SYSTEM ID:  [id number]

## 2022-03-21 NOTE — PLAN OF CARE
PRIMARY DIAGNOSIS: CHEST PAIN  OUTPATIENT/OBSERVATION GOALS TO BE MET BEFORE DISCHARGE:  1. Ruled out acute coronary syndrome (negative or stable Troponin):  No  2. Pain Status: Improved with use of alternative comfort measures i.e.: rest and relaxation  3. Appropriate provocative testing performed: Yes  - Stress Test Procedure: Nuclear  - Interpretation of cardiac rhythm per telemetry tech: NSR    4. Cleared by Consultants (if applicable):No  5. Return to near baseline physical activity: Yes  Discharge Planner Nurse   Safe discharge environment identified: No  Barriers to discharge: No       Entered by: Lissa Irby 03/21/2022 6:49 PM  Please review provider order for any additional goals.   Nurse to notify provider when observation goals have been met and patient is ready for discharge.    Goal Outcome Evaluation:    Plan of Care Reviewed With: patient     Orientation/Cognitive: AxOx4  Observation Goals (Met/ Not Met): Not met  Mobility Level/Assist Equipment: Independent  Fall Risk (Y/N): No  Behavior Concerns: NA  Pain Management: Patient using own supply of medical cannabis.    Tele/VS/O2: Tele NSR, VSS on RA  ABNL Lab/BG: No  Diet: Low fat/ low sodium/ no caffiene diet  Bowel/Bladder: Continent  Skin Concerns: No  Drains/Devices: R PIV SL  Tests/Procedures for next shift: Nuclear stress test at 8 am tomorrow morning.   Anticipated DC date & active delays: 3/22/22  Patient Stated Goal for Today: NA

## 2022-03-21 NOTE — PROGRESS NOTES
RECEIVING UNIT ED HANDOFF REVIEW    ED Nurse Handoff Report was reviewed by: Lissa Irby RN on March 21, 2022 at 2:35 PM

## 2022-03-21 NOTE — ED PROVIDER NOTES
Emergency Department Attending Supervision Note  3/21/2022  4:54 PM      I evaluated this patient in conjunction with Taco Husain PA-C and agree with his evaluation, exam, diagnosis, and disposition.      Briefly, the patient presented with chest pain and midthoracic back pain.  He has a history of heart attack and coronary disease and he said this reminded him of the pain he had with his heart attack although he did not have the back pain then.      On my exam, patient has some mild tenderness around his mid thoracic spine between his shoulder blades.  His EKG does not show any acute ischemia.  2 troponins are both 4, or normal.  The patient has high risk profile with having had a coronary disease history and he said this reminded him of that pain.  I think he needs to come in overnight for further evaluation of this certainly could be musculoskeletal in a thoracic myofascial strain.  He will be admitted to the hospital service for further work-up as needed.      Diagnosis    ICD-10-CM    1. Chest pain, unspecified type  R07.9          MD Patrice Rosa Tracy Alan, MD  03/21/22 [id number]

## 2022-03-22 ENCOUNTER — APPOINTMENT (OUTPATIENT)
Dept: NUCLEAR MEDICINE | Facility: CLINIC | Age: 52
End: 2022-03-22
Attending: HOSPITALIST
Payer: COMMERCIAL

## 2022-03-22 VITALS
TEMPERATURE: 97.6 F | HEART RATE: 66 BPM | OXYGEN SATURATION: 95 % | WEIGHT: 200 LBS | SYSTOLIC BLOOD PRESSURE: 120 MMHG | HEIGHT: 68 IN | DIASTOLIC BLOOD PRESSURE: 81 MMHG | RESPIRATION RATE: 16 BRPM | BODY MASS INDEX: 30.31 KG/M2

## 2022-03-22 LAB
ANION GAP SERPL CALCULATED.3IONS-SCNC: 5 MMOL/L (ref 3–14)
BUN SERPL-MCNC: 15 MG/DL (ref 7–30)
CALCIUM SERPL-MCNC: 8.8 MG/DL (ref 8.5–10.1)
CHLORIDE BLD-SCNC: 110 MMOL/L (ref 94–109)
CO2 SERPL-SCNC: 25 MMOL/L (ref 20–32)
CREAT SERPL-MCNC: 0.87 MG/DL (ref 0.66–1.25)
CV BLOOD PRESSURE: 62 MMHG
CV STRESS MAX HR HE: 153
ERYTHROCYTE [DISTWIDTH] IN BLOOD BY AUTOMATED COUNT: 13.3 % (ref 10–15)
GFR SERPL CREATININE-BSD FRML MDRD: >90 ML/MIN/1.73M2
GLUCOSE BLD-MCNC: 104 MG/DL (ref 70–99)
HCT VFR BLD AUTO: 43.1 % (ref 40–53)
HGB BLD-MCNC: 13.9 G/DL (ref 13.3–17.7)
MAGNESIUM SERPL-MCNC: 2.4 MG/DL (ref 1.6–2.3)
MCH RBC QN AUTO: 30 PG (ref 26.5–33)
MCHC RBC AUTO-ENTMCNC: 32.3 G/DL (ref 31.5–36.5)
MCV RBC AUTO: 93 FL (ref 78–100)
NUC STRESS EJECTION FRACTION: 68 %
PHOSPHATE SERPL-MCNC: 3.7 MG/DL (ref 2.5–4.5)
PLATELET # BLD AUTO: 248 10E3/UL (ref 150–450)
POTASSIUM BLD-SCNC: 4.3 MMOL/L (ref 3.4–5.3)
RATE PRESSURE PRODUCT: NORMAL
RBC # BLD AUTO: 4.64 10E6/UL (ref 4.4–5.9)
SODIUM SERPL-SCNC: 140 MMOL/L (ref 133–144)
STRESS ECHO BASELINE DIASTOLIC HE: 80
STRESS ECHO BASELINE HR: 64 BPM
STRESS ECHO BASELINE SYSTOLIC BP: 112
STRESS ECHO CALCULATED PERCENT HR: 89 %
STRESS ECHO LAST STRESS DIASTOLIC BP: 84
STRESS ECHO LAST STRESS SYSTOLIC BP: 170
STRESS ECHO POST ESTIMATED WORKLOAD: 13.7 METS
STRESS ECHO POST EXERCISE DUR MIN: 11 MIN
STRESS ECHO POST EXERCISE DUR SEC: 29 SEC
STRESS ECHO TARGET HR: 171
WBC # BLD AUTO: 8.1 10E3/UL (ref 4–11)

## 2022-03-22 PROCEDURE — 85027 COMPLETE CBC AUTOMATED: CPT | Performed by: HOSPITALIST

## 2022-03-22 PROCEDURE — 84100 ASSAY OF PHOSPHORUS: CPT | Performed by: HOSPITALIST

## 2022-03-22 PROCEDURE — 78452 HT MUSCLE IMAGE SPECT MULT: CPT | Mod: 26 | Performed by: INTERNAL MEDICINE

## 2022-03-22 PROCEDURE — 343N000001 HC RX 343: Performed by: HOSPITALIST

## 2022-03-22 PROCEDURE — 80048 BASIC METABOLIC PNL TOTAL CA: CPT | Performed by: HOSPITALIST

## 2022-03-22 PROCEDURE — 36415 COLL VENOUS BLD VENIPUNCTURE: CPT | Performed by: HOSPITALIST

## 2022-03-22 PROCEDURE — 99213 OFFICE O/P EST LOW 20 MIN: CPT | Mod: 25 | Performed by: INTERNAL MEDICINE

## 2022-03-22 PROCEDURE — 250N000013 HC RX MED GY IP 250 OP 250 PS 637: Performed by: HOSPITALIST

## 2022-03-22 PROCEDURE — 93018 CV STRESS TEST I&R ONLY: CPT | Performed by: INTERNAL MEDICINE

## 2022-03-22 PROCEDURE — A9502 TC99M TETROFOSMIN: HCPCS | Performed by: HOSPITALIST

## 2022-03-22 PROCEDURE — 93016 CV STRESS TEST SUPVJ ONLY: CPT | Performed by: INTERNAL MEDICINE

## 2022-03-22 PROCEDURE — 83735 ASSAY OF MAGNESIUM: CPT | Performed by: HOSPITALIST

## 2022-03-22 PROCEDURE — 78452 HT MUSCLE IMAGE SPECT MULT: CPT

## 2022-03-22 PROCEDURE — G0378 HOSPITAL OBSERVATION PER HR: HCPCS

## 2022-03-22 PROCEDURE — 93017 CV STRESS TEST TRACING ONLY: CPT

## 2022-03-22 PROCEDURE — 99217 PR OBSERVATION CARE DISCHARGE: CPT | Performed by: INTERNAL MEDICINE

## 2022-03-22 RX ADMIN — ASPIRIN 81 MG: 81 TABLET, COATED ORAL at 07:46

## 2022-03-22 RX ADMIN — ROSUVASTATIN CALCIUM 40 MG: 20 TABLET, FILM COATED ORAL at 07:46

## 2022-03-22 RX ADMIN — TETROFOSMIN 30.8 MCI.: 1.38 INJECTION, POWDER, LYOPHILIZED, FOR SOLUTION INTRAVENOUS at 09:55

## 2022-03-22 RX ADMIN — TETROFOSMIN 11.1 MCI.: 1.38 INJECTION, POWDER, LYOPHILIZED, FOR SOLUTION INTRAVENOUS at 08:18

## 2022-03-22 RX ADMIN — ACETAMINOPHEN 650 MG: 325 TABLET, FILM COATED ORAL at 03:06

## 2022-03-22 NOTE — PROGRESS NOTES
PRIMARY DIAGNOSIS: CHEST PAIN  OUTPATIENT/OBSERVATION GOALS TO BE MET BEFORE DISCHARGE:  1. Ruled out acute coronary syndrome (negative or stable Troponin):  No  2. Pain Status: Improved with use of alternative comfort measures i.e.: rest and relaxation, medical cannabis  3. Appropriate provocative testing performed: Yes  - Stress Test Procedure: Nuclear  - Interpretation of cardiac rhythm per telemetry tech: NSR     4. Cleared by Consultants (if applicable):No  5. Return to near baseline physical activity: Yes  Discharge Planner Nurse   Safe discharge environment identified: No  Barriers to discharge: No       Entered by: Lissa Irby 03/21/2022 8:00 PM  Please review provider order for any additional goals.   Nurse to notify provider when observation goals have been met and patient is ready for discharge.

## 2022-03-22 NOTE — PROGRESS NOTES
3/21  [id number] - [id number]    Care Plan Summary Note: pt dx chest pain  Orientation: AO*4  Observation Goals (met & not met): not meet  Activity Level: independant  Fall Risk: yes  Behavior & Aggression Tool Color: green  Pain Management: denied pain  ABNL VS/O2: VSS, tele read NSR  ABNL Lab/BG: NA  Diet: NPO  Bowel/Bladder: continent  Drains/Devices: NA  Tests/Procedures for next shift: stress test today 3/22  Anticipated DC date: pending  Other Important Info: pt had home med made w jd that is locked in a box in pt bed site. Security had the key to open the box. Any time pt need the med, he will notify RN and RN should call security [phone] who will come and open the box.

## 2022-03-22 NOTE — DISCHARGE SUMMARY
[facility]    Discharge Summary  Hospitalist    Date of Admission:  3/21/2022  Date of Discharge:  3/22/2022  Discharging Provider: Genny Horton    Discharge Diagnoses   Atypical chest pain, ACS ruled out  CAD s/p PCI 2016 (PIERRE to LAD, ramus intermedius, OM1) with mild-mod multivessel disease 2019 angiogram  Hypercholesterolemia  Hypertension  Hx WPW 2011  Tobacco use   Medical marijuana  Osteoarthritis  Obesity    History of Present Illness   Dustin Avila is an 51 year old male with PMHx of hypertension, hyperlipidemia, CAD with hx of prior stenting, hx of WPW in 2011, tobacco use, OA, obesity and medical marijuana use who was admitted under observation status on 3/21/2022 for evaluation of atypical chest pain.     Hospital Course   Dustin Avila was admitted on 3/21/2022.  The following problems were addressed during his hospitalization:    Atypical chest pain, ACS ruled out  CAD s/p PCI 2016 (PIERRE to LAD, ramus intermedius, OM1) with mild-mod multivessel disease 2019 angiogram  Hypercholesterolemia  Hypertension  Hx WPW 2011  * Lexiscan stress test in 10/2019 that showed: medium sized partially reversible inferior and inferoseptal defect with mid-basal inferior scar with small-mod area of mild mark-infarct ischemia in distal inferior, mid-basal inferoseptal and basal inferolateral portion of LV. Mild ischemia seen in basal anterior portion of LV. Mild inferior hypokinesis.  * Home meds: ASA 81mg daily, rosuvastatin 40mg daily, metoprolol XL 25mg daily  * Chest pain started at 0700 on 3/21 while sitting at desk, did not resolve with nitroglycerin at home and he presented to ED.   * In ED, D-dimer 0.32. Trop x2 was 4. Pain unrelieved by nitroglycerin, ASA, GI cocktail in ED. Pain not reproducible with palpation. Minor increase in discomfort with deep breaths. Had spicy meal he night prior but no relief with GI cocktail. No recent strenuous upper body activity/exercise. EKG:  sinus rhythm. CXR: no effusion, infiltrate or pulm edema.  * Echo obtained 3/21, showed EF 50-55%, no regional WMA. RV normal. Mildly dilated aortic root. No significant valvular pathology  * Serial trops neg.   * Seen by cardiology -- underwent stress test on 3/22 which was neg for inducible ischemia.   * Meds continued without changes at discharge  * Will follow up with cardiology in clinic.      Tobacco use   * Stopped smoking cigarettes recently but now vapes  * Encouraged ongoing cessation     Medical marijuana  Osteoarthritis  * Continue medical marijuana for osteoarthritis     Obesity  * BMI 30.4 this stay.   * Reported he is planning to start exercise plan, but has not yet started. Completed various surgeries in the past 2 years which have improved his joint pain which had been limiting prior.    Genny Horton DO    Code Status   Full Code       Primary Care Physician   Wesly Liz    Physical Exam   Temp: 97.6  F (36.4  C) Temp src: Oral BP: 120/81 Pulse: 66   Resp: 16 SpO2: 95 % O2 Device: None (Room air)    Vitals:    03/21/22 0944 03/21/22 1615   Weight: 90.7 kg (200 lb) 90.7 kg (200 lb)     Vital Signs with Ranges  Temp:  [97.4  F (36.3  C)-98.1  F (36.7  C)] 97.6  F (36.4  C)  Pulse:  [52-68] 66  Resp:  [13-20] 16  BP: (110-134)/(79-93) 120/81  SpO2:  [94 %-98 %] 95 %  No intake/output data recorded.    General: Resting comfortably, alert, conversive, NAD  CVS: HRRR, no MGR, no LE edema  Respiratory: CTAB, no wheeze/rales/rhonchi, no increased work of breathing  GI: S, NT, ND, +BS  Skin: Warm/dry  Neuro: CNd 2-12 intact, no focal motor/sensory deficits    Discharge Disposition   Discharged to home  Condition at discharge: Stable    Consultations This Hospital Stay   CARDIOLOGY IP CONSULT    Time Spent on this Encounter   IGenny DO, personally saw the patient today and spent greater than 30 minutes discharging this patient.    Discharge Orders      Follow-Up with  Cardiology      Reason for your hospital stay    Evaluation of your chest pain. You underwent evaluation with a stress test this stay, which was negative for signs of heart disease. You will continue taking medications as prescribed and follow up with cardiology in clinic.     Follow-up and recommended labs and tests     Follow up with your PCP in 1-2 weeks  Follow up with [facility] in clinic as advised.     Activity    Your activity upon discharge: activity as tolerated     Diet    Follow this diet upon discharge: Regular     Discharge Medications   Current Discharge Medication List      CONTINUE these medications which have NOT CHANGED    Details   aspirin EC 81 MG EC tablet Take 1 tablet (81 mg) by mouth daily  Qty: 90 tablet, Refills: 3    Associated Diagnoses: NSTEMI (non-ST elevated myocardial infarction) (H)      medical cannabis (Patient's own supply) See Admin Instructions (The purpose of this order is to document that the patient reports taking medical cannabis.  This is not a prescription, and is not used to certify that the patient has a qualifying medical condition.)      metoprolol succinate ER (TOPROL-XL) 25 MG 24 hr tablet Take 1 tablet (25 mg) by mouth daily  Qty: 90 tablet, Refills: 3    Associated Diagnoses: Coronary artery disease involving native coronary artery of native heart without angina pectoris      nitroGLYcerin (NITROSTAT) 0.4 MG sublingual tablet PLACE 1 TAB TO TONGUE EVERY 5 MINS AS NEEDED-CHEST PAIN. CALL 911 AFTER 3 DOSES IF SYMPTOMS PERSIST  Qty: 25 tablet, Refills: 3    Associated Diagnoses: Coronary artery disease involving native coronary artery of native heart without angina pectoris      rosuvastatin (CRESTOR) 40 MG tablet Take 1 tablet (40 mg) by mouth daily  Qty: 90 tablet, Refills: 3    Associated Diagnoses: Coronary artery disease involving native coronary artery of native heart without angina pectoris           Allergies   Allergies   Allergen Reactions     Adhesive  Tape Rash     dermabond  dermabond       Data   Most Recent 3 CBC's:Recent Labs   Lab Test 22  0625 22  1018 20  0735   WBC 8.1 8.4 8.6   HGB 13.9 14.0 14.1   MCV 93 94 94    270 349      Most Recent 3 BMP's:  Recent Labs   Lab Test 22  0625 22  1018 02/15/22  0931    137 138   POTASSIUM 4.3 4.0 4.2   CHLORIDE 110* 108 107   CO2 25 23 26   BUN 15 13 17   CR 0.87 0.85 0.82   ANIONGAP 5 6 5   GABRIELA 8.8 8.9 8.8   * 132* 105*     Most Recent 2 LFT's:  Recent Labs   Lab Test 03/15/22  0911 02/15/22  0931 20  1651 19  0845 19  0942   AST  --   --  17  --  18   ALT 31 29 33   < > 32   ALKPHOS  --   --  97  --  76   BILITOTAL  --   --  0.5  --  0.3    < > = values in this interval not displayed.     Most Recent INR's and Anticoagulation Dosing History:  Anticoagulation Dose History     Recent Dosing and Labs Latest Ref Rng & Units 2010 2010 2010 2010 2019 2020 3/9/2020    INR 0.86 - 1.14 1.73(H) 1.51(H) 1.43(H) 1.55(H) 1.01 0.91 0.96        Most Recent Cholesterol Panel:  Recent Labs   Lab Test 03/15/22  0911   CHOL 108   LDL 51   HDL 46   TRIG 54     Most Recent TSH, T4 and A1c Labs:  Recent Labs   Lab Test 22  1018   A1C 5.2     Results for orders placed or performed during the hospital encounter of 22   Chest XR,  PA & LAT    Narrative    CHEST TWO VIEWS  3/21/2022 12:27 PM     HISTORY:  Chest pain.    COMPARISON: 2017.      Impression    IMPRESSION: Chest is negative and unchanged. Lungs clear. No pleural  effusions.    RUSSELL BASILIO MD         SYSTEM ID:  [id number]   Echocardiogram Complete     Value    LVEF  50-55%    Narrative    [id number]  [id number]  [id number]  [id number]^EMELINA^ARI^DOMO     [facility]  Echocardiography Laboratory  [address] [place] [address]     Name: TAMMI WEBSTER  MRN: [id number]  : 1970  Study Date: 2022 02:37 PM  Age: 51  yrs  Gender: Male  Patient Location: [facility]  Reason For Study: Chest Pain  Ordering Physician: ARI TARANGO  Referring Physician: Wesly Liz  Performed By: Eufemia Frazier     BSA: 2.0 m2  Height: 68 in  Weight: 200 lb  HR: 55  BP: 110/82 mmHg  ______________________________________________________________________________  Procedure  Complete Portable Echo Adult.  ______________________________________________________________________________  Interpretation Summary     1. Left ventricular systolic function is low normal. The visual ejection  fraction is 50-55%.  2. No regional wall motion abnormalities noted.  3. The right ventricle is normal in structure, function and size.  4. No evidence for significant valvular pathology.  5. Mildly dilated aortic root (sinus of valsalva) 3.8 cm.  ______________________________________________________________________________  Left Ventricle  The left ventricle is mildly dilated. There is normal left ventricular wall  thickness. The visual ejection fraction is 50-55%. Left ventricular systolic  function is low normal. Left ventricular diastolic function is normal. No  regional wall motion abnormalities noted.     Right Ventricle  The right ventricle is normal in structure, function and size.     Atria  Normal left atrial size. Right atrial size is normal. There is no color  Doppler evidence of an atrial shunt.     Mitral Valve  The mitral valve is normal in structure and function. There is trace mitral  regurgitation.     Tricuspid Valve  The tricuspid valve is normal in structure and function. There is trace  tricuspid regurgitation.     Aortic Valve  The aortic valve is normal in structure and function.     Pulmonic Valve  The pulmonic valve is normal in structure and function.     Vessels  Mild aortic root dilatation. IVC diameter <2.1 cm collapsing >50% with sniff  suggests a normal RA pressure of 3 mmHg.     Pericardium  There is no pericardial effusion.      Rhythm  Sinus rhythm was noted.  ______________________________________________________________________________  MMode/2D Measurements & Calculations  IVSd: 0.99 cm     LVIDd: 5.8 cm  LVIDs: 4.2 cm  LVPWd: 0.88 cm  FS: 27.9 %  LV mass(C)d: 217.0 grams  LV mass(C)dI: 106.2 grams/m2  Ao root diam: 3.8 cm  LA dimension: 3.7 cm  asc Aorta Diam: 3.6 cm  LA/Ao: 0.98  LVOT diam: 2.4 cm  LVOT area: 4.5 cm2  LA Volume (BP): 64.2 ml  LA Volume Index (BP): 31.5 ml/m2  RWT: 0.30     Doppler Measurements & Calculations  MV E max saba: 74.0 cm/sec  MV A max saba: 42.2 cm/sec  MV E/A: 1.8  MV dec slope: 284.2 cm/sec2  MV dec time: 0.26 sec  PA acc time: 0.17 sec  E/E' av.4  Lateral E/e': 5.8  Medial E/e': 7.1     ______________________________________________________________________________  Report approved by: Olaf Guerrero 2022 03:53 PM         NM MPI Treadmill (NUC CARD)     Value    Target     Baseline Systolic     Baseline Diastolic BP 80    Last Stress Systolic     Last Stress Diastolic BP 84    Baseline HR 64    Max HR  153    Max Predicted HR  89    Exercise duration (min) 11    Exercise duration (sec) 29    Estimated workload 13.7    Rate Pressure Product 26,010.0    BP 62    Left Ventricular EF 68    Narrative       The nuclear stress test is negative for inducible myocardial ischemia   or infarction.     There is a small area of a mild degree of infarction in the basal   inferior segment(s) of the left ventricle.     Left ventricular function is normal.     The left ventricular ejection fraction at rest is 62%.  The left   ventricular ejection fraction at stress is 68%.     A prior study was conducted on 10/31/2019.  This study has no change   when compared with the prior study.

## 2022-03-22 NOTE — PROGRESS NOTES
Security came to patients room to unlock lock box and give patients  own medical cannabis supply back to pt before discharge. RN witnessed pt put own medical cannabis supply in backpack.

## 2022-03-22 NOTE — PROGRESS NOTES
MD Notification    Notified Person: MD    Notified Person Name: Rose Mary Horton     Notification Date/Time: 3/22/22 @ 11:00 am     Notification Interaction: Page    Purpose of Notification: 5310 RW. Pt is back from Nuclear stress test, okay to now eat?     Orders Received: Diet orders recieved    Comments:

## 2022-03-22 NOTE — PROGRESS NOTES
[facility]    Cardiology Progress Note     Assessment & Plan     This is a pleasant 51 yr old patient of Dr. Kinney with follow medical history:     1.  CAD, NSTEMI in 2016 status post PIERRE to LAD, ramus intermedius, and OM1.  Known occluded continuation of RCA with PLB filling by left-to-right collaterals.  Patent stents with scattered, mild to moderate disease including the distal left main, RCA, LAD and circumflex per angiogram in 2019, performed for preop clearance.  2.  Low-normal ejection fraction of 50%, per 2019 TTE.  3.  Joshua-Parkinson-White, first described at [facility] in 2011, asymptomatic.  Followed by EP Dr. Toledo.  4.  Hypercholesterolemia.  Aversion to statin therapy.  5.  Ongoing tobacco abuse.  6.  Hemorrhoidal bleeding, status post herniorrhaphy.  7.  Chronic mild peripheral edema, probable venous insufficiency.     Admitted with chest pain and EKG without ischemic changes and TN negative. Similar quality to prior event prompting his PCI. However in discussing with him the pain has been constant with radiation to the back so there are some atypical features. Echocardiogram was revealing for basal inferior WMA (on my read, formal read was wnl). Nuclear stress negative for ischemia, just small area of basal inferior infarct.      Recommendations:     Can discharge patient on aspirin and statin therapy, with consideration of other etiologies for chest and back pain. Will plan to have follow up in one month with RADHA in cardiology. No indication for coronary angiogram.     Will sign off. Please page with questions.      Lyssa Barnes MD  Text Page  (Monday - Friday, 8 am- 5 pm)    Interval History     No acute events overnight     Physical Exam   Temp: 97.6  F (36.4  C) Temp src: Oral BP: 120/81 Pulse: 66   Resp: 16 SpO2: 95 % O2 Device: None (Room air)    Vitals:    03/21/22 0944 03/21/22 1615   Weight: 90.7 kg (200 lb) 90.7 kg (200 lb)     Vital Signs with  Ranges  Temp:  [97.4  F (36.3  C)-98.1  F (36.7  C)] 97.6  F (36.4  C)  Pulse:  [52-68] 66  Resp:  [13-20] 16  BP: (110-134)/(79-93) 120/81  SpO2:  [94 %-98 %] 95 %  No intake/output data recorded.  Patient Active Problem List   Diagnosis     Hyperlipidemia LDL goal <70     Anomalous atrioventricular excitation     Short CO-normal QRS complex syndrome     Coronary artery disease involving native coronary artery of native heart without angina pectoris     Hyperlipidemia     JOHN (generalized anxiety disorder)     Anxiety     Old myocardial infarction     Peripheral edema     Venous (peripheral) insufficiency     Tobacco abuse disorder     Class 1 obesity due to excess calories without serious comorbidity with body mass index (BMI) of 30.0 to 30.9 in adult     Chest pain, unspecified type       Constitutional: Awake, alert, cooperative, no apparent distress  Respiratory: Clear to auscultation bilaterally, no crackles or wheezing  Cardiovascular: Regular rate and rhythm, normal S1 and S2, and no murmur noted  GI: Normal bowel sounds, soft, non-distended, non-tender  Skin/Integumen: No rashes, no cyanosis, no edema  Other:      Medications       aspirin  81 mg Oral Daily     medical cannabis  1 Dose Other See Admin Instructions     metoprolol succinate ER  25 mg Oral Daily     rosuvastatin  40 mg Oral Daily     sodium chloride (PF)  10 mL Intravenous Once       Data   Results for orders placed or performed during the hospital encounter of 03/21/22 (from the past 24 hour(s))   Troponin I (now)   Result Value Ref Range    Troponin I High Sensitivity 4 <79 ng/L   Nt probnp inpatient   Result Value Ref Range    N terminal Pro BNP Inpatient 72 0 - 450 pg/mL   Chest XR,  PA & LAT    Narrative    CHEST TWO VIEWS  3/21/2022 12:27 PM     HISTORY:  Chest pain.    COMPARISON: 1/18/2017.      Impression    IMPRESSION: Chest is negative and unchanged. Lungs clear. No pleural  effusions.    RUSSELL BASILIO MD         SYSTEM ID:   [id number]   Asymptomatic COVID-19 Virus (Coronavirus) by PCR Nasopharyngeal    Specimen: Nasopharyngeal; Swab   Result Value Ref Range    SARS CoV2 PCR Negative Negative    Narrative    Testing was performed using the [employer] Xpress SARS-CoV-2 Assay on the   Cepheid Gene-Xpert Instrument Systems. Additional information about   this Emergency Use Authorization (EUA) assay can be found via the Lab   Guide. This test should be ordered for the detection of SARS-CoV-2 in   individuals who meet SARS-CoV-2 clinical and/or epidemiological   criteria. Test performance is unknown in asymptomatic patients. This   test is for in vitro diagnostic use under the FDA EUA for   laboratories certified under CLIA to perform high complexity testing.   This test has not been FDA cleared or approved. A negative result   does not rule out the presence of PCR inhibitors in the specimen or   target RNA in concentration below the limit of detection for the   assay. The possibility of a false negative should be considered if   the patient's recent exposure or clinical presentation suggests   COVID-19. This test was validated by the [facility]. This laboratory is certified under the Clinical Laboratory Improvement Amendments of 1988 (CLIA-88) as qualified to perform high complexity laboratory testing.     Echocardiogram Complete   Result Value Ref Range    LVEF  50-55%     Narrative    [id number]  [id number]  [id number]  [id number]^EMELINA^ARI^DOMO     [facility]  Echocardiography Laboratory  [address] [place] [id number]     Name: TAMMI WEBSTER  MRN: [id number]  : 1970  Study Date: 2022 02:37 PM  Age: 51 yrs  Gender: Male  Patient Location: [facility]  Reason For Study: Chest Pain  Ordering Physician: ARI TARANGO  Referring Physician: Wesly Liz  Performed By: Eufemia Frazier     BSA: 2.0 m2  Height: 68 in  Weight: 200 lb  HR: 55  BP: 110/82  mmHg  ______________________________________________________________________________  Procedure  Complete Portable Echo Adult.  ______________________________________________________________________________  Interpretation Summary     1. Left ventricular systolic function is low normal. The visual ejection  fraction is 50-55%.  2. No regional wall motion abnormalities noted.  3. The right ventricle is normal in structure, function and size.  4. No evidence for significant valvular pathology.  5. Mildly dilated aortic root (sinus of valsalva) 3.8 cm.  ______________________________________________________________________________  Left Ventricle  The left ventricle is mildly dilated. There is normal left ventricular wall  thickness. The visual ejection fraction is 50-55%. Left ventricular systolic  function is low normal. Left ventricular diastolic function is normal. No  regional wall motion abnormalities noted.     Right Ventricle  The right ventricle is normal in structure, function and size.     Atria  Normal left atrial size. Right atrial size is normal. There is no color  Doppler evidence of an atrial shunt.     Mitral Valve  The mitral valve is normal in structure and function. There is trace mitral  regurgitation.     Tricuspid Valve  The tricuspid valve is normal in structure and function. There is trace  tricuspid regurgitation.     Aortic Valve  The aortic valve is normal in structure and function.     Pulmonic Valve  The pulmonic valve is normal in structure and function.     Vessels  Mild aortic root dilatation. IVC diameter <2.1 cm collapsing >50% with sniff  suggests a normal RA pressure of 3 mmHg.     Pericardium  There is no pericardial effusion.     Rhythm  Sinus rhythm was noted.  ______________________________________________________________________________  MMode/2D Measurements & Calculations  IVSd: 0.99 cm     LVIDd: 5.8 cm  LVIDs: 4.2 cm  LVPWd: 0.88 cm  FS: 27.9 %  LV mass(C)d: 217.0 grams  LV  mass(C)dI: 106.2 grams/m2  Ao root diam: 3.8 cm  LA dimension: 3.7 cm  asc Aorta Diam: 3.6 cm  LA/Ao: 0.98  LVOT diam: 2.4 cm  LVOT area: 4.5 cm2  LA Volume (BP): 64.2 ml  LA Volume Index (BP): 31.5 ml/m2  RWT: 0.30     Doppler Measurements & Calculations  MV E max saba: 74.0 cm/sec  MV A max saba: 42.2 cm/sec  MV E/A: 1.8  MV dec slope: 284.2 cm/sec2  MV dec time: 0.26 sec  PA acc time: 0.17 sec  E/E' av.4  Lateral E/e': 5.8  Medial E/e': 7.1     ______________________________________________________________________________  Report approved by: Olaf Guerrero 2022 03:53 PM         Troponin I - now then in 2 hours x 2   Result Value Ref Range    Troponin I High Sensitivity 5 <79 ng/L   Troponin I - now then in 2 hours x 2   Result Value Ref Range    Troponin I High Sensitivity 6 <79 ng/L   Basic metabolic panel   Result Value Ref Range    Sodium 140 133 - 144 mmol/L    Potassium 4.3 3.4 - 5.3 mmol/L    Chloride 110 (H) 94 - 109 mmol/L    Carbon Dioxide (CO2) 25 20 - 32 mmol/L    Anion Gap 5 3 - 14 mmol/L    Urea Nitrogen 15 7 - 30 mg/dL    Creatinine 0.87 0.66 - 1.25 mg/dL    Calcium 8.8 8.5 - 10.1 mg/dL    Glucose 104 (H) 70 - 99 mg/dL    GFR Estimate >90 >60 mL/min/1.73m2   CBC with platelets   Result Value Ref Range    WBC Count 8.1 4.0 - 11.0 10e3/uL    RBC Count 4.64 4.40 - 5.90 10e6/uL    Hemoglobin 13.9 13.3 - 17.7 g/dL    Hematocrit 43.1 40.0 - 53.0 %    MCV 93 78 - 100 fL    MCH 30.0 26.5 - 33.0 pg    MCHC 32.3 31.5 - 36.5 g/dL    RDW 13.3 10.0 - 15.0 %    Platelet Count 248 150 - 450 10e3/uL   Magnesium   Result Value Ref Range    Magnesium 2.4 (H) 1.6 - 2.3 mg/dL   Phosphorus   Result Value Ref Range    Phosphorus 3.7 2.5 - 4.5 mg/dL   NM MPI Treadmill (NUC CARD)   Result Value Ref Range    Target      Baseline Systolic      Baseline Diastolic BP 80     Last Stress Systolic      Last Stress Diastolic BP 84     Baseline HR 64 bpm    Max HR  153     Max Predicted HR  89 %     Exercise duration (min) 11 min    Exercise duration (sec) 29 sec    Estimated workload 13.7 METS    Rate Pressure Product 26,010.0     BP 62 mmHg    Left Ventricular EF 68 %    Narrative       The nuclear stress test is negative for inducible myocardial ischemia   or infarction.     There is a small area of a mild degree of infarction in the basal   inferior segment(s) of the left ventricle.     Left ventricular function is normal.     The left ventricular ejection fraction at rest is 62%.  The left   ventricular ejection fraction at stress is 68%.     A prior study was conducted on 10/31/2019.  This study has no change   when compared with the prior study.

## 2022-03-22 NOTE — PROGRESS NOTES
MD Notification    Notified Person: MD    Notified Person Name: Rose Mary Horton    Notification Date/Time: 3/22/22 @ 12:43 pm    Notification Interaction: Page    Purpose of Notification: 0472 RG. Cardiology signed off, will continue on daily aspirin and statin and f/u with cards in 1 month. Orders for discharge? Thank you.     Orders Received: Orders for discharge received    Comments:

## 2022-03-23 ENCOUNTER — OFFICE VISIT (OUTPATIENT)
Dept: FAMILY MEDICINE | Facility: CLINIC | Age: 52
End: 2022-03-23
Payer: COMMERCIAL

## 2022-03-23 ENCOUNTER — TELEPHONE (OUTPATIENT)
Dept: CARDIOLOGY | Facility: CLINIC | Age: 52
End: 2022-03-23

## 2022-03-23 ENCOUNTER — PATIENT OUTREACH (OUTPATIENT)
Dept: FAMILY MEDICINE | Facility: CLINIC | Age: 52
End: 2022-03-23

## 2022-03-23 VITALS
TEMPERATURE: 98.2 F | OXYGEN SATURATION: 98 % | DIASTOLIC BLOOD PRESSURE: 80 MMHG | SYSTOLIC BLOOD PRESSURE: 130 MMHG | RESPIRATION RATE: 16 BRPM | BODY MASS INDEX: 30.26 KG/M2 | WEIGHT: 199 LBS | HEART RATE: 80 BPM

## 2022-03-23 DIAGNOSIS — I25.10 CORONARY ARTERY DISEASE INVOLVING NATIVE CORONARY ARTERY OF NATIVE HEART WITHOUT ANGINA PECTORIS: ICD-10-CM

## 2022-03-23 DIAGNOSIS — R07.89 ATYPICAL CHEST PAIN: ICD-10-CM

## 2022-03-23 DIAGNOSIS — Z12.11 SCREEN FOR COLON CANCER: ICD-10-CM

## 2022-03-23 DIAGNOSIS — Z09 HOSPITAL DISCHARGE FOLLOW-UP: Primary | ICD-10-CM

## 2022-03-23 PROCEDURE — 99214 OFFICE O/P EST MOD 30 MIN: CPT | Performed by: FAMILY MEDICINE

## 2022-03-23 NOTE — TELEPHONE ENCOUNTER
What type of discharge? Observation  Risk of Hospital admission or ED visit: 50%  Is a TCM episode required? Yes  When should the patient follow up with PCP? 7 days of discharge.    Senait Lewis RN  [facility]

## 2022-03-23 NOTE — TELEPHONE ENCOUNTER
Patient was evaluated by cardiology while inpatient for atypical chest pain with negative troponin's. PMH: hypertension, hyperlipidemia, CAD s/p PCI 2016 (PIERRE to LAD, ramus intermedius, OM1) with mild-mod multivessel disease 2019 angiogram, hx of WPW in 2011, tobacco use, OA, obesity and medical marijuana. Echo showed EF 50-55%, no regional WMA. RV normal. Mildly dilated aortic root. No significant valvular pathology. 3/22/22: stress test was negative for inducible ischemia. No changes in PTA medications. Called patient to discuss any post hospital d/c questions he may have and confirm f/u appts. Patient denied any SOB, chest pain, or light headedness. RN confirmed with patient that he needs to be scheduled for a f/u cardiology RADHA and cardiologist appt's as ordered-central scheduling phone number provided. Patient advised to call clinic with any cardiac related questions or concerns prior to this radha't. Patient verbalized understanding and agreed with plan. ANITHA Sethi RN.

## 2022-03-23 NOTE — TELEPHONE ENCOUNTER
Just completed hospital discharge f/u visit today with Dr. Cohen, see encounter from 3/23/22.   Alla SANTOS RN  [facility]

## 2022-03-23 NOTE — PROGRESS NOTES
"  Assessment & Plan       ICD-10-CM    1. Hospital discharge follow-up  Z09    2. Atypical chest pain  R07.89    3. Coronary artery disease involving native coronary artery of native heart without angina pectoris  I25.10    4. Screen for colon cancer  Z12.11 Adult Gastro Ref - Procedure Only     Patient was recently admitted to the hospital for evaluation of atypical chest pain.  Acute coronary syndrome was ruled out.  Patient does have history of coronary artery disease.  He is appropriately managed with statins and metoprolol.  He is taking baby aspirin daily.  His symptoms completely resolved.  No heartburn reported.  He met with the cardiologist prior to this episode.  He is instructed to reach out back to them about any follow-up recommendations.  No labs indicated today.    Recommending to get a screening colonoscopy done.         BMI:   Estimated body mass index is 30.26 kg/m  as calculated from the following:    Height as of 3/21/22: 1.727 m (5' 8\").    Weight as of this encounter: 90.3 kg (199 lb).       Chaz Cohen MD  [facility] [place] Marie is a 51 year old who presents for the following health issues     HPI       Hospital Follow-up Visit:    Hospital/Nursing Home/IP Rehab Facility: [facility]  Date of Admission: 03/21/2022  Date of Discharge: 03/22/2022  Reason(s) for Admission: Chest pain      Was your hospitalization related to COVID-19? No   Problems taking medications regularly:  None  Medication changes since discharge: None  Problems adhering to non-medication therapy:  None    Summary of hospitalization:  [facility] discharge summary reviewed  Diagnostic Tests/Treatments reviewed.  Follow up needed: cardiology  Other Healthcare Providers Involved in Patient s Care:         None  Update since discharge: improved.       Post Discharge Medication Reconciliation: discharge medications reconciled, continue medications " without change.  Plan of care communicated with patient                  Review of Systems   CONSTITUTIONAL: NEGATIVE for fever, chills, change in weight  ENT/MOUTH: NEGATIVE for ear, mouth and throat problems  RESP: NEGATIVE for significant cough or SOB  GI: NEGATIVE for nausea, abdominal pain, heartburn, or change in bowel habits      Objective    /80 (BP Location: Left arm, Patient Position: Chair, Cuff Size: Adult Regular)   Pulse 80   Temp 98.2  F (36.8  C) (Oral)   Resp 16   Wt 90.3 kg (199 lb)   SpO2 98%   BMI 30.26 kg/m    Body mass index is 30.26 kg/m .  Physical Exam   GENERAL: healthy, alert and no distress  NECK: no adenopathy, no asymmetry, masses, or scars and thyroid normal to palpation  RESP: lungs clear to auscultation - no rales, rhonchi or wheezes  CV: regular rate and rhythm, normal S1 S2, no S3 or S4, no murmur, click or rub, no peripheral edema and peripheral pulses strong  ABDOMEN: soft, nontender, no hepatosplenomegaly, no masses and bowel sounds normal  MS: no gross musculoskeletal defects noted, no edema

## 2022-03-23 NOTE — PLAN OF CARE
PRIMARY DIAGNOSIS: CHEST PAIN  OUTPATIENT/OBSERVATION GOALS TO BE MET BEFORE DISCHARGE:  1. Ruled out acute coronary syndrome (negative or stable Troponin):  Yes  2. Pain Status: No pain  3. Appropriate provocative testing performed: Yes  - Stress Test Procedure: Nuclear stress test today  - Interpretation of cardiac rhythm per telemetry tech: NSR    4. Cleared by Consultants (if applicable): Yes  5. Return to near baseline physical activity: Yes  Discharge Planner Nurse   Safe discharge environment identified: Yes  Barriers to discharge: No       Entered by: Lissa Irby 03/22/2022 1:15 PM  Please review provider order for any additional goals.   Nurse to notify provider when observation goals have been met and patient is ready for discharge.    Goal Outcome Evaluation:    Plan of Care Reviewed With: patient     Orientation/Cognitive: AxOx4  Observation Goals (Met/ Not Met): Met, adequate for discharge  Mobility Level/Assist Equipment: Independent  Fall Risk (Y/N): No  Behavior Concerns: NA  Pain Management: Patient using own supply of medical cannabis, denies chest pain  Tele/VS/O2: Tele NSR, VSS on RA  ABNL Lab/BG: No  Diet: Low fat/ low sodium  Bowel/Bladder: Continent  Skin Concerns: No  Drains/Devices: R PIV SL  Tests/Procedures for next shift: NA  Anticipated DC date & active delays: Today  Patient Stated Goal for Today: NA   Patient discharged off the unit with all belongings, PIV out. Pt own supply of medical cannabis given back to patient before discharge. Will f/u with cardiology outpatient.

## 2022-03-23 NOTE — Clinical Note
"Perez Carter.  I saw this patient for hospital discharge follow-up visit.  Aside from that he wanted me to put in a \"retroreferral\" for last years visit to [facility] urgent care.  His insurance will not cover that unless PCP puts in this referral and date it for last year.   He tells that the [facility] urgent care doctors billed him for clinic visit instead of urgent care visit which is costing him a lot of money.    I told him that I have never heard about this.  I also talked to Jesus who suggested to message you to see if it is something we could do or not.    Chaz Cohen MD  [facility], [place]           "

## 2022-03-23 NOTE — PLAN OF CARE
Goal Outcome Evaluation:    Plan of Care Reviewed With: patient     Pt AxOx4, VSS on RA. Independent in room

## 2022-03-24 ENCOUNTER — TELEPHONE (OUTPATIENT)
Dept: GASTROENTEROLOGY | Facility: CLINIC | Age: 52
End: 2022-03-24
Payer: COMMERCIAL

## 2022-03-24 DIAGNOSIS — Z11.59 ENCOUNTER FOR SCREENING FOR OTHER VIRAL DISEASES: Primary | ICD-10-CM

## 2022-03-24 NOTE — TELEPHONE ENCOUNTER
Screening Questions  BlueKIND OF PREP RedLOCATION [review exclusion criteria] GreenSEDATION TYPE  1. Have you had a positive covid test in the last 90 days? N     2. Are you active on mychart? Y    3. What insurance is in the chart? BCBS     3.   Ordering/Referring Provider: Chaz Cohen MD    4. BMI 30.4 [BMI OVER 40-EXTENDED PREP]  If greater than 40 review exclusion criteria [PAC APPT IF @ [facility]]        5.  Respiratory Screening :  [If yes to any of the following HOSPITAL setting only]     Do you use daily home oxygen? N    Do you have mod to severe Obstructive Sleep Apnea? N  [OKAY @ [facility] SH PH RI]   Do you have Pulmonary Hypertension? N     Do you have UNCONTROLLED asthma? N        6.   Have you had a heart or lung transplant? N      7.   Are you currently on dialysis? N [ If yes, G-PREP & HOSPITAL setting only]     8.   Do you have chronic kidney disease? N [ If yes, G-PREP ]    9.   Have you had a stroke or Transient ischemic attack (TIA - aka  mini stroke ) within 6 months?  N (If yes, please review exclusion criteria)    10.   In the past 6 months, have you had any heart related issues including cardiomyopathy or heart attack? N           If yes, did it require cardiac stenting or other implantable device?       11.   Do you have any implantable devices in your body (pacemaker, defib, LVAD)? N (If yes, please review exclusion criteria)    12.   Do you take nitroglycerin? N           If yes, how often?   (if yes, HOSPITAL setting ONLY)    13.   Are you currently taking any blood thinners? N           [IF YES, INFORM PATIENT TO FOLLOW UP W/ ORDERING PROVIDER FOR BRIDGING INSTRUCTIONS]     14.   Do you have a diagnosis of diabetes? N   [ If yes, G-PREP ]    15.   [FEMALES] Are you currently pregnant?     If yes, how many weeks?     16.   Are you taking any prescription pain medications on a routine schedule?  N  [ If yes, EXTENDED PREP.] [If yes, MAC]    17.   Do you have any chemical dependencies  such as alcohol, street drugs, or methadone?  N [If yes, MAC]    18.   Do you have any history of post-traumatic stress syndrome, severe anxiety or history of psychosis?  N  [If yes, MAC]    19.   Do you have a significant intellectual disability?  N [If yes, MAC]    20.   Do you transfer independently?  Y    21.  On a regular basis do you go 3-5 days between bowel movements? N   [ If yes, EXTENDED PREP.]    22.   Preferred LOCAL Pharmacy for Pre Prescription   [id number] [place], [place] - [address]      Scheduling Details      Caller :   (Please ask for phone number if not scheduled by patient)    Type of Procedure Scheduled: COLON  Which Colonoscopy Prep was Sent?: MPREP  AVINASH CF PATIENTS & GROEN'S PATIENTS NEEDS EXTENDED PREP  Surgeon: MEMO  Date of Procedure: 4/20  Location:       Sedation Type: CS  Conscious Sedation- Needs  for 6 hours after the procedure  MAC/General-Needs  for 24 hours after procedure    Pre-op Required at [facility], [place], [place] and OR for MAC sedation: N  (advise patient they will need a pre-op prior to procedure -)      Informed patient they will need an adult  Y  Cannot take any type of public or medical transportation alone    Pre-Procedure Covid test to be completed at [facility] or Externally: 4/16    Confirmed Nurse will call to complete assessment Y    Additional comments:

## 2022-04-16 ENCOUNTER — LAB (OUTPATIENT)
Dept: URGENT CARE | Facility: URGENT CARE | Age: 52
End: 2022-04-16
Payer: COMMERCIAL

## 2022-04-16 DIAGNOSIS — Z11.59 ENCOUNTER FOR SCREENING FOR OTHER VIRAL DISEASES: ICD-10-CM

## 2022-04-16 PROCEDURE — U0003 INFECTIOUS AGENT DETECTION BY NUCLEIC ACID (DNA OR RNA); SEVERE ACUTE RESPIRATORY SYNDROME CORONAVIRUS 2 (SARS-COV-2) (CORONAVIRUS DISEASE [COVID-19]), AMPLIFIED PROBE TECHNIQUE, MAKING USE OF HIGH THROUGHPUT TECHNOLOGIES AS DESCRIBED BY CMS-2020-01-R: HCPCS

## 2022-04-16 PROCEDURE — U0005 INFEC AGEN DETEC AMPLI PROBE: HCPCS

## 2022-04-17 LAB — SARS-COV-2 RNA RESP QL NAA+PROBE: NEGATIVE

## 2022-04-20 ENCOUNTER — HOSPITAL ENCOUNTER (OUTPATIENT)
Facility: CLINIC | Age: 52
Discharge: HOME | End: 2022-04-20
Attending: COLON & RECTAL SURGERY | Admitting: COLON & RECTAL SURGERY
Payer: COMMERCIAL

## 2022-04-20 VITALS
OXYGEN SATURATION: 93 % | SYSTOLIC BLOOD PRESSURE: 113 MMHG | HEART RATE: 64 BPM | BODY MASS INDEX: 30.16 KG/M2 | DIASTOLIC BLOOD PRESSURE: 78 MMHG | HEIGHT: 68 IN | RESPIRATION RATE: 52 BRPM | WEIGHT: 199 LBS

## 2022-04-20 LAB — COLONOSCOPY: NORMAL

## 2022-04-20 PROCEDURE — G0500 MOD SEDAT ENDO SERVICE >5YRS: HCPCS | Performed by: COLON & RECTAL SURGERY

## 2022-04-20 PROCEDURE — 250N000011 HC RX IP 250 OP 636: Performed by: COLON & RECTAL SURGERY

## 2022-04-20 PROCEDURE — 45385 COLONOSCOPY W/LESION REMOVAL: CPT | Performed by: COLON & RECTAL SURGERY

## 2022-04-20 PROCEDURE — 88305 TISSUE EXAM BY PATHOLOGIST: CPT | Mod: TC | Performed by: COLON & RECTAL SURGERY

## 2022-04-20 RX ORDER — ONDANSETRON 2 MG/ML
4 INJECTION INTRAMUSCULAR; INTRAVENOUS
Status: DISCONTINUED | OUTPATIENT
Start: 2022-04-20 | End: 2022-04-20 | Stop reason: HOSPADM

## 2022-04-20 RX ORDER — FENTANYL CITRATE 50 UG/ML
INJECTION, SOLUTION INTRAMUSCULAR; INTRAVENOUS PRN
Status: COMPLETED | OUTPATIENT
Start: 2022-04-20 | End: 2022-04-20

## 2022-04-20 RX ORDER — LIDOCAINE 40 MG/G
CREAM TOPICAL
Status: DISCONTINUED | OUTPATIENT
Start: 2022-04-20 | End: 2022-04-20 | Stop reason: HOSPADM

## 2022-04-20 RX ADMIN — MIDAZOLAM 1 MG: 1 INJECTION INTRAMUSCULAR; INTRAVENOUS at 09:31

## 2022-04-20 RX ADMIN — FENTANYL CITRATE 100 MCG: 50 INJECTION, SOLUTION INTRAMUSCULAR; INTRAVENOUS at 09:28

## 2022-04-20 RX ADMIN — MIDAZOLAM 2 MG: 1 INJECTION INTRAMUSCULAR; INTRAVENOUS at 09:29

## 2022-04-20 NOTE — H&P
Colon & Rectal Surgery History and Physical  Pre-Endoscopy Procedure Note    History of Present Illness   I have been asked by Dr. Cohen to evaluate this 51 year old male for colorectal cancer screening. He currently denies any abdominal pain, weight loss, bleeding per rectum, or recent change in bowel habits.    Past Medical History  Diagnosis Date     Achilles tendonitis      Ankle edema      Arthritis      Coronary artery disease      Hernia, abdominal      Hyperlipidemia      Incidental lung nodule      Osgood-Schlatter/osteochondroses     left     Parkinsons disease       WPW (Joshua-Parkinson-White syndrome)        Past Surgical History  Procedure Laterality Date     ARTHROSCOPY KNEE  5/8/2012    Procedure:ARTHROSCOPY KNEE; left knee arthroscopy, partial medial and lateral meniscectomy ; Surgeon:MARIA M THOMAS; Location: OR     CV HEART CATHETERIZATION WITH POSSIBLE INTERVENTION N/A 11/19/2019    Procedure: Heart Catheterization with Possible Intervention;  Surgeon: Heydi Bro MD;  Location:  HEART CARDIAC CATH LAB     HERNIA REPAIR       ORTHOPEDIC SURGERY      left leg achilles rupture     ORTHOPEDIC SURGERY      right hip replacemnent     PENIS SURGERY       SEPTOPLASTY       VASECTOMY          Medications  Medication Sig     aspirin EC 81 MG EC tablet Take 1 tablet (81 mg) by mouth daily     metoprolol succinate ER (TOPROL-XL) 25 MG 24 hr tablet Take 1 tablet (25 mg) by mouth daily     nitroGLYcerin (NITROSTAT) 0.4 MG sublingual tablet PLACE 1 TAB TO TONGUE EVERY 5 MINS AS NEEDED-CHEST PAIN. CALL 911 AFTER 3 DOSES IF SYMPTOMS PERSIST     rosuvastatin (CRESTOR) 40 MG tablet Take 1 tablet (40 mg) by mouth daily       Allergies  Allergen Reactions     Adhesive Tape Rash     dermabond        Family History   Family history includes Diabetes in his father.     Social History   He reports that he quit smoking about 2 years ago. His smoking use included vaping device. He started smoking about 36  "years ago. He has a 8.75 pack-year smoking history. He has never used smokeless tobacco. He reports previous alcohol use. He reports current drug use. Drug: Marijuana.    Review of Systems   Constitutional:  No fever, weight change or fatigue.    Eyes:     No dry eyes or vision changes.   Ears/Nose/Throat/Neck:  No oral ulcers, sore throat or voice change.    Cardiovascular:   No palpitations, syncope, angina or edema.   Respiratory:    No chest pain, excessive sleepiness, shortness of breath or hemoptysis.    Gastrointestinal:   No abdominal pain, nausea, vomiting, diarrhea or heartburn.    Genitourinary:   No dysuria, hematuria, urinary retention or urinary frequency.   Musculoskeletal:  No joint swelling or arthralgias.    Dermatologic:  No skin rash or other skin changes.   Neurologic:    No focal weakness or numbness. No neuropathy.   Psychiatric:    No depression, anxiety, suicidal ideation, or paranoid ideation.   Endocrine:   No cold or heat intolerance, polydipsia, hirsutism, change in libido, or flushing.   Hematology/Lymphatic:  No bleeding or lymphadenopathy.    Allergy/Immunology:  No rhinitis or hives.     Physical Exam   Vitals:  Blood pressure 106/78, pulse 64, resp. rate 16, height 1.727 m (5' 8\"), weight 90.3 kg (199 lb), SpO2 99 %.    General:  Alert and oriented to person, place and time   Airway: Normal oropharyngeal airway and neck mobility   Lungs:  Clear bilaterally   Heart:  Regular rate and rhythm   Abdomen: Soft, NT, ND, no masses   Extremities: Warm, good capillary refill    ASA Grade: II (mild systemic disease)    Impression: Cleared for use of conscious sedation for colorectal cancer screening    Plan: Proceed with colonoscopy     Radha Ortiz MD  [facility]  [phone]  "

## 2022-04-21 LAB
PATH REPORT.COMMENTS IMP SPEC: NORMAL
PATH REPORT.COMMENTS IMP SPEC: NORMAL
PATH REPORT.FINAL DX SPEC: NORMAL
PATH REPORT.GROSS SPEC: NORMAL
PATH REPORT.MICROSCOPIC SPEC OTHER STN: NORMAL
PATH REPORT.RELEVANT HX SPEC: NORMAL
PHOTO IMAGE: NORMAL

## 2022-04-21 PROCEDURE — 88305 TISSUE EXAM BY PATHOLOGIST: CPT | Mod: 26 | Performed by: PATHOLOGY

## 2022-10-14 ENCOUNTER — TRANSFERRED RECORDS (OUTPATIENT)
Dept: HEALTH INFORMATION MANAGEMENT | Facility: CLINIC | Age: 52
End: 2022-10-14

## 2022-10-23 ENCOUNTER — HEALTH MAINTENANCE LETTER (OUTPATIENT)
Age: 52
End: 2022-10-23

## 2023-03-07 DIAGNOSIS — I25.10 CORONARY ARTERY DISEASE INVOLVING NATIVE CORONARY ARTERY OF NATIVE HEART WITHOUT ANGINA PECTORIS: ICD-10-CM

## 2023-03-07 RX ORDER — ROSUVASTATIN CALCIUM 40 MG/1
40 TABLET, COATED ORAL DAILY
Qty: 90 TABLET | Refills: 0 | Status: SHIPPED | OUTPATIENT
Start: 2023-03-07 | End: 2023-07-25

## 2023-03-30 DIAGNOSIS — I25.10 CORONARY ARTERY DISEASE INVOLVING NATIVE CORONARY ARTERY OF NATIVE HEART WITHOUT ANGINA PECTORIS: ICD-10-CM

## 2023-03-30 RX ORDER — METOPROLOL SUCCINATE 25 MG/1
25 TABLET, EXTENDED RELEASE ORAL DAILY
Qty: 90 TABLET | Refills: 0 | Status: SHIPPED | OUTPATIENT
Start: 2023-03-30 | End: 2023-07-25

## 2023-03-30 RX ORDER — NITROGLYCERIN 0.4 MG/1
TABLET SUBLINGUAL
Qty: 25 TABLET | Refills: 0 | Status: SHIPPED | OUTPATIENT
Start: 2023-03-30 | End: 2023-06-01

## 2023-03-30 NOTE — TELEPHONE ENCOUNTER
[facility] Cardiology Refill Guideline reviewed.  Medications meet criteria for refill. Patient is due for an appointment. No appointment is scheduled. Refill letter sent.

## 2023-04-02 ENCOUNTER — HEALTH MAINTENANCE LETTER (OUTPATIENT)
Age: 53
End: 2023-04-02

## 2023-04-14 ENCOUNTER — VIRTUAL VISIT (OUTPATIENT)
Dept: UROLOGY | Facility: CLINIC | Age: 53
End: 2023-04-14
Payer: COMMERCIAL

## 2023-04-14 VITALS — BODY MASS INDEX: 30.92 KG/M2 | WEIGHT: 204 LBS | HEIGHT: 68 IN

## 2023-04-14 DIAGNOSIS — R35.0 URINARY FREQUENCY: Primary | ICD-10-CM

## 2023-04-14 DIAGNOSIS — R39.13 SPLIT OF URINARY STREAM: ICD-10-CM

## 2023-04-14 DIAGNOSIS — R35.1 NOCTURIA: ICD-10-CM

## 2023-04-14 PROCEDURE — 99204 OFFICE O/P NEW MOD 45 MIN: CPT | Mod: VID | Performed by: PHYSICIAN ASSISTANT

## 2023-04-14 ASSESSMENT — PAIN SCALES - GENERAL: PAINLEVEL: NO PAIN (0)

## 2023-04-14 NOTE — LETTER
"4/14/2023       RE: Dustin Avila  [address]  [place] [address]     Dear Colleague,    Thank you for referring your patient, Dustin Avila, to the [facility] [place] [facility]. Please see a copy of my visit note below.    Send link to cell phone    Cynthia is a 50 year old who is being evaluated via a billable video visit.      How would you like to obtain your AVS? MyChart  If the video visit is dropped, the invitation should be resent by: Text to cell phone: [phone]  Will anyone else be joining your video visit? No      Video Start Time: 10:01AM  Video-Visit Details    Type of service:  Video Visit    Video End Time:10:10AM    Originating Location (pt. Location): Home    Distant Location (provider location):  [facility] [place]     Platform used for Video Visit: VISENZE     CC: frequency     HPI:  Dustin \"Cynthia\" Austin is a pleasant 52 year old male who presents via billable video visit for the above. Last seen in 2021 with similar symptoms (slower stream, post void dribbling, nocturia x4, 2nd void if he waits long enough). Was given a trial of Flomax, although did not take it (prefers to not take meds).    No dysuria or gross hematuria.      PSA 2020 2.26. No family hx of prostate cancer.     Recalls having the opening of his urethra \"cut open\" as a 7 year old.     Now, nocturia q 2 hours, split stream, slower stream, but no leakage.      Past Medical History        Past Medical History:   Diagnosis Date    Achilles tendonitis      Ankle edema      Arthritis      Coronary artery disease       cardiac cath 11/2019: no intervention; cath 2016:  + PIERRE to OM1, PIERRE to LAd, PIERRE to ramus    Hernia, abdominal      Hyperlipidemia      Incidental lung nodule      Osgood-Schlatter/osteochondroses       left    Parkinsons disease (H)      WPW (Joshua-Parkinson-White syndrome)              Past Surgical History       "   Past Surgical History:   Procedure Laterality Date    ARTHROSCOPY KNEE   2012     Procedure:ARTHROSCOPY KNEE; left knee arthroscopy, partial medial and lateral meniscectomy ; Surgeon:MARIA M THOMAS; Location: OR    CV HEART CATHETERIZATION WITH POSSIBLE INTERVENTION N/A 2019     Procedure: Heart Catheterization with Possible Intervention;  Surgeon: Hyedi Bro MD;  Location:  HEART CARDIAC CATH LAB    HERNIA REPAIR        ORTHOPEDIC SURGERY         left leg achilles rupture    ORTHOPEDIC SURGERY         right hip replacemnent    PENIS SURGERY        SEPTOPLASTY        VASECTOMY                Social History   Social History            Socioeconomic History    Marital status:        Spouse name: seperated    Number of children: 2    Years of education: Not on file    Highest education level: Not on file   Occupational History    Occupation: self    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   Social Needs    Financial resource strain: Not on file    Food insecurity       Worry: Not on file       Inability: Not on file    Transportation needs       Medical: Not on file       Non-medical: Not on file   Tobacco Use    Smoking status: Current Every Day Smoker       Packs/day: 0.25       Years: 35.00       Pack years: 8.75       Types: Vaping Device       Start date:        Last attempt to quit: 2019       Years since quittin.1    Smokeless tobacco: Never Used   Substance and Sexual Activity    Alcohol use: Not Currently       Alcohol/week: 0.0 standard drinks       Comment: once weekly    Drug use: Yes       Types: Marijuana    Sexual activity: Yes       Partners: Female   Lifestyle    Physical activity       Days per week: Not on file       Minutes per session: Not on file    Stress: Not on file   Relationships    Social connections       Talks on phone: Not on file       Gets together: Not on file       Attends Christian service: Not on file       Active member of club or organization: Not on  file       Attends meetings of clubs or organizations: Not on file       Relationship status: Not on file    Intimate partner violence       Fear of current or ex partner: Not on file       Emotionally abused: Not on file       Physically abused: Not on file       Forced sexual activity: Not on file   Other Topics Concern    Parent/sibling w/ CABG, MI or angioplasty before 65F 55M? No     Service Not Asked    Blood Transfusions Not Asked    Caffeine Concern No    Occupational Exposure Not Asked    Hobby Hazards Not Asked    Sleep Concern Yes    Stress Concern Yes    Weight Concern Yes       Comment: weight loss    Special Diet Not Asked    Back Care Not Asked    Exercise Yes       Comment: cardiac rehab 3 days week    Bike Helmet Not Asked    Seat Belt Not Asked    Self-Exams Not Asked   Social History Narrative    Not on file            Family History         Family History   Problem Relation Age of Onset    Diabetes Father                 Allergies   Allergen Reactions    Mecrylate Itching and Rash    Adhesive Tape Rash     dermabond  dermabond                Current Outpatient Medications   Medication    aspirin EC 81 MG EC tablet    atorvastatin (LIPITOR) 40 MG tablet    celecoxib (CELEBREX) 200 MG capsule    medical cannabis (Patient's own supply)    metoprolol succinate ER (TOPROL-XL) 25 MG 24 hr tablet    oxyCODONE-acetaminophen (PERCOCET) 5-325 MG tablet    cyclobenzaprine (FLEXERIL) 10 MG tablet    nitroGLYcerin (NITROSTAT) 0.4 MG sublingual tablet    spacer (OPTICHAMBER KAY) holding chamber      No current facility-administered medications for this visit.             PEx:   PSYCH: NAD, pleasant  EYES: EOMI       A/P: Dustin Avila is a 52 year old male with w/ LUTS  -Hoping to avoid meds all together and we discussed this would require a cysto to start.Will arrange with next available.    -Consider PSA screening.      Ericka Mcdowell PA-C  [facility]     26 minutes spent by me on the date  of the encounter doing chart review, review of test results, interpretation of tests, patient visit and documentation

## 2023-04-14 NOTE — PATIENT INSTRUCTIONS
CYSTOSCOPY    What is a Cystoscopy?  This is a procedure done to check for problems inside the bladder.  Problems may include polyps (growths), tumors, inflammation (swelling and redness) and other concerns.    The Urologist inserts a thin tube (called a cystoscope) into the bladder.  The tube is about the size of a pencil.  We will give you numbing medicine to reduce the pain or discomfort you may feel.    The Urologist will be able to see inside the bladder by filling the bladder with water.  The water makes it easier to see any problems that may be present. You will have a sense to need to urinate and this is normal.       How should I get ready for the exam?  Nothing to do to prepare. You may eat normally the day of the exam. There is no sedation, so you may drive yourself to and from if you can drive.       Please tell your doctor if:  You have a history of urinary tract infections.  You know that you have a tumor in your bladder.  You have bleeding problems.  You have any allergies.  You are or may be pregnant.      What happens after the exam?  You may go back to your normal diet and activity as you feel ready.    For the next two days after the exam, you may notice:  Some blood in your urine.  Some burning when you urinate (use the toilet).  An urge to urinate more often.  Bladder spasms.    These are normal after the procedure. They should go away on their own after a day or two.      You can help to relieve the above listed symptoms by:  Drinking 6 to 8 large glasses of water each day (includes drinks at meals).  This will help clear the urine.  Take warm baths to relieve pain and bladder spasms.  Do not add anything to the bath water.  You may take Tylenol (acetaminophen) per label instructions for discomfort.

## 2023-04-14 NOTE — PROGRESS NOTES
"Send link to cell phone    Cynthia is a 50 year old who is being evaluated via a billable video visit.      How would you like to obtain your AVS? [employer]  If the video visit is dropped, the invitation should be resent by: Text to cell phone: [phone]  Will anyone else be joining your video visit? No      Video Start Time: 10:01AM  Video-Visit Details    Type of service:  Video Visit    Video End Time:10:10AM    Originating Location (pt. Location): Home    Distant Location (provider location):  [facility]     Platform used for Video Visit: Huoshi     CC: frequency     HPI:  Dsutin \"Cynthia\" Austin is a pleasant 52 year old male who presents via billable video visit for the above. Last seen in 2021 with similar symptoms (slower stream, post void dribbling, nocturia x4, 2nd void if he waits long enough). Was given a trial of Flomax, although did not take it (prefers to not take meds).    No dysuria or gross hematuria.      PSA 2020 2.26. No family hx of prostate cancer.     Recalls having the opening of his urethra \"cut open\" as a 7 year old.     Now, nocturia q 2 hours, split stream, slower stream, but no leakage.      Past Medical History        Past Medical History:   Diagnosis Date     Achilles tendonitis       Ankle edema       Arthritis       Coronary artery disease       cardiac cath 11/2019: no intervention; cath 2016:  + PIERRE to OM1, PIERRE to LAd, PIERRE to ramus     Hernia, abdominal       Hyperlipidemia       Incidental lung nodule       Osgood-Schlatter/osteochondroses       left     Parkinsons disease (H)       WPW (Joshua-Parkinson-White syndrome)              Past Surgical History         Past Surgical History:   Procedure Laterality Date     ARTHROSCOPY KNEE   5/8/2012     Procedure:ARTHROSCOPY KNEE; left knee arthroscopy, partial medial and lateral meniscectomy ; Surgeon:MARIA M THOMAS; Location:[facility] OR     CV HEART CATHETERIZATION WITH POSSIBLE INTERVENTION N/A 11/19/2019     " Procedure: Heart Catheterization with Possible Intervention;  Surgeon: Heydi Bro MD;  Location:  HEART CARDIAC CATH LAB     HERNIA REPAIR         ORTHOPEDIC SURGERY         left leg achilles rupture     ORTHOPEDIC SURGERY         right hip replacemnent     PENIS SURGERY         SEPTOPLASTY         VASECTOMY                Social History   Social History            Socioeconomic History     Marital status:        Spouse name: seperated     Number of children: 2     Years of education: Not on file     Highest education level: Not on file   Occupational History     Occupation: self     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   Social Needs     Financial resource strain: Not on file     Food insecurity       Worry: Not on file       Inability: Not on file     Transportation needs       Medical: Not on file       Non-medical: Not on file   Tobacco Use     Smoking status: Current Every Day Smoker       Packs/day: 0.25       Years: 35.00       Pack years: 8.75       Types: Vaping Device       Start date:        Last attempt to quit: 2019       Years since quittin.1     Smokeless tobacco: Never Used   Substance and Sexual Activity     Alcohol use: Not Currently       Alcohol/week: 0.0 standard drinks       Comment: once weekly     Drug use: Yes       Types: Marijuana     Sexual activity: Yes       Partners: Female   Lifestyle     Physical activity       Days per week: Not on file       Minutes per session: Not on file     Stress: Not on file   Relationships     Social connections       Talks on phone: Not on file       Gets together: Not on file       Attends Tenriism service: Not on file       Active member of club or organization: Not on file       Attends meetings of clubs or organizations: Not on file       Relationship status: Not on file     Intimate partner violence       Fear of current or ex partner: Not on file       Emotionally abused: Not on file       Physically abused: Not on file       Forced  sexual activity: Not on file   Other Topics Concern     Parent/sibling w/ CABG, MI or angioplasty before 65F 55M? No      Service Not Asked     Blood Transfusions Not Asked     Caffeine Concern No     Occupational Exposure Not Asked     Hobby Hazards Not Asked     Sleep Concern Yes     Stress Concern Yes     Weight Concern Yes       Comment: weight loss     Special Diet Not Asked     Back Care Not Asked     Exercise Yes       Comment: cardiac rehab 3 days week     Bike Helmet Not Asked     Seat Belt Not Asked     Self-Exams Not Asked   Social History Narrative     Not on file            Family History         Family History   Problem Relation Age of Onset     Diabetes Father                 Allergies   Allergen Reactions     Mecrylate Itching and Rash     Adhesive Tape Rash     dermabond  dermabond                Current Outpatient Medications   Medication     aspirin EC 81 MG EC tablet     atorvastatin (LIPITOR) 40 MG tablet     celecoxib (CELEBREX) 200 MG capsule     medical cannabis (Patient's own supply)     metoprolol succinate ER (TOPROL-XL) 25 MG 24 hr tablet     oxyCODONE-acetaminophen (PERCOCET) 5-325 MG tablet     cyclobenzaprine (FLEXERIL) 10 MG tablet     nitroGLYcerin (NITROSTAT) 0.4 MG sublingual tablet     spacer (OPTICHAMBER KAY) holding chamber      No current facility-administered medications for this visit.             PEx:   PSYCH: NAD, pleasant  EYES: EOMI       A/P: Dustin Avila is a 52 year old male with w/ LUTS  -Hoping to avoid meds all together and we discussed this would require a cysto to start.Will arrange with next available.    -Consider PSA screening.      Ericka Mcdowell PA-C  [facility]     26 minutes spent by me on the date of the encounter doing chart review, review of test results, interpretation of tests, patient visit and documentation

## 2023-06-01 ENCOUNTER — TELEPHONE (OUTPATIENT)
Dept: CARDIOLOGY | Facility: CLINIC | Age: 53
End: 2023-06-01
Payer: COMMERCIAL

## 2023-06-01 DIAGNOSIS — I25.10 CORONARY ARTERY DISEASE INVOLVING NATIVE CORONARY ARTERY OF NATIVE HEART WITHOUT ANGINA PECTORIS: ICD-10-CM

## 2023-06-01 RX ORDER — NITROGLYCERIN 0.4 MG/1
TABLET SUBLINGUAL
Qty: 25 TABLET | Refills: 0 | Status: SHIPPED | OUTPATIENT
Start: 2023-06-01 | End: 2023-11-21

## 2023-06-01 RX ORDER — NITROGLYCERIN 0.4 MG/1
TABLET SUBLINGUAL
Qty: 25 TABLET | Refills: 0 | Status: CANCELLED | OUTPATIENT
Start: 2023-06-01

## 2023-06-01 NOTE — TELEPHONE ENCOUNTER
Spoke with Patient who states he did receive a Nitroglycerine refill in March but  washed them.  Patient denies symptoms of chest discomfort, shortness of breath or dizziness/light headedness.   Would like a refill just to have in case.   And aware of how to use them if needed.   Prescription e scribed.     Patient will call scheduling for next available Dr. Kinney OV. Or RADHA OV.     Patient agrees with plan.

## 2023-06-01 NOTE — TELEPHONE ENCOUNTER
Message left to return call.   Is Patient having symptoms and using Nitrolglycerine SL or is it for prevenative measures?    Last refill was 3/2023.     Patient also overdue to Cardiology Annual OV.     Requested Renewals     nitroGLYcerin (NITROSTAT) 0.4 MG sublingual tablet         Sig: PLACE 1 TAB TO TONGUE EVERY 5 MINS AS NEEDED-CHEST PAIN. CALL 911 AFTER 3 DOSES IF SYMPTOMS PERSIST    Disp:  25 tablet    Refills:  0    Start: 6/1/2023    Class: E-Prescribe    For: Coronary artery disease involving native coronary artery of native heart without angina pectoris    Last ordered: 2 months ago by Veena Gibson PA-C         Last Nitroglycerine refill 3-30-23.     Last Veena WELSH RADHA OV 3-13-22   Plan:  - Congratulated on smoking cessation, encouraged to stop vaping when able.  -Continue Crestor.  -Continue cardiovascular exercise, monitor for the development of anginal symptoms, and alert us in this case.     Follow-up:  -With Dr. Kinney in 6 months.     Nitroglycerin 0.4 MG PLACE 1 TAB TO TONGUE EVERY 5 MINS AS NEEDED-CHEST PAIN. CALL 911 AFTER 3 DOSES IF SYMPTOMS PERSIST

## 2023-06-01 NOTE — TELEPHONE ENCOUNTER
[facility] Cardiology Refill Guideline reviewed.  Medication does not meet criteria for refill due to patient is due for an appointment. Patient was last seen 3/15/22. A refill letter was sent on 3/30/23.  Messaged to providers team for further review.

## 2023-06-13 ENCOUNTER — OFFICE VISIT (OUTPATIENT)
Dept: UROLOGY | Facility: CLINIC | Age: 53
End: 2023-06-13
Payer: COMMERCIAL

## 2023-06-13 VITALS
SYSTOLIC BLOOD PRESSURE: 130 MMHG | HEART RATE: 88 BPM | BODY MASS INDEX: 31.07 KG/M2 | HEIGHT: 68 IN | WEIGHT: 205 LBS | DIASTOLIC BLOOD PRESSURE: 80 MMHG | OXYGEN SATURATION: 97 %

## 2023-06-13 DIAGNOSIS — Z79.2 PROPHYLACTIC ANTIBIOTIC: ICD-10-CM

## 2023-06-13 DIAGNOSIS — R35.0 URINARY FREQUENCY: Primary | ICD-10-CM

## 2023-06-13 DIAGNOSIS — R35.1 NOCTURIA: ICD-10-CM

## 2023-06-13 DIAGNOSIS — R31.29 MICROSCOPIC HEMATURIA: ICD-10-CM

## 2023-06-13 LAB
ALBUMIN UR-MCNC: NEGATIVE MG/DL
APPEARANCE UR: CLEAR
BILIRUB UR QL STRIP: NEGATIVE
COLOR UR AUTO: YELLOW
GLUCOSE UR STRIP-MCNC: NEGATIVE MG/DL
HGB UR QL STRIP: ABNORMAL
HYALINE CASTS: 1 /LPF
KETONES UR STRIP-MCNC: NEGATIVE MG/DL
LEUKOCYTE ESTERASE UR QL STRIP: NEGATIVE
NITRATE UR QL: NEGATIVE
PH UR STRIP: 5.5 [PH] (ref 5–7)
PSA SERPL-MCNC: 2.1 UG/L (ref 0–4)
RBC URINE: 4 /HPF
RESIDUAL VOLUME (RV) (EXTERNAL): 16
SP GR UR STRIP: 1.02 (ref 1–1.03)
SQUAMOUS EPITHELIAL: <1 /HPF
UROBILINOGEN UR STRIP-ACNC: 0.2 E.U./DL
WBC URINE: <1 /HPF

## 2023-06-13 PROCEDURE — 99214 OFFICE O/P EST MOD 30 MIN: CPT | Mod: 25 | Performed by: UROLOGY

## 2023-06-13 PROCEDURE — 84153 ASSAY OF PSA TOTAL: CPT | Performed by: UROLOGY

## 2023-06-13 PROCEDURE — 36415 COLL VENOUS BLD VENIPUNCTURE: CPT | Performed by: UROLOGY

## 2023-06-13 PROCEDURE — 81001 URINALYSIS AUTO W/SCOPE: CPT | Performed by: UROLOGY

## 2023-06-13 PROCEDURE — 51798 US URINE CAPACITY MEASURE: CPT | Performed by: UROLOGY

## 2023-06-13 RX ORDER — SULFAMETHOXAZOLE/TRIMETHOPRIM 800-160 MG
1 TABLET ORAL ONCE
Qty: 1 TABLET | Refills: 0 | Status: SHIPPED | OUTPATIENT
Start: 2023-06-13 | End: 2023-06-13

## 2023-06-13 RX ORDER — LIDOCAINE HYDROCHLORIDE 20 MG/ML
JELLY TOPICAL ONCE
Status: DISCONTINUED | OUTPATIENT
Start: 2023-06-13 | End: 2023-06-13 | Stop reason: HOSPADM

## 2023-06-13 RX ORDER — CIPROFLOXACIN 500 MG/1
500 TABLET, FILM COATED ORAL ONCE
Qty: 1 TABLET | Refills: 0 | Status: SHIPPED | OUTPATIENT
Start: 2023-06-13 | End: 2023-06-13

## 2023-06-13 ASSESSMENT — PAIN SCALES - GENERAL: PAINLEVEL: NO PAIN (0)

## 2023-06-13 NOTE — NURSING NOTE
Chief Complaint   Patient presents with     Lower urinary symptoms     In office cystoscopy   Prior to the start of the procedure and with procedural staff participation, I verbally confirmed the patient s identity using two indicators, relevant allergies, that the procedure was appropriate and matched the consent or emergent situation, and that the correct equipment/implants were available. Immediately prior to starting the procedure I conducted the Time Out with the procedural staff and re-confirmed the patient s name, procedure, and site/side. I have wiped the patient off with the povidone-Iodine solution, draped them,  used Lidocaine hydrochloride jelly, and instilled sterile water into the bladder. (The Joint Commission universal protocol was followed.)  Yes    Sedation (Moderate or Deep): None  5mL 2% lidocaine hydrochloride Urojet instilled into urethra.    NDC# [address]-6  Lot #: [id number]  Expiration Date:  10-24  Patients PVR by a bladder scan was 16 ml today.  La Lopez LPN

## 2023-06-13 NOTE — LETTER
6/13/2023       RE: Dustin Avila  [address] [place] [address]     Dear Colleague,    Thank you for referring your patient, Dustin Avila, to the [facility] at [facility]. Please see a copy of my visit note below.    Office Visit Note  [facility]  [phone]    UROLOGIC DIAGNOSES:   Split urinary stream  Nocturia    CURRENT INTERVENTIONS:       HISTORY:   This is a 52-year-old who complains of a split urinary stream and nocturia.  He does not wish to take any medications for urinary complaints so he is here today for cystoscopy for further evaluation.  He says he drinks a lot of coffee in the morning and that explains frequency during the day, but he has nocturia at night as well.   he does still drink some fluids at night.  He reports a split urinary stream.  He reports a history of a meatotomy as a child.      PAST MEDICAL HISTORY:   Past Medical History:   Diagnosis Date    Achilles tendonitis     Ankle edema     Arthritis     Coronary artery disease     cardiac cath 11/2019: no intervention; cath 2016:  + PIERRE to OM1, PIERRE to LAd, PIERRE to ramus    Hernia, abdominal     Hyperlipidemia     Incidental lung nodule     Osgood-Schlatter/osteochondroses     left    WPW (Joshua-Parkinson-White syndrome)        PAST SURGICAL HISTORY:   Past Surgical History:   Procedure Laterality Date    ARTHROSCOPY KNEE  5/8/2012    Procedure:ARTHROSCOPY KNEE; left knee arthroscopy, partial medial and lateral meniscectomy ; Surgeon:MARIA M THOMAS; Location: OR    COLONOSCOPY N/A 4/20/2022    Procedure: COLONOSCOPY, FLEXIBLE, WITH LESION REMOVAL USING SNARE;  Surgeon: Radha Ortiz MD;  Location:  GI    CV HEART CATHETERIZATION WITH POSSIBLE INTERVENTION N/A 11/19/2019    Procedure: Heart Catheterization with Possible Intervention;  Surgeon: Heydi Bro MD;  Location:  HEART CARDIAC CATH LAB    HERNIA REPAIR       ORTHOPEDIC SURGERY      left leg achilles rupture    ORTHOPEDIC SURGERY      right hip replacemnent    PENIS SURGERY      SEPTOPLASTY      VASECTOMY         FAMILY HISTORY:   Family History   Problem Relation Age of Onset    Diabetes Father        SOCIAL HISTORY:   Social History     Socioeconomic History    Marital status:      Spouse name: seperated    Number of children: 2    Years of education: None    Highest education level: None   Occupational History    Occupation: self    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   Tobacco Use    Smoking status: Former     Packs/day: 0.25     Years: 35.00     Pack years: 8.75     Types: Vaping Device, Cigarettes     Start date: 1986     Quit date: 11/1/2019     Years since quitting: 3.6    Smokeless tobacco: Never   Substance and Sexual Activity    Alcohol use: Not Currently     Alcohol/week: 0.0 standard drinks of alcohol     Comment: once weekly    Drug use: Yes     Types: Marijuana    Sexual activity: Yes     Partners: Female   Other Topics Concern    Parent/sibling w/ CABG, MI or angioplasty before 65F 55M? No    Caffeine Concern No    Sleep Concern Yes    Stress Concern Yes    Weight Concern Yes     Comment: weight loss    Exercise Yes     Comment: cardiac rehab 3 days week       Review Of Systems:  Skin: No rash, pruritis, or skin pigmentation  Eyes: No changes in vision  Ears/Nose/Throat: No changes in hearing, no nosebleeds  Respiratory: No shortness of breath, dyspnea on exertion, cough, or hemoptysis  Cardiovascular: No chest pain or palpitations  Gastrointestinal: No diarrhea or constipation. No abdominal pain. No hematochezia  Genitourinary: see HPI  Musculoskeletal: No pain or swelling of joints, normal range of motion  Neurologic: No weakness or tremors  Psychiatric: No recent changes in memory or mood  Hematologic/Lymphatic/Immunologic: No easy bruising or enlarged lymph nodes  Endocrine: No weight gain or loss      PHYSICAL EXAM:    /80 (BP Location: Right arm,  "Patient Position: Sitting, Cuff Size: Adult Regular)   Pulse 88   Ht 1.727 m (5' 8\")   Wt 93 kg (205 lb)   SpO2 97%   BMI 31.17 kg/m      Constitutional: Well developed. Conversant and in no acute distress  Eyes: Anicteric sclera, conjunctiva clear, normal extraocular movements  ENT: Normocephalic and atraumatic,   Skin: Warm and dry. No rashes or lesions  Cardiac: No peripheral edema  Back/Flank: Not done  CNS/PNS: Normal musculature and movements, moves all extremities normally  Respiratory: Normal non-labored breathing  Abdomen: Soft nontender and nondistended  Peripheral Vascular: No peripheral edema  Mental Status/Psych: Alert and Oriented x 3. Normal mood and affect    Penis: Normal  Scrotal skin: Normal, no lesions  Testicles: Normal to palpation bilaterally  Epididymis: Normal to palpation bilaterally  Lymphatic: Normal inguinal lymph nodes  Digital Rectal Exam:     Cystoscopy: I performed flexible cystoscopy.  Widely patent urethral meatus, evidence of prior meatotomy.  The urethra was normal throughout without strictures.  The bladder was normal throughout without tumors or trabeculations or other abnormalities.  On retroflexion of the scope there was no intravesical segment of the prostate.  In pulling back the scope there was no enlargement or obstruction from the prostate.    Imaging: None    Urinalysis: UA RESULTS:  Recent Labs   Lab Test 06/13/23  0816   COLOR Yellow   APPEARANCE Clear   URINEGLC Negative   URINEBILI Negative   URINEKETONE Negative   SG 1.025   UBLD Small*   URINEPH 5.5   PROTEIN Negative   UROBILINOGEN 0.2   NITRITE Negative   LEUKEST Negative       PSA: 2.1 today    Post Void Residual: 16mL    Other labs: None today      IMPRESSION:   Nocturia  Split urinary stream    PLAN:  Urine sample from today was sent for cytology today to rule out hematuria.  Otherwise, his urologic evaluation is normal.  The split urinary stream is likely because of his prior meatotomy.  I would not " recommend any medicines or procedures on the prostate.  We discussed limiting fluid intake to improve his nocturia.  He was happy with this outcome in these recommendations.  He should continue to see his primary care physician annually for PSA screening and he is welcome to follow-up with me as needed in the future.    ADDENDUM: 4RBC/hpf on urine sample from today.  I called the patient with results.  CT urogram ordered for him to complete his hematuria work-up.  I will call him with results.    Adalid Mccloud M.D.

## 2023-06-13 NOTE — PROGRESS NOTES
Office Visit Note  [facility]  [phone]    UROLOGIC DIAGNOSES:   Split urinary stream  Nocturia    CURRENT INTERVENTIONS:       HISTORY:   This is a 52-year-old who complains of a split urinary stream and nocturia.  He does not wish to take any medications for urinary complaints so he is here today for cystoscopy for further evaluation.  He says he drinks a lot of coffee in the morning and that explains frequency during the day, but he has nocturia at night as well.   he does still drink some fluids at night.  He reports a split urinary stream.  He reports a history of a meatotomy as a child.      PAST MEDICAL HISTORY:   Past Medical History:   Diagnosis Date     Achilles tendonitis      Ankle edema      Arthritis      Coronary artery disease     cardiac cath 11/2019: no intervention; cath 2016:  + PIERRE to OM1, PIERRE to LAd, PIERRE to ramus     Hernia, abdominal      Hyperlipidemia      Incidental lung nodule      Osgood-Schlatter/osteochondroses     left     WPW (Joshua-Parkinson-White syndrome)        PAST SURGICAL HISTORY:   Past Surgical History:   Procedure Laterality Date     ARTHROSCOPY KNEE  5/8/2012    Procedure:ARTHROSCOPY KNEE; left knee arthroscopy, partial medial and lateral meniscectomy ; Surgeon:MARIA M THOMAS; Location: OR     COLONOSCOPY N/A 4/20/2022    Procedure: COLONOSCOPY, FLEXIBLE, WITH LESION REMOVAL USING SNARE;  Surgeon: Radha Ortiz MD;  Location:  GI     CV HEART CATHETERIZATION WITH POSSIBLE INTERVENTION N/A 11/19/2019    Procedure: Heart Catheterization with Possible Intervention;  Surgeon: Heydi Bro MD;  Location:  HEART CARDIAC CATH LAB     HERNIA REPAIR       ORTHOPEDIC SURGERY      left leg achilles rupture     ORTHOPEDIC SURGERY      right hip replacemnent     PENIS SURGERY       SEPTOPLASTY       VASECTOMY         FAMILY HISTORY:   Family History   Problem Relation Age of Onset     Diabetes Father        SOCIAL HISTORY:   Social History  "    Socioeconomic History     Marital status:      Spouse name: seperated     Number of children: 2     Years of education: None     Highest education level: None   Occupational History     Occupation: self     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   Tobacco Use     Smoking status: Former     Packs/day: 0.25     Years: 35.00     Pack years: 8.75     Types: Vaping Device, Cigarettes     Start date: 1986     Quit date: 11/1/2019     Years since quitting: 3.6     Smokeless tobacco: Never   Substance and Sexual Activity     Alcohol use: Not Currently     Alcohol/week: 0.0 standard drinks of alcohol     Comment: once weekly     Drug use: Yes     Types: Marijuana     Sexual activity: Yes     Partners: Female   Other Topics Concern     Parent/sibling w/ CABG, MI or angioplasty before 65F 55M? No     Caffeine Concern No     Sleep Concern Yes     Stress Concern Yes     Weight Concern Yes     Comment: weight loss     Exercise Yes     Comment: cardiac rehab 3 days week       Review Of Systems:  Skin: No rash, pruritis, or skin pigmentation  Eyes: No changes in vision  Ears/Nose/Throat: No changes in hearing, no nosebleeds  Respiratory: No shortness of breath, dyspnea on exertion, cough, or hemoptysis  Cardiovascular: No chest pain or palpitations  Gastrointestinal: No diarrhea or constipation. No abdominal pain. No hematochezia  Genitourinary: see HPI  Musculoskeletal: No pain or swelling of joints, normal range of motion  Neurologic: No weakness or tremors  Psychiatric: No recent changes in memory or mood  Hematologic/Lymphatic/Immunologic: No easy bruising or enlarged lymph nodes  Endocrine: No weight gain or loss      PHYSICAL EXAM:    /80 (BP Location: Right arm, Patient Position: Sitting, Cuff Size: Adult Regular)   Pulse 88   Ht 1.727 m (5' 8\")   Wt 93 kg (205 lb)   SpO2 97%   BMI 31.17 kg/m      Constitutional: Well developed. Conversant and in no acute distress  Eyes: Anicteric sclera, conjunctiva clear, normal " extraocular movements  ENT: Normocephalic and atraumatic,   Skin: Warm and dry. No rashes or lesions  Cardiac: No peripheral edema  Back/Flank: Not done  CNS/PNS: Normal musculature and movements, moves all extremities normally  Respiratory: Normal non-labored breathing  Abdomen: Soft nontender and nondistended  Peripheral Vascular: No peripheral edema  Mental Status/Psych: Alert and Oriented x 3. Normal mood and affect    Penis: Normal  Scrotal skin: Normal, no lesions  Testicles: Normal to palpation bilaterally  Epididymis: Normal to palpation bilaterally  Lymphatic: Normal inguinal lymph nodes  Digital Rectal Exam:     Cystoscopy: I performed flexible cystoscopy.  Widely patent urethral meatus, evidence of prior meatotomy.  The urethra was normal throughout without strictures.  The bladder was normal throughout without tumors or trabeculations or other abnormalities.  On retroflexion of the scope there was no intravesical segment of the prostate.  In pulling back the scope there was no enlargement or obstruction from the prostate.    Imaging: None    Urinalysis: UA RESULTS:  Recent Labs   Lab Test 06/13/23  0816   COLOR Yellow   APPEARANCE Clear   URINEGLC Negative   URINEBILI Negative   URINEKETONE Negative   SG 1.025   UBLD Small*   URINEPH 5.5   PROTEIN Negative   UROBILINOGEN 0.2   NITRITE Negative   LEUKEST Negative       PSA: 2.1 today    Post Void Residual: 16mL    Other labs: None today      IMPRESSION:   Nocturia  Split urinary stream    PLAN:  Urine sample from today was sent for cytology today to rule out hematuria.  Otherwise, his urologic evaluation is normal.  The split urinary stream is likely because of his prior meatotomy.  I would not recommend any medicines or procedures on the prostate.  We discussed limiting fluid intake to improve his nocturia.  He was happy with this outcome in these recommendations.  He should continue to see his primary care physician annually for PSA screening and he is  welcome to follow-up with me as needed in the future.    ADDENDUM: 4RBC/hpf on urine sample from today.  I called the patient with results.  CT urogram ordered for him to complete his hematuria work-up.  I will call him with results.    Adalid Mccloud M.D.

## 2023-06-27 ENCOUNTER — ANCILLARY PROCEDURE (OUTPATIENT)
Dept: CT IMAGING | Facility: CLINIC | Age: 53
End: 2023-06-27
Attending: UROLOGY
Payer: COMMERCIAL

## 2023-06-27 DIAGNOSIS — R31.29 MICROSCOPIC HEMATURIA: ICD-10-CM

## 2023-06-27 PROCEDURE — 74178 CT ABD&PLV WO CNTR FLWD CNTR: CPT

## 2023-06-27 PROCEDURE — 250N000011 HC RX IP 250 OP 636: Mod: JZ | Performed by: UROLOGY

## 2023-06-27 RX ORDER — IOPAMIDOL 755 MG/ML
100 INJECTION, SOLUTION INTRAVASCULAR ONCE
Status: COMPLETED | OUTPATIENT
Start: 2023-06-27 | End: 2023-06-27

## 2023-06-27 RX ADMIN — IOPAMIDOL 100 ML: 755 INJECTION, SOLUTION INTRAVENOUS at 15:39

## 2023-07-07 ENCOUNTER — TELEPHONE (OUTPATIENT)
Dept: CARDIOLOGY | Facility: CLINIC | Age: 53
End: 2023-07-07
Payer: COMMERCIAL

## 2023-07-07 DIAGNOSIS — E78.5 HYPERLIPIDEMIA LDL GOAL <70: Primary | ICD-10-CM

## 2023-07-07 DIAGNOSIS — I25.10 CORONARY ARTERY DISEASE INVOLVING NATIVE CORONARY ARTERY OF NATIVE HEART WITHOUT ANGINA PECTORIS: ICD-10-CM

## 2023-07-07 RX ORDER — METOPROLOL SUCCINATE 25 MG/1
25 TABLET, EXTENDED RELEASE ORAL DAILY
Qty: 90 TABLET | Refills: 0 | Status: CANCELLED | OUTPATIENT
Start: 2023-07-07

## 2023-07-07 NOTE — TELEPHONE ENCOUNTER
[facility] Cardiology Refill Guideline reviewed.  Medication does not meet criteria for refill due to overdue.  Messaged to providers team for further review.

## 2023-07-07 NOTE — TELEPHONE ENCOUNTER
Refill request is for metoprolol succinate 25mg daily to be sent to [facility] in [place]. Pt was last seen 3/2022 and advised to follow up in 6mos. No follow up currently scheduled. A letter was sent 12/2022 and 2/2023, verbal reminder given 6/1/23 at which time patient agreed to call and set up follow up.     Called patient, left a message that an appointment is needed for any refills. Scheduling phone number provided. Team 2 phone number provided if any questions, reviewed that refill can be sent once follow up is made.

## 2023-07-11 NOTE — TELEPHONE ENCOUNTER
Spoke to patient, reviewed need for follow up visit and labs for refills. He verbalized understanding, will call this week to set something up. Discussed that metoprolol can be renewed once follow up is made.

## 2023-07-25 DIAGNOSIS — I25.10 CORONARY ARTERY DISEASE INVOLVING NATIVE CORONARY ARTERY OF NATIVE HEART WITHOUT ANGINA PECTORIS: ICD-10-CM

## 2023-07-25 RX ORDER — ROSUVASTATIN CALCIUM 40 MG/1
40 TABLET, COATED ORAL DAILY
Qty: 90 TABLET | Refills: 0 | Status: SHIPPED | OUTPATIENT
Start: 2023-07-25 | End: 2023-10-24

## 2023-07-25 RX ORDER — METOPROLOL SUCCINATE 25 MG/1
25 TABLET, EXTENDED RELEASE ORAL DAILY
Qty: 90 TABLET | Refills: 0 | Status: SHIPPED | OUTPATIENT
Start: 2023-07-25 | End: 2023-10-24

## 2023-07-27 ENCOUNTER — LAB (OUTPATIENT)
Dept: LAB | Facility: CLINIC | Age: 53
End: 2023-07-27
Payer: COMMERCIAL

## 2023-07-27 DIAGNOSIS — I25.10 CORONARY ARTERY DISEASE INVOLVING NATIVE CORONARY ARTERY OF NATIVE HEART WITHOUT ANGINA PECTORIS: ICD-10-CM

## 2023-07-27 DIAGNOSIS — E78.5 HYPERLIPIDEMIA LDL GOAL <70: ICD-10-CM

## 2023-07-27 LAB
ALT SERPL W P-5'-P-CCNC: 23 U/L (ref 0–70)
CHOLEST SERPL-MCNC: 136 MG/DL
HDLC SERPL-MCNC: 40 MG/DL
LDLC SERPL CALC-MCNC: 79 MG/DL
NONHDLC SERPL-MCNC: 96 MG/DL
TRIGL SERPL-MCNC: 84 MG/DL

## 2023-07-27 PROCEDURE — 36415 COLL VENOUS BLD VENIPUNCTURE: CPT

## 2023-07-27 PROCEDURE — 84460 ALANINE AMINO (ALT) (SGPT): CPT

## 2023-07-27 PROCEDURE — 80061 LIPID PANEL: CPT

## 2023-08-01 ENCOUNTER — OFFICE VISIT (OUTPATIENT)
Dept: CARDIOLOGY | Facility: CLINIC | Age: 53
End: 2023-08-01
Payer: COMMERCIAL

## 2023-08-01 VITALS
OXYGEN SATURATION: 94 % | BODY MASS INDEX: 31.55 KG/M2 | SYSTOLIC BLOOD PRESSURE: 114 MMHG | HEART RATE: 81 BPM | WEIGHT: 208.2 LBS | DIASTOLIC BLOOD PRESSURE: 72 MMHG | HEIGHT: 68 IN

## 2023-08-01 DIAGNOSIS — E78.5 HYPERLIPIDEMIA LDL GOAL <70: ICD-10-CM

## 2023-08-01 DIAGNOSIS — Z72.0 TOBACCO ABUSE DISORDER: ICD-10-CM

## 2023-08-01 DIAGNOSIS — R73.01 IFG (IMPAIRED FASTING GLUCOSE): ICD-10-CM

## 2023-08-01 DIAGNOSIS — I25.10 CORONARY ARTERY DISEASE INVOLVING NATIVE CORONARY ARTERY OF NATIVE HEART WITHOUT ANGINA PECTORIS: Primary | ICD-10-CM

## 2023-08-01 DIAGNOSIS — I87.2 VENOUS (PERIPHERAL) INSUFFICIENCY: ICD-10-CM

## 2023-08-01 DIAGNOSIS — R60.0 PERIPHERAL EDEMA: ICD-10-CM

## 2023-08-01 PROCEDURE — 99214 OFFICE O/P EST MOD 30 MIN: CPT | Performed by: INTERNAL MEDICINE

## 2023-08-01 NOTE — PROGRESS NOTES
HPI and Plan:    Cynthia is a very nice 52-year-old gentleman  with past medical history significant for ongoing tobacco abuse, hypercholesterolemia, impaired fasting glucose, a non-ST segment elevation myocardial infarction in 06/2016 at which time Dr. Wiley placed drug-eluting stents to his left anterior descending artery, ramus intermedius branch and first obtuse marginal.  This left him with an occluded continuation of right coronary artery with posterolateral branches filling by left-to-right collaterals.  Ejection fraction was 50%-55%.     He has asymptomatic Joshua-Parkinson-White, first described at [facility] in 2011.  He was seen in consultation by Dr. Damion Toledo who recommended medical management.     In 2019, as part of a preoperative evaluation, he underwent a stress nuclear scan which demonstrated a moderate-sized, partially reversible inferior, inferolateral segment and a small basilar anterior area of reversibility.  Ejection fraction was 45%.  Followup echocardiogram estimated his ejection fraction to be 50%-55% without focal wall motion abnormalities or significant valvular disease.  An angiogram also in 2019 demonstrated all stents to be widely patent.  However, he had scattered mild-to-moderate disease including distal left main, multiple lesions in his right coronary medical artery, left anterior descending artery and circumflex coronary artery.  None appeared to be flow limiting.  He was cleared for surgery and underwent hip surgeries, bilateral knee surgeries, and subsequently a herniorrhaphy all without events.     Unfortunately, he stopped exercising for a while, was trying to quit smoking by switching to vaping and has gained 23 pounds of weight.     Cynthia returns to clinic stating he thinks he is doing well from a cardiac standpoint.  He is back working out 2-3 times a week for the last 3 months, is using a  .  None of this causes him any symptoms.  He asks about whether the WEL  Program is restarting again.  He has managed to lose about 17 pounds of weight.  He states he got up to over 220 pounds.    Unfortunately still smoking a few cigarettes a day.     He notes no side effects with his current medical regimen, although he has an aversion to medications and had stopped his statin previously.       ASSESSMENT AND PLAN:  Dustin appears to be doing well from a cardiac standpoint without clinical evidence of ischemia, heart failure or significant arrhythmia.  Last year I looked at his angiogram.  He does have diffuse scattered disease in some high-risk locations and we need to be very aggressive with his risk factor modification and I have emphasized that to him.     First and foremost, he has to get off the cigarettes altogether.  He states he is only smoking 1-3 cigarettes a day.       I have asked him to try to increase his exercise to 4-5 times a week for aerobic activity.  He also needs to add flexibility and balance training.     His cholesterol has backslid from last year.  I have emphasized the importance of taking it consistently.  We talked about adding Zetia.  We also talked about the fact that guidelines have changed.  Although that being said his LDL was 51 last year.  He prefers to recheck his fasting lipid profile in 3 months.  At that time if not to goal he will be willing to consider adding Zetia.     Peripheral edema is doing quite well at this time.     We will continue him on his aspirin and metoprolol.       Creatinine, liver function test and electrolytes are     As stated we will check blood work in 3 months we will plan on follow-up visit in 1 year.  We will be glad to see him sooner if he should have any problems.  Thank you for allow me to participate in his care.  Sincerely     Adalid Kinney MD, [facility]          Today's clinic visit entailed:  Review of the result(s) of each unique test - lab work  Ordering of each unique test  Prescription drug management  35  minutes spent by me on the date of the encounter doing chart review, history and exam, documentation and further activities per the note  Provider  Link to MDM Help Grid     The level of medical decision making during this visit was of moderate complexity.      Orders Placed This Encounter   Procedures    Basic metabolic panel    Lipid Profile    ALT    Basic metabolic panel    Lipid Profile    ALT    Follow-Up with Cardiology       No orders of the defined types were placed in this encounter.      There are no discontinued medications.      Encounter Diagnoses   Name Primary?    Coronary artery disease involving native coronary artery of native heart without angina pectoris Yes    Hyperlipidemia LDL goal <70     Tobacco abuse disorder     Venous (peripheral) insufficiency     Peripheral edema     IFG (impaired fasting glucose)        CURRENT MEDICATIONS:  Current Outpatient Medications   Medication Sig Dispense Refill    aspirin EC 81 MG EC tablet Take 1 tablet (81 mg) by mouth daily 90 tablet 3    medical cannabis (Patient's own supply) See Admin Instructions (The purpose of this order is to document that the patient reports taking medical cannabis.  This is not a prescription, and is not used to certify that the patient has a qualifying medical condition.)      metoprolol succinate ER (TOPROL XL) 25 MG 24 hr tablet Take 1 tablet (25 mg) by mouth daily Appointment needed for further refills 90 tablet 0    nitroGLYcerin (NITROSTAT) 0.4 MG sublingual tablet PLACE 1 TAB TO TONGUE EVERY 5 MINS AS NEEDED-CHEST PAIN. CALL 911 AFTER 3 DOSES IF SYMPTOMS PERSIST 25 tablet 0    rosuvastatin (CRESTOR) 40 MG tablet Take 1 tablet (40 mg) by mouth daily Appointment required for further refills 90 tablet 0       ALLERGIES     Allergies   Allergen Reactions    Cyanoacrylate Itching and Rash    Adhesive Tape Rash     dermabond  dermabond         PAST MEDICAL HISTORY:  Past Medical History:   Diagnosis Date    Achilles tendonitis      Ankle edema     Arthritis     Coronary artery disease     cardiac cath 11/2019: no intervention; cath 2016:  + PIERRE to OM1, PIERRE to LAd, PIERRE to ramus    Hernia, abdominal     Hyperlipidemia     Incidental lung nodule     Osgood-Schlatter/osteochondroses     left    WPW (Joshua-Parkinson-White syndrome)        PAST SURGICAL HISTORY:  Past Surgical History:   Procedure Laterality Date    ARTHROSCOPY KNEE  5/8/2012    Procedure:ARTHROSCOPY KNEE; left knee arthroscopy, partial medial and lateral meniscectomy ; Surgeon:MARIA M THOMAS; Location: OR    COLONOSCOPY N/A 4/20/2022    Procedure: COLONOSCOPY, FLEXIBLE, WITH LESION REMOVAL USING SNARE;  Surgeon: Radha Ortiz MD;  Location:  GI    CV HEART CATHETERIZATION WITH POSSIBLE INTERVENTION N/A 11/19/2019    Procedure: Heart Catheterization with Possible Intervention;  Surgeon: Heydi Bro MD;  Location:  HEART CARDIAC CATH LAB    HERNIA REPAIR      ORTHOPEDIC SURGERY      left leg achilles rupture    ORTHOPEDIC SURGERY      right hip replacemnent    PENIS SURGERY      SEPTOPLASTY      VASECTOMY         FAMILY HISTORY:  Family History   Problem Relation Age of Onset    Diabetes Father        SOCIAL HISTORY:  Social History     Socioeconomic History    Marital status:      Spouse name: seperated    Number of children: 2    Years of education: None    Highest education level: None   Occupational History    Occupation: self    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   Tobacco Use    Smoking status: Former     Packs/day: 0.25     Years: 35.00     Pack years: 8.75     Types: Cigarettes     Start date: 1986     Quit date: 11/1/2019     Years since quitting: 3.7    Smokeless tobacco: Never   Substance and Sexual Activity    Alcohol use: Not Currently     Alcohol/week: 0.0 standard drinks of alcohol     Comment: once weekly    Drug use: Yes     Types: Marijuana    Sexual activity: Yes     Partners: Female   Other Topics Concern    Parent/sibling w/ CABG, MI or  "angioplasty before 65F 55M? No    Caffeine Concern No    Sleep Concern Yes    Stress Concern Yes    Weight Concern Yes     Comment: weight loss    Exercise Yes     Comment: cardiac rehab 3 days week       Review of Systems:  Skin:  not assessed       Eyes:  not assessed      ENT:  not assessed      Respiratory:  Negative       Cardiovascular:  Negative      Gastroenterology: not assessed      Genitourinary:  not assessed      Musculoskeletal:  not assessed      Neurologic:  not assessed      Psychiatric:  not assessed      Heme/Lymph/Imm:  not assessed      Endocrine:  not assessed        Physical Exam:  Vitals: /72   Pulse 81   Ht 1.727 m (5' 8\")   Wt 94.4 kg (208 lb 3.2 oz)   SpO2 94%   BMI 31.66 kg/m      Constitutional:  cooperative, alert and oriented, well developed, well nourished, in no acute distress overweight      Skin:  warm and dry to the touch, no apparent skin lesions or masses noted          Head:  normocephalic, no masses or lesions        Eyes:  pupils equal and round, conjunctivae and lids unremarkable, sclera white, no xanthalasma, EOMS intact, no nystagmus        Lymph:      ENT:  no pallor or cyanosis, dentition good        Neck:           Respiratory:  normal breath sounds, clear to auscultation, normal A-P diameter, normal symmetry, normal respiratory excursion, no use of accessory muscles         Cardiac: regular rhythm;no murmurs, gallops or rubs detected                pulses full and equal                                        GI:           Extremities and Muscular Skeletal:  no spinal abnormalities noted;normal muscle strength and tone   bilateral LE edema;trace;varicose vein     left arm in a sling    Neurological:  no gross motor deficits        Psych:  affect appropriate, oriented to time, person and place        CC  No referring provider defined for this encounter.                "

## 2023-08-01 NOTE — LETTER
8/1/2023    [address] [address]    RE: Dustin Avila       Dear Colleague,     I had the pleasure of seeing Dustin Avila in the [facility].  HPI and Plan:    Cynthia is a very nice 52-year-old gentleman  with past medical history significant for ongoing tobacco abuse, hypercholesterolemia, impaired fasting glucose, a non-ST segment elevation myocardial infarction in 06/2016 at which time Dr. Wiley placed drug-eluting stents to his left anterior descending artery, ramus intermedius branch and first obtuse marginal.  This left him with an occluded continuation of right coronary artery with posterolateral branches filling by left-to-right collaterals.  Ejection fraction was 50%-55%.     He has asymptomatic Joshua-Parkinson-White, first described at [facility] in 2011.  He was seen in consultation by Dr. Damion Toledo who recommended medical management.     In 2019, as part of a preoperative evaluation, he underwent a stress nuclear scan which demonstrated a moderate-sized, partially reversible inferior, inferolateral segment and a small basilar anterior area of reversibility.  Ejection fraction was 45%.  Followup echocardiogram estimated his ejection fraction to be 50%-55% without focal wall motion abnormalities or significant valvular disease.  An angiogram also in 2019 demonstrated all stents to be widely patent.  However, he had scattered mild-to-moderate disease including distal left main, multiple lesions in his right coronary medical artery, left anterior descending artery and circumflex coronary artery.  None appeared to be flow limiting.  He was cleared for surgery and underwent hip surgeries, bilateral knee surgeries, and subsequently a herniorrhaphy all without events.     Unfortunately, he stopped exercising for a while, was trying to quit smoking by switching to vaping and has gained 23 pounds of weight.     Cynthia returns to  clinic stating he thinks he is doing well from a cardiac standpoint.  He is back working out 2-3 times a week for the last 3 months, is using a  .  None of this causes him any symptoms.  He asks about whether the WEL Program is restarting again.  He has managed to lose about 17 pounds of weight.  He states he got up to over 220 pounds.    Unfortunately still smoking a few cigarettes a day.     He notes no side effects with his current medical regimen, although he has an aversion to medications and had stopped his statin previously.       ASSESSMENT AND PLAN:  Dustin appears to be doing well from a cardiac standpoint without clinical evidence of ischemia, heart failure or significant arrhythmia.  Last year I looked at his angiogram.  He does have diffuse scattered disease in some high-risk locations and we need to be very aggressive with his risk factor modification and I have emphasized that to him.     First and foremost, he has to get off the cigarettes altogether.  He states he is only smoking 1-3 cigarettes a day.       I have asked him to try to increase his exercise to 4-5 times a week for aerobic activity.  He also needs to add flexibility and balance training.     His cholesterol has backslid from last year.  I have emphasized the importance of taking it consistently.  We talked about adding Zetia.  We also talked about the fact that guidelines have changed.  Although that being said his LDL was 51 last year.  He prefers to recheck his fasting lipid profile in 3 months.  At that time if not to goal he will be willing to consider adding Zetia.     Peripheral edema is doing quite well at this time.     We will continue him on his aspirin and metoprolol.       Creatinine, liver function test and electrolytes are     As stated we will check blood work in 3 months we will plan on follow-up visit in 1 year.  We will be glad to see him sooner if he should have any problems.  Thank you for allow me to  participate in his care.  Sincerely     Adalid Kinney MD, [facility]          Today's clinic visit entailed:  Review of the result(s) of each unique test - lab work  Ordering of each unique test  Prescription drug management  35 minutes spent by me on the date of the encounter doing chart review, history and exam, documentation and further activities per the note  Provider  Link to [facility] Help Grid     The level of medical decision making during this visit was of moderate complexity.      Orders Placed This Encounter   Procedures    Basic metabolic panel    Lipid Profile    ALT    Basic metabolic panel    Lipid Profile    ALT    Follow-Up with Cardiology       No orders of the defined types were placed in this encounter.      There are no discontinued medications.      Encounter Diagnoses   Name Primary?    Coronary artery disease involving native coronary artery of native heart without angina pectoris Yes    Hyperlipidemia LDL goal <70     Tobacco abuse disorder     Venous (peripheral) insufficiency     Peripheral edema     IFG (impaired fasting glucose)        CURRENT MEDICATIONS:  Current Outpatient Medications   Medication Sig Dispense Refill    aspirin EC 81 MG EC tablet Take 1 tablet (81 mg) by mouth daily 90 tablet 3    medical cannabis (Patient's own supply) See Admin Instructions (The purpose of this order is to document that the patient reports taking medical cannabis.  This is not a prescription, and is not used to certify that the patient has a qualifying medical condition.)      metoprolol succinate ER (TOPROL XL) 25 MG 24 hr tablet Take 1 tablet (25 mg) by mouth daily Appointment needed for further refills 90 tablet 0    nitroGLYcerin (NITROSTAT) 0.4 MG sublingual tablet PLACE 1 TAB TO TONGUE EVERY 5 MINS AS NEEDED-CHEST PAIN. CALL 911 AFTER 3 DOSES IF SYMPTOMS PERSIST 25 tablet 0    rosuvastatin (CRESTOR) 40 MG tablet Take 1 tablet (40 mg) by mouth daily Appointment required for further refills 90  tablet 0       ALLERGIES     Allergies   Allergen Reactions    Cyanoacrylate Itching and Rash    Adhesive Tape Rash     dermabond  dermabond         PAST MEDICAL HISTORY:  Past Medical History:   Diagnosis Date    Achilles tendonitis     Ankle edema     Arthritis     Coronary artery disease     cardiac cath 11/2019: no intervention; cath 2016:  + PIERRE to OM1, PIERRE to LAd, PIERRE to ramus    Hernia, abdominal     Hyperlipidemia     Incidental lung nodule     Osgood-Schlatter/osteochondroses     left    WPW (Joshua-Parkinson-White syndrome)        PAST SURGICAL HISTORY:  Past Surgical History:   Procedure Laterality Date    ARTHROSCOPY KNEE  5/8/2012    Procedure:ARTHROSCOPY KNEE; left knee arthroscopy, partial medial and lateral meniscectomy ; Surgeon:MARIA M THOMAS; Location: OR    COLONOSCOPY N/A 4/20/2022    Procedure: COLONOSCOPY, FLEXIBLE, WITH LESION REMOVAL USING SNARE;  Surgeon: Radha Ortiz MD;  Location:  GI    CV HEART CATHETERIZATION WITH POSSIBLE INTERVENTION N/A 11/19/2019    Procedure: Heart Catheterization with Possible Intervention;  Surgeon: Heydi Bro MD;  Location:  HEART CARDIAC CATH LAB    HERNIA REPAIR      ORTHOPEDIC SURGERY      left leg achilles rupture    ORTHOPEDIC SURGERY      right hip replacemnent    PENIS SURGERY      SEPTOPLASTY      VASECTOMY         FAMILY HISTORY:  Family History   Problem Relation Age of Onset    Diabetes Father        SOCIAL HISTORY:  Social History     Socioeconomic History    Marital status:      Spouse name: seperated    Number of children: 2    Years of education: None    Highest education level: None   Occupational History    Occupation: self    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   Tobacco Use    Smoking status: Former     Packs/day: 0.25     Years: 35.00     Pack years: 8.75     Types: Cigarettes     Start date: 1986     Quit date: 11/1/2019     Years since quitting: 3.7    Smokeless tobacco: Never   Substance and Sexual Activity    Alcohol  "use: Not Currently     Alcohol/week: 0.0 standard drinks of alcohol     Comment: once weekly    Drug use: Yes     Types: Marijuana    Sexual activity: Yes     Partners: Female   Other Topics Concern    Parent/sibling w/ CABG, MI or angioplasty before 65F 55M? No    Caffeine Concern No    Sleep Concern Yes    Stress Concern Yes    Weight Concern Yes     Comment: weight loss    Exercise Yes     Comment: cardiac rehab 3 days week       Review of Systems:  Skin:  not assessed       Eyes:  not assessed      ENT:  not assessed      Respiratory:  Negative       Cardiovascular:  Negative      Gastroenterology: not assessed      Genitourinary:  not assessed      Musculoskeletal:  not assessed      Neurologic:  not assessed      Psychiatric:  not assessed      Heme/Lymph/Imm:  not assessed      Endocrine:  not assessed        Physical Exam:  Vitals: /72   Pulse 81   Ht 1.727 m (5' 8\")   Wt 94.4 kg (208 lb 3.2 oz)   SpO2 94%   BMI 31.66 kg/m      Constitutional:  cooperative, alert and oriented, well developed, well nourished, in no acute distress overweight      Skin:  warm and dry to the touch, no apparent skin lesions or masses noted          Head:  normocephalic, no masses or lesions        Eyes:  pupils equal and round, conjunctivae and lids unremarkable, sclera white, no xanthalasma, EOMS intact, no nystagmus        Lymph:      ENT:  no pallor or cyanosis, dentition good        Neck:           Respiratory:  normal breath sounds, clear to auscultation, normal A-P diameter, normal symmetry, normal respiratory excursion, no use of accessory muscles         Cardiac: regular rhythm;no murmurs, gallops or rubs detected                pulses full and equal                                        GI:           Extremities and Muscular Skeletal:  no spinal abnormalities noted;normal muscle strength and tone   bilateral LE edema;trace;varicose vein     left arm in a sling    Neurological:  no gross motor deficits    "     Psych:  affect appropriate, oriented to time, person and place        CC  No referring provider defined for this encounter.                  Thank you for allowing me to participate in the care of your patient.      Sincerely,     Adalid Kinney MD     [facility]

## 2023-10-24 DIAGNOSIS — I25.10 CORONARY ARTERY DISEASE INVOLVING NATIVE CORONARY ARTERY OF NATIVE HEART WITHOUT ANGINA PECTORIS: ICD-10-CM

## 2023-10-24 RX ORDER — METOPROLOL SUCCINATE 25 MG/1
25 TABLET, EXTENDED RELEASE ORAL DAILY
Qty: 90 TABLET | Refills: 0 | Status: SHIPPED | OUTPATIENT
Start: 2023-10-24 | End: 2024-03-18

## 2023-10-24 RX ORDER — ROSUVASTATIN CALCIUM 40 MG/1
40 TABLET, COATED ORAL DAILY
Qty: 90 TABLET | Refills: 0 | Status: SHIPPED | OUTPATIENT
Start: 2023-10-24 | End: 2024-01-24

## 2023-11-21 DIAGNOSIS — I25.10 CORONARY ARTERY DISEASE INVOLVING NATIVE CORONARY ARTERY OF NATIVE HEART WITHOUT ANGINA PECTORIS: ICD-10-CM

## 2023-11-21 RX ORDER — NITROGLYCERIN 0.4 MG/1
TABLET SUBLINGUAL
Qty: 25 TABLET | Refills: 3 | Status: SHIPPED | OUTPATIENT
Start: 2023-11-21

## 2024-01-24 DIAGNOSIS — I25.10 CORONARY ARTERY DISEASE INVOLVING NATIVE CORONARY ARTERY OF NATIVE HEART WITHOUT ANGINA PECTORIS: ICD-10-CM

## 2024-01-24 RX ORDER — ROSUVASTATIN CALCIUM 40 MG/1
40 TABLET, COATED ORAL DAILY
Qty: 90 TABLET | Refills: 2 | Status: SHIPPED | OUTPATIENT
Start: 2024-01-24

## 2024-01-24 NOTE — TELEPHONE ENCOUNTER
[facility] Cardiology Refill Guideline reviewed.  Medication meets criteria for refill.  Pt was due for repeat fasting labs in November to determine if zetia is indicated, not done. Scheduling messaged to arrange.

## 2024-01-29 ENCOUNTER — TELEPHONE (OUTPATIENT)
Dept: CARDIOLOGY | Facility: CLINIC | Age: 54
End: 2024-01-29
Payer: COMMERCIAL

## 2024-01-29 DIAGNOSIS — E78.5 HYPERLIPIDEMIA LDL GOAL <70: ICD-10-CM

## 2024-01-29 DIAGNOSIS — R73.01 IFG (IMPAIRED FASTING GLUCOSE): ICD-10-CM

## 2024-01-29 DIAGNOSIS — I25.10 CORONARY ARTERY DISEASE INVOLVING NATIVE CORONARY ARTERY OF NATIVE HEART WITHOUT ANGINA PECTORIS: Primary | ICD-10-CM

## 2024-01-29 NOTE — LETTER
January 29, 2024       TO: Dustin Avila  [id number] [place] [address]       [facility], Mr. Avila,    Dr. Kinney ordered a follow up lipid panel for a 3-month check. This was due in November.   The scheduling team has been unable to reach you to try to set up this appointment.    Please call the scheduling team at [phone] to schedule the overdue fasting lab appointment.     If your LDL is not at the goal of <55, then Dr. Kinney will consider adding an additional medication to your therapy.    Thank you  Team 2 R.N.s  [phone]  [facility]

## 2024-01-29 NOTE — TELEPHONE ENCOUNTER
Scheduling unable to reach patient to set up lipids and alt to check if LDL goal has been reached. Letter sent    Agustina,  [place],    Dr. Kinney ordered a follow up lipid panel for a 3-month check. This was due in November.   The scheduling team has been unable to reach you to try to set up this appointment.    Please call the scheduling team at [phone] to schedule the overdue fasting lab appointment.     If your LDL is not at the goal of <55, then Dr. Kinney will consider adding an additional medication to your therapy.    Thank you  Team 2 R.N.s  [phone]

## 2024-03-18 DIAGNOSIS — I25.10 CORONARY ARTERY DISEASE INVOLVING NATIVE CORONARY ARTERY OF NATIVE HEART WITHOUT ANGINA PECTORIS: ICD-10-CM

## 2024-03-18 RX ORDER — METOPROLOL SUCCINATE 25 MG/1
25 TABLET, EXTENDED RELEASE ORAL DAILY
Qty: 90 TABLET | Refills: 2 | Status: SHIPPED | OUTPATIENT
Start: 2024-03-18

## 2024-04-11 ENCOUNTER — LAB (OUTPATIENT)
Dept: LAB | Facility: CLINIC | Age: 54
End: 2024-04-11
Payer: COMMERCIAL

## 2024-04-11 ENCOUNTER — TELEPHONE (OUTPATIENT)
Dept: CARDIOLOGY | Facility: CLINIC | Age: 54
End: 2024-04-11

## 2024-04-11 DIAGNOSIS — I25.10 CORONARY ARTERY DISEASE INVOLVING NATIVE CORONARY ARTERY OF NATIVE HEART WITHOUT ANGINA PECTORIS: Chronic | ICD-10-CM

## 2024-04-11 DIAGNOSIS — E78.5 HYPERLIPIDEMIA LDL GOAL <70: Primary | ICD-10-CM

## 2024-04-11 DIAGNOSIS — R73.01 IFG (IMPAIRED FASTING GLUCOSE): ICD-10-CM

## 2024-04-11 DIAGNOSIS — E78.5 HYPERLIPIDEMIA LDL GOAL <70: ICD-10-CM

## 2024-04-11 DIAGNOSIS — I25.10 CORONARY ARTERY DISEASE INVOLVING NATIVE CORONARY ARTERY OF NATIVE HEART WITHOUT ANGINA PECTORIS: ICD-10-CM

## 2024-04-11 LAB
ALT SERPL W P-5'-P-CCNC: 63 U/L (ref 0–70)
ANION GAP SERPL CALCULATED.3IONS-SCNC: 12 MMOL/L (ref 7–15)
BUN SERPL-MCNC: 15.5 MG/DL (ref 6–20)
CALCIUM SERPL-MCNC: 9.3 MG/DL (ref 8.6–10)
CHLORIDE SERPL-SCNC: 104 MMOL/L (ref 98–107)
CHOLEST SERPL-MCNC: 119 MG/DL
CREAT SERPL-MCNC: 0.89 MG/DL (ref 0.67–1.17)
DEPRECATED HCO3 PLAS-SCNC: 23 MMOL/L (ref 22–29)
EGFRCR SERPLBLD CKD-EPI 2021: >90 ML/MIN/1.73M2
FASTING STATUS PATIENT QL REPORTED: YES
GLUCOSE SERPL-MCNC: 93 MG/DL (ref 70–99)
HDLC SERPL-MCNC: 38 MG/DL
LDLC SERPL CALC-MCNC: 65 MG/DL
NONHDLC SERPL-MCNC: 81 MG/DL
POTASSIUM SERPL-SCNC: 4.3 MMOL/L (ref 3.4–5.3)
SODIUM SERPL-SCNC: 139 MMOL/L (ref 135–145)
TRIGL SERPL-MCNC: 82 MG/DL

## 2024-04-11 PROCEDURE — 36415 COLL VENOUS BLD VENIPUNCTURE: CPT | Performed by: INTERNAL MEDICINE

## 2024-04-11 PROCEDURE — 84460 ALANINE AMINO (ALT) (SGPT): CPT | Performed by: INTERNAL MEDICINE

## 2024-04-11 PROCEDURE — 80048 BASIC METABOLIC PNL TOTAL CA: CPT | Performed by: INTERNAL MEDICINE

## 2024-04-11 PROCEDURE — 80061 LIPID PANEL: CPT | Performed by: INTERNAL MEDICINE

## 2024-04-11 NOTE — TELEPHONE ENCOUNTER
Lipid panel 4/11/2024 noted. Overdue from November - wanted a new baseline checked before agreeing to add zetia.    Component      Latest Ref Rng 7/27/2023  2:09 PM 4/11/2024  7:51 AM   Cholesterol      <200 mg/dL 136  119    Triglycerides      <150 mg/dL 84  82    HDL Cholesterol      >=40 mg/dL 40  38 (L)    LDL Cholesterol Calculated      <=100 mg/dL 79  65    Non HDL Cholesterol      <130 mg/dL 96  81    Patient Fasting?  Yes    ALT      0 - 70 U/L 23  63       Per Dr. Kinney's dictation 8/1/2023:  His cholesterol has backslid from last year.  I have emphasized the importance of taking it consistently.  We talked about adding Zetia.  We also talked about the fact that guidelines have changed.  Although that being said his LDL was 51 last year.  He prefers to recheck his fasting lipid profile in 3 months.  At that time if not to goal he will be willing to consider adding Zetia.     Next appointment is his annual visit in August, 2024    Will message Dr. Kinney to review

## 2024-04-12 NOTE — TELEPHONE ENCOUNTER
Spoke with Patient regarding labs.  Patient had already reviewed them via My Chart,  Dr. Kinney's message reviewed.  Patient states he does not want to start etia at this time. Agrees with continuing predominately plant-based diet, exercise including both aerobic and resistance activity and maintaining ideal body weight will check again in 6 months  Lab orders placed.     Dr. Kinney's reply regarding labs  LDL is much improved down from 79 to 65.  He is getting close to the target of 55.  If he wants to continue with predominately plant-based diet, exercise including both aerobic and resistance activity and maintaining ideal body weight we can check again in 6 months or start Zetia now.  If he wants to start Zetia 10 mg daily 90 pills for refills, otherwise recheck fasting lipid profile in 6 months

## 2024-04-29 ENCOUNTER — TRANSFERRED RECORDS (OUTPATIENT)
Dept: HEALTH INFORMATION MANAGEMENT | Facility: CLINIC | Age: 54
End: 2024-04-29
Payer: COMMERCIAL

## 2024-06-02 ENCOUNTER — HEALTH MAINTENANCE LETTER (OUTPATIENT)
Age: 54
End: 2024-06-02

## 2024-07-06 ENCOUNTER — APPOINTMENT (OUTPATIENT)
Dept: GENERAL RADIOLOGY | Facility: CLINIC | Age: 54
End: 2024-07-06
Attending: EMERGENCY MEDICINE
Payer: COMMERCIAL

## 2024-07-06 ENCOUNTER — HOSPITAL ENCOUNTER (EMERGENCY)
Facility: CLINIC | Age: 54
Discharge: HOME | End: 2024-07-06
Attending: EMERGENCY MEDICINE | Admitting: EMERGENCY MEDICINE
Payer: COMMERCIAL

## 2024-07-06 VITALS
WEIGHT: 205 LBS | SYSTOLIC BLOOD PRESSURE: 137 MMHG | BODY MASS INDEX: 31.17 KG/M2 | HEART RATE: 61 BPM | RESPIRATION RATE: 14 BRPM | TEMPERATURE: 98.3 F | OXYGEN SATURATION: 98 % | DIASTOLIC BLOOD PRESSURE: 90 MMHG

## 2024-07-06 DIAGNOSIS — Z72.0 TOBACCO USE: ICD-10-CM

## 2024-07-06 DIAGNOSIS — R07.89 CHEST PAIN, NON-CARDIAC: ICD-10-CM

## 2024-07-06 LAB
ALBUMIN SERPL BCG-MCNC: 4.2 G/DL (ref 3.5–5.2)
ALP SERPL-CCNC: 64 U/L (ref 40–150)
ALT SERPL W P-5'-P-CCNC: 23 U/L (ref 0–70)
ANION GAP SERPL CALCULATED.3IONS-SCNC: 10 MMOL/L (ref 7–15)
AST SERPL W P-5'-P-CCNC: 14 U/L (ref 0–45)
ATRIAL RATE - MUSE: 91 BPM
BASOPHILS # BLD AUTO: 0.1 10E3/UL (ref 0–0.2)
BASOPHILS NFR BLD AUTO: 1 %
BILIRUB SERPL-MCNC: 0.2 MG/DL
BUN SERPL-MCNC: 15.3 MG/DL (ref 6–20)
CALCIUM SERPL-MCNC: 9.2 MG/DL (ref 8.6–10)
CHLORIDE SERPL-SCNC: 104 MMOL/L (ref 98–107)
CREAT SERPL-MCNC: 0.9 MG/DL (ref 0.67–1.17)
DEPRECATED HCO3 PLAS-SCNC: 23 MMOL/L (ref 22–29)
DIASTOLIC BLOOD PRESSURE - MUSE: NORMAL MMHG
EGFRCR SERPLBLD CKD-EPI 2021: >90 ML/MIN/1.73M2
EOSINOPHIL # BLD AUTO: 0.3 10E3/UL (ref 0–0.7)
EOSINOPHIL NFR BLD AUTO: 3 %
ERYTHROCYTE [DISTWIDTH] IN BLOOD BY AUTOMATED COUNT: 13.9 % (ref 10–15)
GLUCOSE SERPL-MCNC: 104 MG/DL (ref 70–99)
HCT VFR BLD AUTO: 45.5 % (ref 40–53)
HGB BLD-MCNC: 15.1 G/DL (ref 13.3–17.7)
HOLD SPECIMEN: NORMAL
HOLD SPECIMEN: NORMAL
IMM GRANULOCYTES # BLD: 0.1 10E3/UL
IMM GRANULOCYTES NFR BLD: 1 %
INTERPRETATION ECG - MUSE: NORMAL
LYMPHOCYTES # BLD AUTO: 3.6 10E3/UL (ref 0.8–5.3)
LYMPHOCYTES NFR BLD AUTO: 35 %
MCH RBC QN AUTO: 30.6 PG (ref 26.5–33)
MCHC RBC AUTO-ENTMCNC: 33.2 G/DL (ref 31.5–36.5)
MCV RBC AUTO: 92 FL (ref 78–100)
MONOCYTES # BLD AUTO: 0.7 10E3/UL (ref 0–1.3)
MONOCYTES NFR BLD AUTO: 7 %
NEUTROPHILS # BLD AUTO: 5.7 10E3/UL (ref 1.6–8.3)
NEUTROPHILS NFR BLD AUTO: 55 %
NRBC # BLD AUTO: 0 10E3/UL
NRBC BLD AUTO-RTO: 0 /100
P AXIS - MUSE: 46 DEGREES
PLATELET # BLD AUTO: 272 10E3/UL (ref 150–450)
POTASSIUM SERPL-SCNC: 4.2 MMOL/L (ref 3.4–5.3)
PR INTERVAL - MUSE: 128 MS
PROT SERPL-MCNC: 6.6 G/DL (ref 6.4–8.3)
QRS DURATION - MUSE: 86 MS
QT - MUSE: 352 MS
QTC - MUSE: 432 MS
R AXIS - MUSE: 41 DEGREES
RBC # BLD AUTO: 4.94 10E6/UL (ref 4.4–5.9)
SODIUM SERPL-SCNC: 137 MMOL/L (ref 135–145)
SYSTOLIC BLOOD PRESSURE - MUSE: NORMAL MMHG
T AXIS - MUSE: 16 DEGREES
TROPONIN T SERPL HS-MCNC: 6 NG/L
TROPONIN T SERPL HS-MCNC: <6 NG/L
VENTRICULAR RATE- MUSE: 91 BPM
WBC # BLD AUTO: 10.3 10E3/UL (ref 4–11)

## 2024-07-06 PROCEDURE — 93005 ELECTROCARDIOGRAM TRACING: CPT

## 2024-07-06 PROCEDURE — 36415 COLL VENOUS BLD VENIPUNCTURE: CPT | Performed by: EMERGENCY MEDICINE

## 2024-07-06 PROCEDURE — 85025 COMPLETE CBC W/AUTO DIFF WBC: CPT | Performed by: EMERGENCY MEDICINE

## 2024-07-06 PROCEDURE — 80053 COMPREHEN METABOLIC PANEL: CPT | Performed by: EMERGENCY MEDICINE

## 2024-07-06 PROCEDURE — 84484 ASSAY OF TROPONIN QUANT: CPT | Performed by: EMERGENCY MEDICINE

## 2024-07-06 PROCEDURE — 71046 X-RAY EXAM CHEST 2 VIEWS: CPT

## 2024-07-06 PROCEDURE — 99285 EMERGENCY DEPT VISIT HI MDM: CPT | Mod: 25

## 2024-07-06 ASSESSMENT — COLUMBIA-SUICIDE SEVERITY RATING SCALE - C-SSRS
6. HAVE YOU EVER DONE ANYTHING, STARTED TO DO ANYTHING, OR PREPARED TO DO ANYTHING TO END YOUR LIFE?: NO
2. HAVE YOU ACTUALLY HAD ANY THOUGHTS OF KILLING YOURSELF IN THE PAST MONTH?: NO
1. IN THE PAST MONTH, HAVE YOU WISHED YOU WERE DEAD OR WISHED YOU COULD GO TO SLEEP AND NOT WAKE UP?: NO

## 2024-07-06 ASSESSMENT — ACTIVITIES OF DAILY LIVING (ADL)
ADLS_ACUITY_SCORE: 37
ADLS_ACUITY_SCORE: 35
ADLS_ACUITY_SCORE: 37
ADLS_ACUITY_SCORE: 37

## 2024-07-06 NOTE — ED PROVIDER NOTES
Emergency Department Note      History of Present Illness     Chief Complaint   Chest Pain      HPI   Dustin Avila is a 53 year old male history of CAD status post stents, continued tobacco use, presenting today with chest pain.  Symptoms started 3 days ago.  The patient states that while he was smoking, he developed substernal nonradiating chest discomfort.  Symptoms have been waxing and waning over the last 3 days.  He did take some nitroglycerin while waiting in the lobby, but is unsure if that helped his symptoms.  He recalls having a negative stress test several years ago.  He denies any leg swelling, shortness of breath, sweating, nausea, or lightheadedness.  Dr. Fang is his cardiologist.    Independent Historian   None    Review of External Notes   Cardiac angiogram 11/19/19:    RPAV lesion is 100% stenosed.  Prox RCA lesion is 30% stenosed.  Mid LAD-2 lesion is 30% stenosed.        Single-vessel occlusive coronary artery disease namely  of right posterior lateral branch with left-to-right collateral filling  Patent previously placed circumflex, ramus intermediate and LAD stents  No significant change compared to the 2018 angiogram       Past Medical History     Medical History and Problem List   Past Medical History:   Diagnosis Date    Achilles tendonitis     Ankle edema     Arthritis     Coronary artery disease     Hernia, abdominal     Hyperlipidemia     Incidental lung nodule     Osgood-Schlatter/osteochondroses     WPW (Joshua-Parkinson-White syndrome)        Medications   aspirin EC 81 MG EC tablet  medical cannabis (Patient's own supply)  metoprolol succinate ER (TOPROL XL) 25 MG 24 hr tablet  nitroGLYcerin (NITROSTAT) 0.4 MG sublingual tablet  rosuvastatin (CRESTOR) 40 MG tablet        Surgical History   Past Surgical History:   Procedure Laterality Date    ARTHROSCOPY KNEE  5/8/2012    Procedure:ARTHROSCOPY KNEE; left knee arthroscopy, partial medial and lateral meniscectomy ;  Surgeon:MARIA M THOMAS; Location: OR    COLONOSCOPY N/A 4/20/2022    Procedure: COLONOSCOPY, FLEXIBLE, WITH LESION REMOVAL USING SNARE;  Surgeon: Radha Ortiz MD;  Location:  GI    CV HEART CATHETERIZATION WITH POSSIBLE INTERVENTION N/A 11/19/2019    Procedure: Heart Catheterization with Possible Intervention;  Surgeon: Heydi Bro MD;  Location:  HEART CARDIAC CATH LAB    HERNIA REPAIR      ORTHOPEDIC SURGERY      left leg achilles rupture    ORTHOPEDIC SURGERY      right hip replacemnent    PENIS SURGERY      SEPTOPLASTY      VASECTOMY         Physical Exam     Patient Vitals for the past 24 hrs:   BP Temp Temp src Pulse Resp SpO2 Weight   07/06/24 1557 -- -- -- -- -- 94 % --   07/06/24 1556 105/79 -- -- 67 -- -- --   07/06/24 1428 113/74 98.3  F (36.8  C) Temporal 95 16 94 % 93 kg (205 lb)     Physical Exam  General: alert, lying comfortably on gurney  HENT: mucous membranes moist  CV: regular rate, regular rhythm, 2+ radial pulses.  Resp: normal effort, clear throughout, no crackles or wheezing  GI: abdomen soft and nontender, no guarding  MSK: no bony tenderness  Skin: appropriately warm and dry  Extremities: no edema, calves non-tender  Neuro: alert, clear speech, oriented  Psych: normal mood and affect      Diagnostics     Lab Results   Labs Ordered and Resulted from Time of ED Arrival to Time of ED Departure   COMPREHENSIVE METABOLIC PANEL - Abnormal       Result Value    Sodium 137      Potassium 4.2      Carbon Dioxide (CO2) 23      Anion Gap 10      Urea Nitrogen 15.3      Creatinine 0.90      GFR Estimate >90      Calcium 9.2      Chloride 104      Glucose 104 (*)     Alkaline Phosphatase 64      AST 14      ALT 23      Protein Total 6.6      Albumin 4.2      Bilirubin Total 0.2     TROPONIN T, HIGH SENSITIVITY - Normal    Troponin T, High Sensitivity <6     CBC WITH PLATELETS AND DIFFERENTIAL    WBC Count 10.3      RBC Count 4.94      Hemoglobin 15.1      Hematocrit 45.5       MCV 92      MCH 30.6      MCHC 33.2      RDW 13.9      Platelet Count 272      % Neutrophils 55      % Lymphocytes 35      % Monocytes 7      % Eosinophils 3      % Basophils 1      % Immature Granulocytes 1      NRBCs per 100 WBC 0      Absolute Neutrophils 5.7      Absolute Lymphocytes 3.6      Absolute Monocytes 0.7      Absolute Eosinophils 0.3      Absolute Basophils 0.1      Absolute Immature Granulocytes 0.1      Absolute NRBCs 0.0         Imaging   Chest XR,  PA & LAT   Final Result   IMPRESSION: Heart size and pulmonary vascularity normal. The lungs are clear.          EKG   ECG results from 03/21/22   EKG 12-lead, tracing only     Value    Systolic Blood Pressure     Diastolic Blood Pressure     Ventricular Rate 68    Atrial Rate 68    KS Interval 116    QRS Duration 88        QTc 397    P Axis 54    R AXIS 55    T Axis 30    Interpretation ECG      Sinus rhythm  Normal ECG  When compared with ECG of 19-NOV-2019 08:48,  No significant change was found  Confirmed by GENERATED REPORT, COMPUTER ([id number]),  LIGIA MICHELLE ([id number]) on 3/21/2022 10:35:50 AM           Independent Interpretation   CXR: No pneumothorax, infiltrate, pleural effusion, cardiomegaly, or mediastinal widening.    ED Course      Medications Administered   Medications - No data to display    Procedures   Procedures     Discussion of Management   None    ED Course        Optional/Additional Documentation  None    Medical Decision Making / Diagnosis     CMS Diagnoses: None    MIPS       None    MDM   Dustin Avila is a 53 year old male history of coronary artery disease, presenting today with chest pain.  Symptoms were nonexertional.  EKG without signs of ischemia.  Troponin negative x 2.  Very low suspicion at this point for ACS, given x 2 negative troponins after 3 days of chest pain.  I do not suspect pulmonary embolism, given normal heart rate, no pleuritic pain, no symptoms to suggest DVT, no high risk history including recent  surgery or travel.  Otherwise, labs are unremarkable.  Chest x-ray negative for mediastinal widening, and the patient has 2+ radial pulses making aortic dissection unlikely.  At this point, no emergent or dangerous cause of chest pain is suspected.  He will follow-up with Dr. Kinney, his cardiologist, as well as primary care doctor.  Return to the ED for recurrent pain, new symptoms such as shortness of breath, syncope, or for other concerns.    Disposition   The patient was discharged.     Diagnosis     ICD-10-CM    1. Chest pain, non-cardiac  R07.89       2. Tobacco use  Z72.0            Discharge Medications   New Prescriptions    No medications on file         MD Tonia Rincon, Cyndy Mckenna MD  07/08/24 0578

## 2024-07-06 NOTE — ED TRIAGE NOTES
Chest pain for 3 -4 days, took nitroglycerin today and helped the pain. Pt has a heart history and comes in for evaluation. Denies shortness of breath.      Triage Assessment (Adult)       Row Name 07/06/24 1426          Triage Assessment    Airway WDL WDL        Respiratory WDL    Respiratory WDL WDL        Skin Circulation/Temperature WDL    Skin Circulation/Temperature WDL WDL        Cardiac WDL    Cardiac WDL X;chest pain        Chest Pain Assessment    Chest Pain Location substernal        Peripheral/Neurovascular WDL    Peripheral Neurovascular WDL WDL        Cognitive/Neuro/Behavioral WDL    Cognitive/Neuro/Behavioral WDL WDL

## 2024-07-09 ENCOUNTER — TELEPHONE (OUTPATIENT)
Dept: CARDIOLOGY | Facility: CLINIC | Age: 54
End: 2024-07-09
Payer: COMMERCIAL

## 2024-07-09 NOTE — TELEPHONE ENCOUNTER
[facility] Call Center    Phone Message    May a detailed message be left on voicemail: yes     Reason for Call: Other: Dustin was seen in [facility] ED on Saturday, 7/6 with chest pains, but he was cleared of anything to be concerned about upon discharge.  He has an upcoming appointment with Dr. Kinney and would like to know if he can be seen sooner, as there is no availability sooner that I'm able to schedule.  Please call back to further discuss.     Action Taken: Other: Cardiology    Travel Screening: Not Applicable    Thank you!  [facility]

## 2024-07-11 NOTE — TELEPHONE ENCOUNTER
Informed pt there was no sooner availability to be seen by Dr. Kinney. Pt gave verbal understanding.

## 2024-09-03 ENCOUNTER — OFFICE VISIT (OUTPATIENT)
Dept: FAMILY MEDICINE | Facility: CLINIC | Age: 54
End: 2024-09-03
Payer: COMMERCIAL

## 2024-09-03 VITALS
DIASTOLIC BLOOD PRESSURE: 80 MMHG | OXYGEN SATURATION: 96 % | BODY MASS INDEX: 32.13 KG/M2 | WEIGHT: 212 LBS | RESPIRATION RATE: 14 BRPM | HEIGHT: 68 IN | TEMPERATURE: 97.7 F | HEART RATE: 87 BPM | SYSTOLIC BLOOD PRESSURE: 114 MMHG

## 2024-09-03 DIAGNOSIS — E78.5 HYPERLIPIDEMIA LDL GOAL <100: ICD-10-CM

## 2024-09-03 DIAGNOSIS — I25.10 CORONARY ARTERY DISEASE INVOLVING NATIVE CORONARY ARTERY OF NATIVE HEART WITHOUT ANGINA PECTORIS: Chronic | ICD-10-CM

## 2024-09-03 DIAGNOSIS — Z00.00 ENCOUNTER FOR ANNUAL PHYSICAL EXAM: ICD-10-CM

## 2024-09-03 DIAGNOSIS — Z11.4 SCREENING FOR HIV (HUMAN IMMUNODEFICIENCY VIRUS): Primary | ICD-10-CM

## 2024-09-03 DIAGNOSIS — Z12.5 SCREENING FOR PROSTATE CANCER: ICD-10-CM

## 2024-09-03 DIAGNOSIS — F41.1 GAD (GENERALIZED ANXIETY DISORDER): ICD-10-CM

## 2024-09-03 DIAGNOSIS — Z11.59 NEED FOR HEPATITIS C SCREENING TEST: ICD-10-CM

## 2024-09-03 LAB
ALBUMIN SERPL BCG-MCNC: 4.3 G/DL (ref 3.5–5.2)
ALP SERPL-CCNC: 64 U/L (ref 40–150)
ALT SERPL W P-5'-P-CCNC: 24 U/L (ref 0–70)
ANION GAP SERPL CALCULATED.3IONS-SCNC: 12 MMOL/L (ref 7–15)
AST SERPL W P-5'-P-CCNC: 22 U/L (ref 0–45)
BILIRUB SERPL-MCNC: 0.3 MG/DL
BUN SERPL-MCNC: 14.1 MG/DL (ref 6–20)
CALCIUM SERPL-MCNC: 9.5 MG/DL (ref 8.8–10.4)
CHLORIDE SERPL-SCNC: 105 MMOL/L (ref 98–107)
CREAT SERPL-MCNC: 0.95 MG/DL (ref 0.67–1.17)
EGFRCR SERPLBLD CKD-EPI 2021: >90 ML/MIN/1.73M2
GLUCOSE SERPL-MCNC: 95 MG/DL (ref 70–99)
HCO3 SERPL-SCNC: 23 MMOL/L (ref 22–29)
HCV AB SERPL QL IA: NONREACTIVE
HIV 1+2 AB+HIV1 P24 AG SERPL QL IA: NONREACTIVE
POTASSIUM SERPL-SCNC: 4.5 MMOL/L (ref 3.4–5.3)
PROT SERPL-MCNC: 7 G/DL (ref 6.4–8.3)
PSA SERPL DL<=0.01 NG/ML-MCNC: 2.71 NG/ML (ref 0–3.5)
SODIUM SERPL-SCNC: 140 MMOL/L (ref 135–145)

## 2024-09-03 PROCEDURE — 80053 COMPREHEN METABOLIC PANEL: CPT | Performed by: FAMILY MEDICINE

## 2024-09-03 PROCEDURE — 36415 COLL VENOUS BLD VENIPUNCTURE: CPT | Performed by: FAMILY MEDICINE

## 2024-09-03 PROCEDURE — 87389 HIV-1 AG W/HIV-1&-2 AB AG IA: CPT | Performed by: FAMILY MEDICINE

## 2024-09-03 PROCEDURE — 86803 HEPATITIS C AB TEST: CPT | Performed by: FAMILY MEDICINE

## 2024-09-03 PROCEDURE — 99396 PREV VISIT EST AGE 40-64: CPT | Performed by: FAMILY MEDICINE

## 2024-09-03 PROCEDURE — G0103 PSA SCREENING: HCPCS | Performed by: FAMILY MEDICINE

## 2024-09-03 ASSESSMENT — PAIN SCALES - GENERAL: PAINLEVEL: NO PAIN (0)

## 2024-09-03 NOTE — PROGRESS NOTES
"Preventive Care Visit  [facility] ROB[place] Husain MD, Family Medicine  Sep 3, 2024      Assessment & Plan     Encounter for annual physical exam  Due for some vaccine.he will follow up on that.  - HIV Antigen Antibody Combo; Future  - Hepatitis C Screen Reflex to HCV RNA Quant and Genotype; Future  - Prostate Specific Antigen Screen; Future  - Comprehensive metabolic panel; Future  - HIV Antigen Antibody Combo  - Hepatitis C Screen Reflex to HCV RNA Quant and Genotype  - Prostate Specific Antigen Screen  - Comprehensive metabolic panel    Screening for HIV (human immunodeficiency virus)    - HIV Antigen Antibody Combo; Future  - HIV Antigen Antibody Combo    Need for hepatitis C screening test    - Hepatitis C Screen Reflex to HCV RNA Quant and Genotype; Future  - Hepatitis C Screen Reflex to HCV RNA Quant and Genotype    Coronary artery disease involving native coronary artery of native heart without angina pectoris  Stable status post stent.  He denies any chest pain recently evaluated all labs are normal.    JOHN (generalized anxiety disorder)  Well controled.    Hyperlipidemia LDL goal <100    - Comprehensive metabolic panel; Future  - Comprehensive metabolic panel    Screening for prostate cancer    - Prostate Specific Antigen Screen; Future  - Prostate Specific Antigen Screen            Nicotine/Tobacco Cessation  He reports that he has been smoking cigarettes. He started smoking about 38 years ago. He has a 8.8 pack-year smoking history. He has been exposed to tobacco smoke. He has never used smokeless tobacco.  Nicotine/Tobacco Cessation Plan  Information offered: Patient not interested at this time      BMI  Estimated body mass index is 32.66 kg/m  as calculated from the following:    Height as of this encounter: 1.716 m (5' 7.56\").    Weight as of this encounter: 96.2 kg (212 lb).       Counseling  Appropriate preventive services were addressed with this patient via screening, " questionnaire, or discussion as appropriate for fall prevention, nutrition, physical activity, Tobacco-use cessation, social engagement, weight loss and cognition.  Checklist reviewing preventive services available has been given to the patient.  Reviewed patient's diet, addressing concerns and/or questions.   He is at risk for lack of exercise and has been provided with information to increase physical activity for the benefit of his well-being.           Amanda Marie is a 53 year old, presenting for the following:  No chief complaint on file.        9/3/2024    10:45 AM   Additional Questions   Roomed by Shayla MOORE        Health Care Directive  Patient does not have a Health Care Directive or Living Will: Discussed advance care planning with patient; however, patient declined at this time.    HPI  Chronic medical conditions stable.      9/2/2024   General Health   How would you rate your overall physical health? Good   Feel stress (tense, anxious, or unable to sleep) Not at all            9/2/2024   Nutrition   Three or more servings of calcium each day? (!) NO   Diet: Regular (no restrictions)   How many servings of fruit and vegetables per day? (!) 0-1   How many sweetened beverages each day? 0-1            9/2/2024   Exercise   Days per week of moderate/strenous exercise 1 day   Average minutes spent exercising at this level 30 min      (!) EXERCISE CONCERN      9/2/2024   Social Factors   Frequency of gathering with friends or relatives Once a week   Worry food won't last until get money to buy more No   Food not last or not have enough money for food? No   Do you have housing? (Housing is defined as stable permanent housing and does not include staying ouside in a car, in a tent, in an abandoned building, in an overnight shelter, or couch-surfing.) No   Are you worried about losing your housing? No   Lack of transportation? No   Unable to get utilities (heat,electricity)? No   Want help with housing or  utility concern? No      (!) HOUSING CONCERN PRESENT      2024   Fall Risk   Fallen 2 or more times in the past year? No   Trouble with walking or balance? No             2024   Dental   Dentist two times every year? Yes            2024   TB Screening   Were you born outside of the [place]? No            Today's PHQ-2 Score:       2024     7:44 PM   PHQ-2 (  [employer])   Q1: Little interest or pleasure in doing things 0   Q2: Feeling down, depressed or hopeless 0   PHQ-2 Score 0   Q1: Little interest or pleasure in doing things Not at all   Q2: Feeling down, depressed or hopeless Not at all   PHQ-2 Score 0           2024   Substance Use   Alcohol more than 3/day or more than 7/wk No   Do you use any other substances recreationally? (!) CANNABIS PRODUCTS        Social History     Tobacco Use    Smoking status: Former     Current packs/day: 0.00     Average packs/day: 0.3 packs/day for 35.0 years (8.8 ttl pk-yrs)     Types: Cigarettes     Start date:      Quit date: 2019     Years since quittin.8    Smokeless tobacco: Never   Substance Use Topics    Alcohol use: Not Currently     Alcohol/week: 0.0 standard drinks of alcohol     Comment: once weekly    Drug use: Yes     Types: Marijuana           2024   One time HIV Screening   Previous HIV test? No          2024   STI Screening   New sexual partner(s) since last STI/HIV test? No      ASCVD Risk   The ASCVD Risk score (Nerissa MEJIAS, et al., 2019) failed to calculate for the following reasons:    The patient has a prior MI or stroke diagnosis           Reviewed and updated as needed this visit by Provider                    Past Medical History:   Diagnosis Date    Achilles tendonitis     Ankle edema     Arthritis     Coronary artery disease     cardiac cath 2019: no intervention; cath 2016:  + PIERRE to OM1, PIERRE to LAd, PIERRE to ramus    Hernia, abdominal     Hyperlipidemia     Incidental lung nodule      "Osgood-Schlatter/osteochondroses     left    WPW (Joshua-Parkinson-White syndrome)      Past Surgical History:   Procedure Laterality Date    ARTHROSCOPY KNEE  5/8/2012    Procedure:ARTHROSCOPY KNEE; left knee arthroscopy, partial medial and lateral meniscectomy ; Surgeon:MARIA M THOMAS; Location: OR    COLONOSCOPY N/A 4/20/2022    Procedure: COLONOSCOPY, FLEXIBLE, WITH LESION REMOVAL USING SNARE;  Surgeon: Radha Ortiz MD;  Location:  GI    CV HEART CATHETERIZATION WITH POSSIBLE INTERVENTION N/A 11/19/2019    Procedure: Heart Catheterization with Possible Intervention;  Surgeon: Heydi Bro MD;  Location:  HEART CARDIAC CATH LAB    HERNIA REPAIR      ORTHOPEDIC SURGERY      left leg achilles rupture    ORTHOPEDIC SURGERY      right hip replacemnent    PENIS SURGERY      SEPTOPLASTY      VASECTOMY           Review of Systems  CONSTITUTIONAL: NEGATIVE for fever, chills, change in weight  INTEGUMENTARY/SKIN: NEGATIVE for worrisome rashes, moles or lesions  EYES: NEGATIVE for vision changes or irritation  ENT/MOUTH: NEGATIVE for ear, mouth and throat problems  RESP: NEGATIVE for significant cough or SOB  BREAST: NEGATIVE for masses, tenderness or discharge  CV: NEGATIVE for chest pain, palpitations or peripheral edema  GI: NEGATIVE for nausea, abdominal pain, heartburn, or change in bowel habits  : NEGATIVE for frequency, dysuria, or hematuria  MUSCULOSKELETAL: NEGATIVE for significant arthralgias or myalgia  NEURO: NEGATIVE for weakness, dizziness or paresthesias  ENDOCRINE: NEGATIVE for temperature intolerance, skin/hair changes  HEME: NEGATIVE for bleeding problems  PSYCHIATRIC: NEGATIVE for changes in mood or affect     Objective    Exam  There were no vitals taken for this visit.   Estimated body mass index is 31.17 kg/m  as calculated from the following:    Height as of 8/1/23: 1.727 m (5' 8\").    Weight as of 7/6/24: 93 kg (205 lb).    Physical Exam  GENERAL: alert and no " distress  EYES: Eyes grossly normal to inspection, PERRL and conjunctivae and sclerae normal  HENT: ear canals and TM's normal, nose and mouth without ulcers or lesions  NECK: no adenopathy, no asymmetry, masses, or scars  RESP: lungs clear to auscultation - no rales, rhonchi or wheezes  CV: regular rate and rhythm, normal S1 S2, no S3 or S4, no murmur, click or rub, no peripheral edema  ABDOMEN: soft, nontender, no hepatosplenomegaly, no masses and bowel sounds normal  MS: no gross musculoskeletal defects noted, no edema  SKIN: no suspicious lesions or rashes  NEURO: Normal strength and tone, mentation intact and speech normal  PSYCH: mentation appears normal, affect normal/bright        Signed Electronically by: Oscar Husain MD

## 2024-12-18 DIAGNOSIS — I25.10 CORONARY ARTERY DISEASE INVOLVING NATIVE CORONARY ARTERY OF NATIVE HEART WITHOUT ANGINA PECTORIS: ICD-10-CM

## 2024-12-18 RX ORDER — ROSUVASTATIN CALCIUM 40 MG/1
40 TABLET, COATED ORAL DAILY
Qty: 90 TABLET | Refills: 0 | Status: SHIPPED | OUTPATIENT
Start: 2024-12-18

## 2024-12-18 RX ORDER — METOPROLOL SUCCINATE 25 MG/1
25 TABLET, EXTENDED RELEASE ORAL DAILY
Qty: 90 TABLET | Refills: 0 | Status: SHIPPED | OUTPATIENT
Start: 2024-12-18

## 2025-02-10 ENCOUNTER — TRANSFERRED RECORDS (OUTPATIENT)
Dept: HEALTH INFORMATION MANAGEMENT | Facility: CLINIC | Age: 55
End: 2025-02-10
Payer: COMMERCIAL

## 2025-03-19 DIAGNOSIS — I25.10 CORONARY ARTERY DISEASE INVOLVING NATIVE CORONARY ARTERY OF NATIVE HEART WITHOUT ANGINA PECTORIS: ICD-10-CM

## 2025-03-19 RX ORDER — METOPROLOL SUCCINATE 25 MG/1
25 TABLET, EXTENDED RELEASE ORAL DAILY
Qty: 90 TABLET | Refills: 0 | Status: SHIPPED | OUTPATIENT
Start: 2025-03-19

## 2025-03-19 NOTE — LETTER
March 19, 2025       TO: Dustin Avila  [address] [place] [address]       Dear Dustin Avila,    We recently received a call from your pharmacy requesting a refill of your medication(s).    Our records indicate that you are due for follow-up with your Heart Care Provider. We will refill your medications for 3 months which will allow you enough time to be seen.    Please call [phone] to schedule your appointment.    Thank you for allowing [facility] to be a part of your health care team and we look forward to seeing you soon.    Thank you,    [facility]

## 2025-03-19 NOTE — TELEPHONE ENCOUNTER
[facility] Cardiology Refill Guideline reviewed.  Medication does not meet criteria for refill due to patient is overdue for appointment. 90 day supply sent 12/18/24. Patient no-showed last two scheduled Cardiology appointments.  Messaged to providers team for further review.

## 2025-03-19 NOTE — TELEPHONE ENCOUNTER
90 day refill sent by Dr Kinney. [facility] message sent to patient to schedule follow up.

## 2025-05-08 ENCOUNTER — TRANSFERRED RECORDS (OUTPATIENT)
Dept: HEALTH INFORMATION MANAGEMENT | Facility: CLINIC | Age: 55
End: 2025-05-08
Payer: COMMERCIAL

## 2025-05-19 ENCOUNTER — TRANSFERRED RECORDS (OUTPATIENT)
Dept: HEALTH INFORMATION MANAGEMENT | Facility: CLINIC | Age: 55
End: 2025-05-19
Payer: COMMERCIAL

## 2025-06-03 ENCOUNTER — TELEPHONE (OUTPATIENT)
Dept: CARDIOLOGY | Facility: CLINIC | Age: 55
End: 2025-06-03
Payer: COMMERCIAL

## 2025-06-03 DIAGNOSIS — I25.10 CORONARY ARTERY DISEASE INVOLVING NATIVE CORONARY ARTERY OF NATIVE HEART WITHOUT ANGINA PECTORIS: ICD-10-CM

## 2025-06-03 RX ORDER — NITROGLYCERIN 0.4 MG/1
TABLET SUBLINGUAL
Qty: 25 TABLET | Refills: 0 | Status: SHIPPED | OUTPATIENT
Start: 2025-06-03 | End: 2025-06-05

## 2025-06-03 RX ORDER — ROSUVASTATIN CALCIUM 40 MG/1
40 TABLET, COATED ORAL DAILY
Qty: 90 TABLET | Refills: 0 | Status: SHIPPED | OUTPATIENT
Start: 2025-06-03 | End: 2025-06-05

## 2025-06-03 NOTE — TELEPHONE ENCOUNTER
[facility] Call Center    Phone Message    May a detailed message be left on voicemail: yes     Reason for Call: Medication Refill Request    Has the patient contacted the pharmacy for the refill? Yes     Name of medication being requested:   rosuvastatin (CRESTOR) 40 MG tablet [[id number]] (Order [id number]   nitroGLYcerin (NITROSTAT) 0.4 MG sublingual tablet [[id number]] (Order [id number])    Provider who prescribed the medication: Nirmal- but switching to amber 6/5/25    Pharmacy: [facility]Y #[id number] - [place], [place]  [address]     Date medication is needed: pt has been out of rosuvastatin for 3 days       Action Taken: Other: cardiology     Travel Screening: Not Applicable      Thank you!  Special[facility] Access Center        Date of Service:

## 2025-06-05 ENCOUNTER — OFFICE VISIT (OUTPATIENT)
Dept: CARDIOLOGY | Facility: CLINIC | Age: 55
End: 2025-06-05
Payer: COMMERCIAL

## 2025-06-05 VITALS
BODY MASS INDEX: 30.1 KG/M2 | WEIGHT: 198.6 LBS | HEART RATE: 68 BPM | OXYGEN SATURATION: 96 % | SYSTOLIC BLOOD PRESSURE: 115 MMHG | DIASTOLIC BLOOD PRESSURE: 79 MMHG | HEIGHT: 68 IN

## 2025-06-05 DIAGNOSIS — E78.5 DYSLIPIDEMIA: ICD-10-CM

## 2025-06-05 DIAGNOSIS — I25.10 CORONARY ARTERY DISEASE INVOLVING NATIVE CORONARY ARTERY OF NATIVE HEART WITHOUT ANGINA PECTORIS: Primary | ICD-10-CM

## 2025-06-05 RX ORDER — METOPROLOL SUCCINATE 25 MG/1
25 TABLET, EXTENDED RELEASE ORAL DAILY
Qty: 90 TABLET | Refills: 3 | Status: SHIPPED | OUTPATIENT
Start: 2025-06-05

## 2025-06-05 RX ORDER — NITROGLYCERIN 0.4 MG/1
TABLET SUBLINGUAL
Qty: 25 TABLET | Refills: 11 | Status: SHIPPED | OUTPATIENT
Start: 2025-06-05

## 2025-06-05 RX ORDER — ROSUVASTATIN CALCIUM 40 MG/1
40 TABLET, COATED ORAL DAILY
Qty: 90 TABLET | Refills: 3 | Status: SHIPPED | OUTPATIENT
Start: 2025-06-05

## 2025-06-05 NOTE — PATIENT INSTRUCTIONS
Check fasting blood work in 1 month.  Annual follow up.         It was a pleasure seeing you today and thank you for allowing me to be a part of your health care team.  Please be aware that your follow-up visit may be scheduled with one of the Advanced Practice Providers (Nurse Practitioner or Physician Assistant) on our care team. We are a team and work closely together to meet your health care needs.  Should you have any questions regarding your visit or future needs please feel free to reach out to my care team for assistance.        Thank you, Dr. Hayden Macias        **Nursing: [phone]       **Scheduling: [phone]

## 2025-06-05 NOTE — PROGRESS NOTES
CARDIOLOGY CLINIC ESTABLISHED PATIENT NOTE    Patient name: Dustin Avila  Age: 54 year old  Sex: male  YOB: 1970  Primary Care Physician: [facility]      CHIEF COMPLAINT: Follow-up of coronary artery disease; patient is new to me; previously followed with Dr. Kinney.    HPI: Dustin Avila is a 54 year old male with past medical history pertinent for coronary artery disease with NSTEMI in 2016 with drug-eluting stents to LAD/ramus/OM (residual disease consists of  of  RCA-DORI with left-to-right collaterals; EF 50-55%), dyslipidemia, impaired fasting glucose, tobacco use, asymptomatic Joshua-Parkinson-White, hip/knee replacements, and several other noncardiac comorbidities who presents for a cardiology follow-up visit.      He has a history of coronary artery disease with NSTEMI in 2016 with drug-eluting stents to LAD/ramus/OM; known residual disease consisting of  of RCA-DORI with left-to-right collaterals.  Coronary angiogram 2019 demonstrated all stents to be patent with no flow-limiting lesions and  of RCA-DORI.  2022 echocardiogram demonstrated LVEF 50 to 55% without valve disease and the stress nuclear scan demonstrated a small fixed basal inferior defect consistent with prior infarction without ischemia.  His current cardiac medication consist of aspirin 81 mg daily, metoprolol succinate 25 mg daily, and rosuvastatin 40 mg daily.  Lab work in September 2024 pertinent for potassium 4.5 and creatinine of 0.95.  Lipids last checked in April 2025 with LDL of 65.  Unfortunately continues to smoke; about 5 cigarettes a day.  He was wondering if there is a way of stopping his statin.      PAST MEDICAL AND SURGICAL HISTORY:  I have reviewed this patient's past medical and surgical history    Past Medical History:   Diagnosis Date    Achilles tendonitis     Ankle edema     Arthritis     Coronary artery disease     cardiac cath 11/2019: no intervention; cath 2016:  + PIERRE to  OM1, PIERRE to LAd, PIERRE to ramus    Hernia, abdominal     Hyperlipidemia     Incidental lung nodule     Osgood-Schlatter/osteochondroses     left    WPW (Joshua-Parkinson-White syndrome)        Past Surgical History:   Procedure Laterality Date    ARTHROSCOPY KNEE  2012    Procedure:ARTHROSCOPY KNEE; left knee arthroscopy, partial medial and lateral meniscectomy ; Surgeon:MARIA M THOMAS; Location: OR    COLONOSCOPY N/A 2022    Procedure: COLONOSCOPY, FLEXIBLE, WITH LESION REMOVAL USING SNARE;  Surgeon: Radha Ortiz MD;  Location:  GI    CV HEART CATHETERIZATION WITH POSSIBLE INTERVENTION N/A 2019    Procedure: Heart Catheterization with Possible Intervention;  Surgeon: Heydi Bro MD;  Location:  HEART CARDIAC CATH LAB    HERNIA REPAIR      ORTHOPEDIC SURGERY      left leg achilles rupture    ORTHOPEDIC SURGERY      right hip replacemnent    PENIS SURGERY      SEPTOPLASTY      VASECTOMY           FAMILY HISTORY:  I have reviewed this patient's family history.    Family History   Problem Relation Age of Onset    Diabetes Father          SOCIAL HISTORY:  I have reviewed this patient's social history.    Social History     Socioeconomic History    Marital status:      Spouse name: seperated    Number of children: 2    Years of education: None    Highest education level: None   Occupational History    Occupation: self    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   Tobacco Use    Smoking status: Some Days     Current packs/day: 0.00     Average packs/day: 0.3 packs/day for 35.0 years (8.8 ttl pk-yrs)     Types: Cigarettes     Start date: 1986     Last attempt to quit: 2019     Years since quittin.5     Passive exposure: Past    Smokeless tobacco: Never   Vaping Use    Vaping status: Never Used   Substance and Sexual Activity    Alcohol use: Yes     Comment: very rarely    Drug use: Yes     Types: Marijuana     Comment: medical    Sexual activity: Yes   Other Topics Concern     Parent/sibling w/ CABG, MI or angioplasty before 65F 55M? No    Caffeine Concern No    Sleep Concern Yes    Stress Concern Yes    Weight Concern Yes     Comment: weight loss    Exercise Yes     Comment: cardiac rehab 3 days week     Social Drivers of Health     Financial Resource Strain: Low Risk  (9/2/2024)    Financial Resource Strain     Within the past 12 months, have you or your family members you live with been unable to get utilities (heat, electricity) when it was really needed?: No   Food Insecurity: Low Risk  (9/2/2024)    Food Insecurity     Within the past 12 months, did you worry that your food would run out before you got money to buy more?: No     Within the past 12 months, did the food you bought just not last and you didn t have money to get more?: No   Transportation Needs: Low Risk  (9/2/2024)    Transportation Needs     Within the past 12 months, has lack of transportation kept you from medical appointments, getting your medicines, non-medical meetings or appointments, work, or from getting things that you need?: No   Physical Activity: Insufficiently Active (9/2/2024)    Exercise Vital Sign     Days of Exercise per Week: 1 day     Minutes of Exercise per Session: 30 min   Stress: No Stress Concern Present (9/2/2024)    [facility] - Occupational Stress Questionnaire     Feeling of Stress : Not at all   Social Connections: Unknown (9/2/2024)    Social Connection and Isolation Panel [NHANES]     Frequency of Social Gatherings with Friends and Family: Once a week   Interpersonal Safety: Low Risk  (9/3/2024)    Interpersonal Safety     Do you feel physically and emotionally safe where you currently live?: Yes     Within the past 12 months, have you been hit, slapped, kicked or otherwise physically hurt by someone?: No     Within the past 12 months, have you been humiliated or emotionally abused in other ways by your partner or ex-partner?: No   Housing Stability: High  "Risk (9/2/2024)    Housing Stability     Do you have housing? : No     Are you worried about losing your housing?: No         CURRENT MEDICATIONS:  I have reviewed this patient's current medications.    Current Outpatient Medications   Medication Sig Dispense Refill    aspirin EC 81 MG EC tablet Take 1 tablet (81 mg) by mouth daily 90 tablet 3    medical cannabis (Patient's own supply) See Admin Instructions (The purpose of this order is to document that the patient reports taking medical cannabis.  This is not a prescription, and is not used to certify that the patient has a qualifying medical condition.)      metoprolol succinate ER (TOPROL XL) 25 MG 24 hr tablet Take 1 tablet (25 mg) by mouth daily. 90 tablet 0    nitroGLYcerin (NITROSTAT) 0.4 MG sublingual tablet PLACE 1 TAB TO TONGUE EVERY 5 MINS AS NEEDED-CHEST PAIN. CALL 911 AFTER 3 DOSES IF SYMPTOMS PERSIST 25 tablet 0    rosuvastatin (CRESTOR) 40 MG tablet Take 1 tablet (40 mg) by mouth daily. 90 tablet 0         ALLERGIES/SENSITIVITIES:  I have reviewed this patient's allergies.     Allergies   Allergen Reactions    Cyanoacrylate Itching and Rash    Adhesive Tape Rash     dermabond  dermabond           PHYSICAL EXAM: /79 (BP Location: Left arm, Patient Position: Sitting)   Pulse 68   Ht 1.716 m (5' 7.56\")   Wt 90.1 kg (198 lb 9.6 oz)   SpO2 96%   BMI 30.59 kg/m   Body mass index is 30.59 kg/m .    GENERAL APPEARANCE: No acute distress, awake, alert.    HEENT: No scleral icterus or conjuctival hemorrhage, mucous membranes are moist.    NECK: No carotid bruits are present. Normal jugular venous pressure.    CVS: Regular rate and rhythm with normal S1 and S2, no S3 or S4 gallop is present.    LUNG: Clear to auscultation without crackles or wheezes. Respiratory effort is normal.    GASTROINTESTINAL: Abdomen is soft and non-tender with normal bowel sounds.    EXTREMITIES: No clubbing or cyanosis. No edema.    PSYCHIATRIC: Normal " "affect.    NEUROLOGIC: Alert and appropriate. No obvious focal deficits.      LABS AND DIAGNOSTICS:    CBC:  Lab Results   Component Value Date    WBC 10.3 07/06/2024    WBC 8.6 12/04/2020    RBC 4.94 07/06/2024    RBC 4.55 12/04/2020    HGB 15.1 07/06/2024    HGB 14.1 12/04/2020    HCT 45.5 07/06/2024    HCT 42.7 12/04/2020    MCV 92 07/06/2024    MCV 94 12/04/2020    MCH 30.6 07/06/2024    MCH 31.0 12/04/2020    MCHC 33.2 07/06/2024    MCHC 33.0 12/04/2020    RDW 13.9 07/06/2024    RDW 14.4 12/04/2020     07/06/2024     12/04/2020       BMP:  Lab Results   Component Value Date     09/03/2024     12/04/2020    POTASSIUM 4.5 09/03/2024    POTASSIUM 4.3 03/22/2022    POTASSIUM 4.4 12/04/2020    CHLORIDE 105 09/03/2024    CHLORIDE 110 (H) 03/22/2022    CHLORIDE 109 12/04/2020    CO2 23 09/03/2024    CO2 25 03/22/2022    CO2 28 12/04/2020    ANIONGAP 12 09/03/2024    ANIONGAP 5 03/22/2022    ANIONGAP 3 12/04/2020    GLC 95 09/03/2024     (H) 03/22/2022    GLC 91 12/04/2020    BUN 14.1 09/03/2024    BUN 15 03/22/2022    BUN 14 12/04/2020    CR 0.95 09/03/2024    CR 0.88 12/04/2020    GFRESTIMATED >90 09/03/2024    GFRESTIMATED >90 12/04/2020    GFRESTBLACK >90 12/04/2020    GABRIELA 9.5 09/03/2024    GABRIELA 9.8 12/04/2020       LIPIDS:  Lab Results   Component Value Date    CHOL 119 04/11/2024    CHOL 125 09/18/2020    HDL 38 (L) 04/11/2024    HDL 43 09/18/2020    LDL 65 04/11/2024    LDL 66 09/18/2020    TRIG 82 04/11/2024    TRIG 80 09/18/2020       LFT:  Lab Results   Component Value Date    PROTTOTAL 7.0 09/03/2024    PROTTOTAL 7.8 03/09/2020    ALBUMIN 4.3 09/03/2024    ALBUMIN 3.9 03/09/2020    BILITOTAL 0.3 09/03/2024    BILITOTAL 0.5 03/09/2020    ALKPHOS 64 09/03/2024    ALKPHOS 97 03/09/2020    AST 22 09/03/2024    AST 17 03/09/2020    ALT 24 09/03/2024    ALT 33 03/09/2020       TFT:  No results found for: \"TSH\", \"T4\"    HgbA1c:   Lab Results   Component Value Date    A1C 5.2 " 03/21/2022    A1C 4.8 06/05/2016       INR RESULTS:  Lab Results   Component Value Date    INR 0.96 03/09/2020    INR 0.91 02/27/2020       ECG (personally reviewed):  9/6/2024: Normal sinus rhythm.  No evidence of preexcitation.    Echocardiogram 3/21/2022:  1. Left ventricular systolic function is low normal. The visual ejection  fraction is 50-55%.  2. No regional wall motion abnormalities noted.  3. The right ventricle is normal in structure, function and size.  4. No evidence for significant valvular pathology.  5. Mildly dilated aortic root (sinus of valsalva) 3.8 cm.    Stress Test 8/22/2022:    The nuclear stress test is negative for inducible myocardial ischemia or infarction.    There is a small area of a mild degree of infarction in the basal inferior segment(s) of the left ventricle.    Left ventricular function is normal.    The left ventricular ejection fraction at rest is 62%.  The left ventricular ejection fraction at stress is 68%.    A prior study was conducted on 10/31/2019.  This study has no change when compared with the prior study.    Cardiac catheterization 11/19/2019:  RPAV lesion is 100% stenosed.  Prox RCA lesion is 30% stenosed.  Mid LAD-2 lesion is 30% stenosed.        Single-vessel occlusive coronary artery disease namely  of right posterior lateral branch with left-to-right collateral filling  Patent previously placed circumflex, ramus intermediate and LAD stents  No significant change compared to the 2018 angiogram      IMPRESSION AND PLAN: Dustin Avila is a 54 year old male with past medical history pertinent for coronary artery disease with NSTEMI in 2016 with drug-eluting stents to LAD/ramus/OM (residual disease consists of  of  RCA-DORI with left-to-right collaterals; EF 50-55%), dyslipidemia, impaired fasting glucose, tobacco use, asymptomatic Joshua-Parkinson-White, and several other noncardiac comorbidities who presents for a cardiology follow-up visit.      Coronary  artery disease with prior PCI (2016): He has history of aggressive premature coronary artery disease with PCI in 2016 as outlined above.  Currently stable and asymptomatic.  Cardiovascular risk factors pertinent for continued tobacco use.  We spent quite some time discussing the rationale for ongoing treatment.  Continue aspirin, metoprolol succinate 25 mg daily, and rosuvastatin 40 mg daily.    Dyslipidemia: Lipids are well-controlled with LDL of 65 as of 2024; he stopped taking his statin about 1 week ago and is somewhat apprehensive about statin use in general; no side effects however.  We spent a long time discussing pros and cons of statin therapy.  He is agreeable to restarting rosuvastatin 40 mg daily.  Will check fasting lipids in 1 month's time and I will contact him with results; target LDL less than 55.  If LDL greater than 55 then consider adding Zetia (diet of preliminary discussion with him regarding this and he appears agreeable).    Hypertension: Blood pressure is controlled on metoprolol succinate 25 mg daily.    WPW: Chart history of asymptomatic WPW on EKG. No history of arrhythmia.  I reviewed his most recent EKG which does not demonstrate any evidence of preexcitation.    Tobacco use: Counseling regarding tobacco cessation.  Currently smoking on and off.    He will restart his statin.  Check fasting lipids and AST/ALT/CK in 1 month.  I will contact him with results.  If LDL greater than 55 then would likely recommend adding Zetia 10 mg daily.  Cardiology clinic follow-up in 1 year.    Thank you for the opportunity to participate in the care of this patient. Please do not hesitate to contact me with any questions or concerns.        Today's clinic visit entailed:    40 minutes spent by me on the date of the encounter doing chart review, review of test results, interpretation of tests, patient visit, and documentation   Provider  Link to [facility] Help Grid     The level of medical decision making  during this visit was of moderate complexity.    The longitudinal plan of care for the diagnosis(es)/condition(s) as documented were addressed during this visit. Due to the added complexity in care, I will continue to support Debbieshonna in the subsequent management and with ongoing continuity of care.   Hayden Macias MD, FACC, FASE, [facility].

## 2025-06-05 NOTE — LETTER
6/5/2025    [place] [id number] [facility] [address]    RE: Dustin Snydero       Dear Colleague,     I had the pleasure of seeing Dustin Avila in the [facility].  CARDIOLOGY CLINIC ESTABLISHED PATIENT NOTE    Patient name: Dustin Avila  Age: 54 year old  Sex: male  YOB: 1970  Primary Care Physician: [facility]      CHIEF COMPLAINT: Follow-up of coronary artery disease; patient is new to me; previously followed with Dr. Kinney.    HPI: Dustin Avila is a 54 year old male with past medical history pertinent for coronary artery disease with NSTEMI in 2016 with drug-eluting stents to LAD/ramus/OM (residual disease consists of  of  RCA-DORI with left-to-right collaterals; EF 50-55%), dyslipidemia, impaired fasting glucose, tobacco use, asymptomatic Joshua-Parkinson-White, hip/knee replacements, and several other noncardiac comorbidities who presents for a cardiology follow-up visit.      He has a history of coronary artery disease with NSTEMI in 2016 with drug-eluting stents to LAD/ramus/OM; known residual disease consisting of  of RCA-DORI with left-to-right collaterals.  Coronary angiogram 2019 demonstrated all stents to be patent with no flow-limiting lesions and  of RCA-DORI.  2022 echocardiogram demonstrated LVEF 50 to 55% without valve disease and the stress nuclear scan demonstrated a small fixed basal inferior defect consistent with prior infarction without ischemia.  His current cardiac medication consist of aspirin 81 mg daily, metoprolol succinate 25 mg daily, and rosuvastatin 40 mg daily.  Lab work in September 2024 pertinent for potassium 4.5 and creatinine of 0.95.  Lipids last checked in April 2025 with LDL of 65.  Unfortunately continues to smoke; about 5 cigarettes a day.  He was wondering if there is a way of stopping his statin.      PAST MEDICAL AND SURGICAL HISTORY:  I have reviewed this patient's  past medical and surgical history    Past Medical History:   Diagnosis Date     Achilles tendonitis      Ankle edema      Arthritis      Coronary artery disease     cardiac cath 2019: no intervention; cath 2016:  + PIERRE to OM1, PIERRE to LAd, PIERRE to ramus     Hernia, abdominal      Hyperlipidemia      Incidental lung nodule      Osgood-Schlatter/osteochondroses     left     WPW (Joshua-Parkinson-White syndrome)        Past Surgical History:   Procedure Laterality Date     ARTHROSCOPY KNEE  2012    Procedure:ARTHROSCOPY KNEE; left knee arthroscopy, partial medial and lateral meniscectomy ; Surgeon:MARIA M THOMAS; Location: OR     COLONOSCOPY N/A 2022    Procedure: COLONOSCOPY, FLEXIBLE, WITH LESION REMOVAL USING SNARE;  Surgeon: Radha Ortiz MD;  Location:  GI     CV HEART CATHETERIZATION WITH POSSIBLE INTERVENTION N/A 2019    Procedure: Heart Catheterization with Possible Intervention;  Surgeon: Heydi Bro MD;  Location:  HEART CARDIAC CATH LAB     HERNIA REPAIR       ORTHOPEDIC SURGERY      left leg achilles rupture     ORTHOPEDIC SURGERY      right hip replacemnent     PENIS SURGERY       SEPTOPLASTY       VASECTOMY           FAMILY HISTORY:  I have reviewed this patient's family history.    Family History   Problem Relation Age of Onset     Diabetes Father          SOCIAL HISTORY:  I have reviewed this patient's social history.    Social History     Socioeconomic History     Marital status:      Spouse name: seperated     Number of children: 2     Years of education: None     Highest education level: None   Occupational History     Occupation: self     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   Tobacco Use     Smoking status: Some Days     Current packs/day: 0.00     Average packs/day: 0.3 packs/day for 35.0 years (8.8 ttl pk-yrs)     Types: Cigarettes     Start date: 1986     Last attempt to quit: 2019     Years since quittin.5     Passive exposure: Past     Smokeless  tobacco: Never   Vaping Use     Vaping status: Never Used   Substance and Sexual Activity     Alcohol use: Yes     Comment: very rarely     Drug use: Yes     Types: Marijuana     Comment: medical     Sexual activity: Yes   Other Topics Concern     Parent/sibling w/ CABG, MI or angioplasty before 65F 55M? No     Caffeine Concern No     Sleep Concern Yes     Stress Concern Yes     Weight Concern Yes     Comment: weight loss     Exercise Yes     Comment: cardiac rehab 3 days week     Social Drivers of Health     Financial Resource Strain: Low Risk  (9/2/2024)    Financial Resource Strain      Within the past 12 months, have you or your family members you live with been unable to get utilities (heat, electricity) when it was really needed?: No   Food Insecurity: Low Risk  (9/2/2024)    Food Insecurity      Within the past 12 months, did you worry that your food would run out before you got money to buy more?: No      Within the past 12 months, did the food you bought just not last and you didn t have money to get more?: No   Transportation Needs: Low Risk  (9/2/2024)    Transportation Needs      Within the past 12 months, has lack of transportation kept you from medical appointments, getting your medicines, non-medical meetings or appointments, work, or from getting things that you need?: No   Physical Activity: Insufficiently Active (9/2/2024)    Exercise Vital Sign      Days of Exercise per Week: 1 day      Minutes of Exercise per Session: 30 min   Stress: No Stress Concern Present (9/2/2024)    [employer] [employer] - Occupational Stress Questionnaire      Feeling of Stress : Not at all   Social Connections: Unknown (9/2/2024)    Social Connection and Isolation Panel [NHANES]      Frequency of Social Gatherings with Friends and Family: Once a week   Interpersonal Safety: Low Risk  (9/3/2024)    Interpersonal Safety      Do you feel physically and emotionally safe where you currently live?: Yes       "Within the past 12 months, have you been hit, slapped, kicked or otherwise physically hurt by someone?: No      Within the past 12 months, have you been humiliated or emotionally abused in other ways by your partner or ex-partner?: No   Housing Stability: High Risk (9/2/2024)    Housing Stability      Do you have housing? : No      Are you worried about losing your housing?: No         CURRENT MEDICATIONS:  I have reviewed this patient's current medications.    Current Outpatient Medications   Medication Sig Dispense Refill     aspirin EC 81 MG EC tablet Take 1 tablet (81 mg) by mouth daily 90 tablet 3     medical cannabis (Patient's own supply) See Admin Instructions (The purpose of this order is to document that the patient reports taking medical cannabis.  This is not a prescription, and is not used to certify that the patient has a qualifying medical condition.)       metoprolol succinate ER (TOPROL XL) 25 MG 24 hr tablet Take 1 tablet (25 mg) by mouth daily. 90 tablet 0     nitroGLYcerin (NITROSTAT) 0.4 MG sublingual tablet PLACE 1 TAB TO TONGUE EVERY 5 MINS AS NEEDED-CHEST PAIN. CALL 911 AFTER 3 DOSES IF SYMPTOMS PERSIST 25 tablet 0     rosuvastatin (CRESTOR) 40 MG tablet Take 1 tablet (40 mg) by mouth daily. 90 tablet 0         ALLERGIES/SENSITIVITIES:  I have reviewed this patient's allergies.     Allergies   Allergen Reactions     Cyanoacrylate Itching and Rash     Adhesive Tape Rash     dermabond  dermabond           PHYSICAL EXAM: /79 (BP Location: Left arm, Patient Position: Sitting)   Pulse 68   Ht 1.716 m (5' 7.56\")   Wt 90.1 kg (198 lb 9.6 oz)   SpO2 96%   BMI 30.59 kg/m   Body mass index is 30.59 kg/m .    GENERAL APPEARANCE: No acute distress, awake, alert.    HEENT: No scleral icterus or conjuctival hemorrhage, mucous membranes are moist.    NECK: No carotid bruits are present. Normal jugular venous pressure.    CVS: Regular rate and rhythm with normal S1 and S2, no S3 or S4 gallop is " present.    LUNG: Clear to auscultation without crackles or wheezes. Respiratory effort is normal.    GASTROINTESTINAL: Abdomen is soft and non-tender with normal bowel sounds.    EXTREMITIES: No clubbing or cyanosis. No edema.    PSYCHIATRIC: Normal affect.    NEUROLOGIC: Alert and appropriate. No obvious focal deficits.      LABS AND DIAGNOSTICS:    CBC:  Lab Results   Component Value Date    WBC 10.3 07/06/2024    WBC 8.6 12/04/2020    RBC 4.94 07/06/2024    RBC 4.55 12/04/2020    HGB 15.1 07/06/2024    HGB 14.1 12/04/2020    HCT 45.5 07/06/2024    HCT 42.7 12/04/2020    MCV 92 07/06/2024    MCV 94 12/04/2020    MCH 30.6 07/06/2024    MCH 31.0 12/04/2020    MCHC 33.2 07/06/2024    MCHC 33.0 12/04/2020    RDW 13.9 07/06/2024    RDW 14.4 12/04/2020     07/06/2024     12/04/2020       BMP:  Lab Results   Component Value Date     09/03/2024     12/04/2020    POTASSIUM 4.5 09/03/2024    POTASSIUM 4.3 03/22/2022    POTASSIUM 4.4 12/04/2020    CHLORIDE 105 09/03/2024    CHLORIDE 110 (H) 03/22/2022    CHLORIDE 109 12/04/2020    CO2 23 09/03/2024    CO2 25 03/22/2022    CO2 28 12/04/2020    ANIONGAP 12 09/03/2024    ANIONGAP 5 03/22/2022    ANIONGAP 3 12/04/2020    GLC 95 09/03/2024     (H) 03/22/2022    GLC 91 12/04/2020    BUN 14.1 09/03/2024    BUN 15 03/22/2022    BUN 14 12/04/2020    CR 0.95 09/03/2024    CR 0.88 12/04/2020    GFRESTIMATED >90 09/03/2024    GFRESTIMATED >90 12/04/2020    GFRESTBLACK >90 12/04/2020    GABRIELA 9.5 09/03/2024    GABRIELA 9.8 12/04/2020       LIPIDS:  Lab Results   Component Value Date    CHOL 119 04/11/2024    CHOL 125 09/18/2020    HDL 38 (L) 04/11/2024    HDL 43 09/18/2020    LDL 65 04/11/2024    LDL 66 09/18/2020    TRIG 82 04/11/2024    TRIG 80 09/18/2020       LFT:  Lab Results   Component Value Date    PROTTOTAL 7.0 09/03/2024    PROTTOTAL 7.8 03/09/2020    ALBUMIN 4.3 09/03/2024    ALBUMIN 3.9 03/09/2020    BILITOTAL 0.3 09/03/2024    BILITOTAL 0.5  "03/09/2020    ALKPHOS 64 09/03/2024    ALKPHOS 97 03/09/2020    AST 22 09/03/2024    AST 17 03/09/2020    ALT 24 09/03/2024    ALT 33 03/09/2020       TFT:  No results found for: \"TSH\", \"T4\"    HgbA1c:   Lab Results   Component Value Date    A1C 5.2 03/21/2022    A1C 4.8 06/05/2016       INR RESULTS:  Lab Results   Component Value Date    INR 0.96 03/09/2020    INR 0.91 02/27/2020       ECG (personally reviewed):  9/6/2024: Normal sinus rhythm.  No evidence of preexcitation.    Echocardiogram 3/21/2022:  1. Left ventricular systolic function is low normal. The visual ejection  fraction is 50-55%.  2. No regional wall motion abnormalities noted.  3. The right ventricle is normal in structure, function and size.  4. No evidence for significant valvular pathology.  5. Mildly dilated aortic root (sinus of valsalva) 3.8 cm.    Stress Test 8/22/2022:     The nuclear stress test is negative for inducible myocardial ischemia or infarction.     There is a small area of a mild degree of infarction in the basal inferior segment(s) of the left ventricle.     Left ventricular function is normal.     The left ventricular ejection fraction at rest is 62%.  The left ventricular ejection fraction at stress is 68%.     A prior study was conducted on 10/31/2019.  This study has no change when compared with the prior study.    Cardiac catheterization 11/19/2019:  RPAV lesion is 100% stenosed.  Prox RCA lesion is 30% stenosed.  Mid LAD-2 lesion is 30% stenosed.        Single-vessel occlusive coronary artery disease namely  of right posterior lateral branch with left-to-right collateral filling  Patent previously placed circumflex, ramus intermediate and LAD stents  No significant change compared to the 2018 angiogram      IMPRESSION AND PLAN: Dustin Avila is a 54 year old male with past medical history pertinent for coronary artery disease with NSTEMI in 2016 with drug-eluting stents to LAD/ramus/OM (residual disease consists of "  of  RCA-DORI with left-to-right collaterals; EF 50-55%), dyslipidemia, impaired fasting glucose, tobacco use, asymptomatic Joshua-Parkinson-White, and several other noncardiac comorbidities who presents for a cardiology follow-up visit.      Coronary artery disease with prior PCI (2016): He has history of aggressive premature coronary artery disease with PCI in 2016 as outlined above.  Currently stable and asymptomatic.  Cardiovascular risk factors pertinent for continued tobacco use.  We spent quite some time discussing the rationale for ongoing treatment.  Continue aspirin, metoprolol succinate 25 mg daily, and rosuvastatin 40 mg daily.    Dyslipidemia: Lipids are well-controlled with LDL of 65 as of 2024; he stopped taking his statin about 1 week ago and is somewhat apprehensive about statin use in general; no side effects however.  We spent a long time discussing pros and cons of statin therapy.  He is agreeable to restarting rosuvastatin 40 mg daily.  Will check fasting lipids in 1 month's time and I will contact him with results; target LDL less than 55.  If LDL greater than 55 then consider adding Zetia (diet of preliminary discussion with him regarding this and he appears agreeable).    Hypertension: Blood pressure is controlled on metoprolol succinate 25 mg daily.    WPW: Chart history of asymptomatic WPW on EKG. No history of arrhythmia.  I reviewed his most recent EKG which does not demonstrate any evidence of preexcitation.    Tobacco use: Counseling regarding tobacco cessation.  Currently smoking on and off.    He will restart his statin.  Check fasting lipids and AST/ALT/CK in 1 month.  I will contact him with results.  If LDL greater than 55 then would likely recommend adding Zetia 10 mg daily.  Cardiology clinic follow-up in 1 year.    Thank you for the opportunity to participate in the care of this patient. Please do not hesitate to contact me with any questions or concerns.        Today's clinic  visit entailed:    40 minutes spent by me on the date of the encounter doing chart review, review of test results, interpretation of tests, patient visit, and documentation   Provider  Link to MDM Help Grid     The level of medical decision making during this visit was of moderate complexity.    The longitudinal plan of care for the diagnosis(es)/condition(s) as documented were addressed during this visit. Due to the added complexity in care, I will continue to support Cynthia in the subsequent management and with ongoing continuity of care.   Hayden Macias MD, FACC, FASE, [facility].     Thank you for allowing me to participate in the care of your patient.      Sincerely,     Hayden Macias MD     [facility]  cc:   Adalid Kinney MD  No address on file

## 2025-07-07 ENCOUNTER — LAB (OUTPATIENT)
Dept: LAB | Facility: CLINIC | Age: 55
End: 2025-07-07
Payer: COMMERCIAL

## 2025-07-07 DIAGNOSIS — I25.10 CORONARY ARTERY DISEASE INVOLVING NATIVE CORONARY ARTERY OF NATIVE HEART WITHOUT ANGINA PECTORIS: ICD-10-CM

## 2025-07-07 LAB
ALT SERPL W P-5'-P-CCNC: 21 U/L (ref 0–70)
AST SERPL W P-5'-P-CCNC: 20 U/L (ref 0–45)
CHOLEST SERPL-MCNC: 160 MG/DL
CK SERPL-CCNC: 109 U/L (ref 39–308)
FASTING STATUS PATIENT QL REPORTED: NORMAL
HDLC SERPL-MCNC: 44 MG/DL
HOLD SPECIMEN: NORMAL
LDLC SERPL CALC-MCNC: 95 MG/DL
NONHDLC SERPL-MCNC: 116 MG/DL
TRIGL SERPL-MCNC: 103 MG/DL

## 2025-07-07 PROCEDURE — 80061 LIPID PANEL: CPT

## 2025-07-07 PROCEDURE — 84450 TRANSFERASE (AST) (SGOT): CPT

## 2025-07-07 PROCEDURE — 82550 ASSAY OF CK (CPK): CPT

## 2025-07-07 PROCEDURE — 84460 ALANINE AMINO (ALT) (SGPT): CPT

## 2025-07-07 PROCEDURE — 36415 COLL VENOUS BLD VENIPUNCTURE: CPT

## 2025-08-04 ENCOUNTER — PATIENT OUTREACH (OUTPATIENT)
Dept: CARE COORDINATION | Facility: CLINIC | Age: 55
End: 2025-08-04
Payer: COMMERCIAL

## 2025-08-18 ENCOUNTER — PATIENT OUTREACH (OUTPATIENT)
Dept: CARE COORDINATION | Facility: CLINIC | Age: 55
End: 2025-08-18
Payer: COMMERCIAL

## (undated) DEVICE — CATH ANGIO JUDKINS JL4 6FRX100CM INFINITI 534620T

## (undated) DEVICE — SLEEVE TR BAND RADIAL COMPRESSION DEVICE 24CM TRB24-REG

## (undated) DEVICE — TOTE ANGIO CORP PC15AT SAN32CC83O

## (undated) DEVICE — DEFIB PRO-PADZ LVP LQD GEL ADULT 8900-2105-01

## (undated) DEVICE — MANIFOLD KIT ANGIO AUTOMATED 014613

## (undated) DEVICE — INTRO GLIDESHEATH SLENDER 6FR 10X45CM 60-1060

## (undated) DEVICE — WIRE GUIDE 0.035"X260CM SAFE-T-J EXCHANGE G00517

## (undated) DEVICE — NDL PERC ENTRY 21GA 4CM G00280

## (undated) DEVICE — INTRO SHEATH 6FRX10CM PINNACLE RSS602

## (undated) DEVICE — KIT HAND CONTROL ANGIOTOUCH ACIST 65CM AT-P65

## (undated) DEVICE — CATH ANGIO INFINITI 3DRC 6FRX100CM 534676T

## (undated) DEVICE — INTRODUCER CATH VASC 5FRX10CM  MPIS-501-NT-U-SST

## (undated) RX ORDER — FENTANYL CITRATE 50 UG/ML
INJECTION, SOLUTION INTRAMUSCULAR; INTRAVENOUS
Status: DISPENSED
Start: 2019-11-19

## (undated) RX ORDER — VERAPAMIL HYDROCHLORIDE 2.5 MG/ML
INJECTION, SOLUTION INTRAVENOUS
Status: DISPENSED
Start: 2019-11-19

## (undated) RX ORDER — HEPARIN SODIUM 1000 [USP'U]/ML
INJECTION, SOLUTION INTRAVENOUS; SUBCUTANEOUS
Status: DISPENSED
Start: 2019-11-19

## (undated) RX ORDER — FENTANYL CITRATE 50 UG/ML
INJECTION, SOLUTION INTRAMUSCULAR; INTRAVENOUS
Status: DISPENSED
Start: 2022-04-20

## (undated) RX ORDER — HEPARIN SODIUM 200 [USP'U]/100ML
INJECTION, SOLUTION INTRAVENOUS
Status: DISPENSED
Start: 2019-11-19

## (undated) RX ORDER — LIDOCAINE HYDROCHLORIDE 10 MG/ML
INJECTION, SOLUTION EPIDURAL; INFILTRATION; INTRACAUDAL; PERINEURAL
Status: DISPENSED
Start: 2019-11-19

## (undated) RX ORDER — REGADENOSON 0.08 MG/ML
INJECTION, SOLUTION INTRAVENOUS
Status: DISPENSED
Start: 2019-10-31

## (undated) RX ORDER — NITROGLYCERIN 5 MG/ML
VIAL (ML) INTRAVENOUS
Status: DISPENSED
Start: 2019-11-19